# Patient Record
Sex: FEMALE | Race: WHITE | Employment: FULL TIME | URBAN - METROPOLITAN AREA
[De-identification: names, ages, dates, MRNs, and addresses within clinical notes are randomized per-mention and may not be internally consistent; named-entity substitution may affect disease eponyms.]

---

## 2017-06-10 ENCOUNTER — APPOINTMENT (OUTPATIENT)
Dept: ULTRASOUND IMAGING | Age: 37
End: 2017-06-10
Payer: COMMERCIAL

## 2017-06-10 ENCOUNTER — HOSPITAL ENCOUNTER (EMERGENCY)
Age: 37
Discharge: HOME | End: 2017-06-10
Attending: EMERGENCY MEDICINE
Payer: COMMERCIAL

## 2017-06-10 VITALS
SYSTOLIC BLOOD PRESSURE: 132 MMHG | TEMPERATURE: 98.5 F | HEIGHT: 64 IN | WEIGHT: 160 LBS | DIASTOLIC BLOOD PRESSURE: 72 MMHG | HEART RATE: 84 BPM | OXYGEN SATURATION: 99 % | BODY MASS INDEX: 27.31 KG/M2 | RESPIRATION RATE: 16 BRPM

## 2017-06-10 DIAGNOSIS — O36.4XX0 FETAL DEMISE BEFORE 22 WEEKS WITH RETENTION OF DEAD FETUS: Primary | ICD-10-CM

## 2017-06-10 LAB
-: ABNORMAL
ABO/RH: NORMAL
ABSOLUTE EOS #: 0.2 K/UL (ref 0–0.4)
ABSOLUTE LYMPH #: 3.3 K/UL (ref 1–4.8)
ABSOLUTE MONO #: 0.6 K/UL (ref 0.1–1.3)
AMORPHOUS: ABNORMAL
ANTIBODY SCREEN: NEGATIVE
ARM BAND NUMBER: NORMAL
BACTERIA: ABNORMAL
BASOPHILS # BLD: 1 %
BASOPHILS ABSOLUTE: 0.1 K/UL (ref 0–0.2)
BILIRUBIN URINE: NEGATIVE
CASTS UA: ABNORMAL /LPF
COLOR: YELLOW
COMMENT UA: ABNORMAL
CRYSTALS, UA: ABNORMAL /HPF
DIFFERENTIAL TYPE: NORMAL
DIRECT EXAM: NORMAL
EOSINOPHILS RELATIVE PERCENT: 2 %
EPITHELIAL CELLS UA: ABNORMAL /HPF
EXPIRATION DATE: NORMAL
GLUCOSE URINE: NEGATIVE
HCG QUANTITATIVE: 2417 IU/L
HCT VFR BLD CALC: 37.7 % (ref 36–46)
HEMOGLOBIN: 12.6 G/DL (ref 12–16)
KETONES, URINE: NEGATIVE
LEUKOCYTE ESTERASE, URINE: NEGATIVE
LYMPHOCYTES # BLD: 33 %
Lab: NORMAL
MCH RBC QN AUTO: 29 PG (ref 26–34)
MCHC RBC AUTO-ENTMCNC: 33.4 G/DL (ref 31–37)
MCV RBC AUTO: 86.9 FL (ref 80–100)
MONOCYTES # BLD: 6 %
MUCUS: ABNORMAL
NITRITE, URINE: NEGATIVE
OTHER OBSERVATIONS UA: ABNORMAL
PDW BLD-RTO: 13.7 % (ref 11.5–14.9)
PH UA: 7 (ref 5–8)
PLATELET # BLD: 297 K/UL (ref 150–450)
PLATELET ESTIMATE: NORMAL
PMV BLD AUTO: 8.8 FL (ref 6–12)
PROTEIN UA: ABNORMAL
RBC # BLD: 4.33 M/UL (ref 4–5.2)
RBC # BLD: NORMAL 10*6/UL
RBC UA: ABNORMAL /HPF
RENAL EPITHELIAL, UA: ABNORMAL /HPF
SEG NEUTROPHILS: 58 %
SEGMENTED NEUTROPHILS ABSOLUTE COUNT: 5.6 K/UL (ref 1.3–9.1)
SPECIFIC GRAVITY UA: 1.01 (ref 1–1.03)
SPECIMEN DESCRIPTION: NORMAL
STATUS: NORMAL
TRICHOMONAS: ABNORMAL
TURBIDITY: CLEAR
URINE HGB: ABNORMAL
UROBILINOGEN, URINE: NORMAL
WBC # BLD: 9.8 K/UL (ref 3.5–11)
WBC # BLD: NORMAL 10*3/UL
WBC UA: ABNORMAL /HPF
YEAST: ABNORMAL

## 2017-06-10 PROCEDURE — 87480 CANDIDA DNA DIR PROBE: CPT

## 2017-06-10 PROCEDURE — 87491 CHLMYD TRACH DNA AMP PROBE: CPT

## 2017-06-10 PROCEDURE — 87660 TRICHOMONAS VAGIN DIR PROBE: CPT

## 2017-06-10 PROCEDURE — 76817 TRANSVAGINAL US OBSTETRIC: CPT

## 2017-06-10 PROCEDURE — 36415 COLL VENOUS BLD VENIPUNCTURE: CPT

## 2017-06-10 PROCEDURE — 86850 RBC ANTIBODY SCREEN: CPT

## 2017-06-10 PROCEDURE — 99284 EMERGENCY DEPT VISIT MOD MDM: CPT

## 2017-06-10 PROCEDURE — 87510 GARDNER VAG DNA DIR PROBE: CPT

## 2017-06-10 PROCEDURE — 81001 URINALYSIS AUTO W/SCOPE: CPT

## 2017-06-10 PROCEDURE — 85025 COMPLETE CBC W/AUTO DIFF WBC: CPT

## 2017-06-10 PROCEDURE — 87591 N.GONORRHOEAE DNA AMP PROB: CPT

## 2017-06-10 PROCEDURE — 2580000003 HC RX 258: Performed by: PHYSICIAN ASSISTANT

## 2017-06-10 PROCEDURE — 86901 BLOOD TYPING SEROLOGIC RH(D): CPT

## 2017-06-10 PROCEDURE — 84702 CHORIONIC GONADOTROPIN TEST: CPT

## 2017-06-10 PROCEDURE — 86900 BLOOD TYPING SEROLOGIC ABO: CPT

## 2017-06-10 RX ORDER — PNV NO.95/FERROUS FUM/FOLIC AC 28MG-0.8MG
TABLET ORAL DAILY
COMMUNITY
End: 2017-06-11

## 2017-06-10 RX ORDER — 0.9 % SODIUM CHLORIDE 0.9 %
1000 INTRAVENOUS SOLUTION INTRAVENOUS ONCE
Status: COMPLETED | OUTPATIENT
Start: 2017-06-10 | End: 2017-06-10

## 2017-06-10 RX ADMIN — SODIUM CHLORIDE 1000 ML: 9 INJECTION, SOLUTION INTRAVENOUS at 17:30

## 2017-06-10 ASSESSMENT — PAIN DESCRIPTION - DESCRIPTORS: DESCRIPTORS: ACHING

## 2017-06-10 ASSESSMENT — PAIN DESCRIPTION - FREQUENCY: FREQUENCY: CONTINUOUS

## 2017-06-10 ASSESSMENT — ENCOUNTER SYMPTOMS
ABDOMINAL PAIN: 1
NAUSEA: 1
COUGH: 0
SHORTNESS OF BREATH: 0
PHOTOPHOBIA: 0
VOMITING: 0

## 2017-06-10 ASSESSMENT — PAIN DESCRIPTION - ORIENTATION: ORIENTATION: LOWER

## 2017-06-10 ASSESSMENT — PAIN DESCRIPTION - LOCATION: LOCATION: ABDOMEN

## 2017-06-10 ASSESSMENT — PAIN SCALES - GENERAL: PAINLEVEL_OUTOF10: 8

## 2017-06-11 ENCOUNTER — ANESTHESIA (OUTPATIENT)
Dept: OPERATING ROOM | Age: 37
End: 2017-06-11
Payer: COMMERCIAL

## 2017-06-11 ENCOUNTER — ANESTHESIA EVENT (OUTPATIENT)
Dept: OPERATING ROOM | Age: 37
End: 2017-06-11
Payer: COMMERCIAL

## 2017-06-11 ENCOUNTER — HOSPITAL ENCOUNTER (OUTPATIENT)
Age: 37
Setting detail: OUTPATIENT SURGERY
Discharge: HOME | End: 2017-06-11
Attending: EMERGENCY MEDICINE | Admitting: OBSTETRICS & GYNECOLOGY
Payer: COMMERCIAL

## 2017-06-11 VITALS
OXYGEN SATURATION: 96 % | HEIGHT: 64 IN | TEMPERATURE: 97.9 F | DIASTOLIC BLOOD PRESSURE: 78 MMHG | WEIGHT: 160 LBS | BODY MASS INDEX: 27.31 KG/M2 | SYSTOLIC BLOOD PRESSURE: 132 MMHG | HEART RATE: 68 BPM | RESPIRATION RATE: 20 BRPM

## 2017-06-11 VITALS — SYSTOLIC BLOOD PRESSURE: 107 MMHG | OXYGEN SATURATION: 100 % | DIASTOLIC BLOOD PRESSURE: 66 MMHG

## 2017-06-11 DIAGNOSIS — O03.9 MISCARRIAGE: Primary | ICD-10-CM

## 2017-06-11 PROBLEM — O03.4 INCOMPLETE MISCARRIAGE: Status: ACTIVE | Noted: 2017-06-11

## 2017-06-11 PROBLEM — Z98.890 HISTORY OF D&C: Status: ACTIVE | Noted: 2017-06-11

## 2017-06-11 PROBLEM — E03.9 HYPOTHYROIDISM: Status: ACTIVE | Noted: 2017-06-11

## 2017-06-11 PROBLEM — Z98.890 H/O LEEP: Status: ACTIVE | Noted: 2017-06-11

## 2017-06-11 LAB
ABO/RH: NORMAL
ABSOLUTE EOS #: 0.3 K/UL (ref 0–0.4)
ABSOLUTE LYMPH #: 3.4 K/UL (ref 1–4.8)
ABSOLUTE MONO #: 0.6 K/UL (ref 0.1–1.3)
ALBUMIN SERPL-MCNC: 3.9 G/DL (ref 3.5–5.2)
ALBUMIN/GLOBULIN RATIO: ABNORMAL (ref 1–2.5)
ALP BLD-CCNC: 64 U/L (ref 35–104)
ALT SERPL-CCNC: 9 U/L (ref 5–33)
ANION GAP SERPL CALCULATED.3IONS-SCNC: 14 MMOL/L (ref 9–17)
ANTIBODY SCREEN: NEGATIVE
ARM BAND NUMBER: NORMAL
AST SERPL-CCNC: 12 U/L
BASOPHILS # BLD: 1 %
BASOPHILS ABSOLUTE: 0.1 K/UL (ref 0–0.2)
BILIRUB SERPL-MCNC: 0.19 MG/DL (ref 0.3–1.2)
BUN BLDV-MCNC: 6 MG/DL (ref 6–20)
BUN/CREAT BLD: ABNORMAL (ref 9–20)
CALCIUM SERPL-MCNC: 9.4 MG/DL (ref 8.6–10.4)
CHLORIDE BLD-SCNC: 102 MMOL/L (ref 98–107)
CO2: 21 MMOL/L (ref 20–31)
CREAT SERPL-MCNC: 0.69 MG/DL (ref 0.5–0.9)
DIFFERENTIAL TYPE: ABNORMAL
EOSINOPHILS RELATIVE PERCENT: 2 %
EXPIRATION DATE: NORMAL
GFR AFRICAN AMERICAN: >60 ML/MIN
GFR NON-AFRICAN AMERICAN: >60 ML/MIN
GFR SERPL CREATININE-BSD FRML MDRD: ABNORMAL ML/MIN/{1.73_M2}
GFR SERPL CREATININE-BSD FRML MDRD: ABNORMAL ML/MIN/{1.73_M2}
GLUCOSE BLD-MCNC: 105 MG/DL (ref 70–99)
HCG QUANTITATIVE: 1441 IU/L
HCT VFR BLD CALC: 36.8 % (ref 36–46)
HEMOGLOBIN: 12.3 G/DL (ref 12–16)
INR BLD: 0.9
LYMPHOCYTES # BLD: 27 %
MAGNESIUM: 1.9 MG/DL (ref 1.6–2.6)
MCH RBC QN AUTO: 29.3 PG (ref 26–34)
MCHC RBC AUTO-ENTMCNC: 33.5 G/DL (ref 31–37)
MCV RBC AUTO: 87.6 FL (ref 80–100)
MONOCYTES # BLD: 5 %
PDW BLD-RTO: 13.7 % (ref 11.5–14.9)
PLATELET # BLD: 328 K/UL (ref 150–450)
PLATELET ESTIMATE: ABNORMAL
PMV BLD AUTO: 8.7 FL (ref 6–12)
POTASSIUM SERPL-SCNC: 3.4 MMOL/L (ref 3.7–5.3)
PROTHROMBIN TIME: 9.9 SEC (ref 9.7–12)
RBC # BLD: 4.2 M/UL (ref 4–5.2)
RBC # BLD: ABNORMAL 10*6/UL
SEG NEUTROPHILS: 65 %
SEGMENTED NEUTROPHILS ABSOLUTE COUNT: 8.3 K/UL (ref 1.3–9.1)
SODIUM BLD-SCNC: 137 MMOL/L (ref 135–144)
TOTAL PROTEIN: 7.6 G/DL (ref 6.4–8.3)
WBC # BLD: 12.7 K/UL (ref 3.5–11)
WBC # BLD: ABNORMAL 10*3/UL

## 2017-06-11 PROCEDURE — 2580000003 HC RX 258: Performed by: ANESTHESIOLOGY

## 2017-06-11 PROCEDURE — 2500000003 HC RX 250 WO HCPCS: Performed by: ANESTHESIOLOGY

## 2017-06-11 PROCEDURE — 86901 BLOOD TYPING SEROLOGIC RH(D): CPT

## 2017-06-11 PROCEDURE — 6360000002 HC RX W HCPCS: Performed by: EMERGENCY MEDICINE

## 2017-06-11 PROCEDURE — 6360000002 HC RX W HCPCS: Performed by: ANESTHESIOLOGY

## 2017-06-11 PROCEDURE — 3700000000 HC ANESTHESIA ATTENDED CARE: Performed by: OBSTETRICS & GYNECOLOGY

## 2017-06-11 PROCEDURE — 6360000002 HC RX W HCPCS

## 2017-06-11 PROCEDURE — 86900 BLOOD TYPING SEROLOGIC ABO: CPT

## 2017-06-11 PROCEDURE — 85025 COMPLETE CBC W/AUTO DIFF WBC: CPT

## 2017-06-11 PROCEDURE — 59812 TREATMENT OF MISCARRIAGE: CPT | Performed by: OBSTETRICS & GYNECOLOGY

## 2017-06-11 PROCEDURE — 36415 COLL VENOUS BLD VENIPUNCTURE: CPT

## 2017-06-11 PROCEDURE — 80053 COMPREHEN METABOLIC PANEL: CPT

## 2017-06-11 PROCEDURE — 86850 RBC ANTIBODY SCREEN: CPT

## 2017-06-11 PROCEDURE — 3600000012 HC SURGERY LEVEL 2 ADDTL 15MIN: Performed by: OBSTETRICS & GYNECOLOGY

## 2017-06-11 PROCEDURE — 3700000001 HC ADD 15 MINUTES (ANESTHESIA): Performed by: OBSTETRICS & GYNECOLOGY

## 2017-06-11 PROCEDURE — 85610 PROTHROMBIN TIME: CPT

## 2017-06-11 PROCEDURE — 7100000001 HC PACU RECOVERY - ADDTL 15 MIN: Performed by: OBSTETRICS & GYNECOLOGY

## 2017-06-11 PROCEDURE — 7100000000 HC PACU RECOVERY - FIRST 15 MIN: Performed by: OBSTETRICS & GYNECOLOGY

## 2017-06-11 PROCEDURE — 3600000002 HC SURGERY LEVEL 2 BASE: Performed by: OBSTETRICS & GYNECOLOGY

## 2017-06-11 PROCEDURE — 6360000002 HC RX W HCPCS: Performed by: OBSTETRICS & GYNECOLOGY

## 2017-06-11 PROCEDURE — 2580000003 HC RX 258: Performed by: EMERGENCY MEDICINE

## 2017-06-11 PROCEDURE — 99284 EMERGENCY DEPT VISIT MOD MDM: CPT

## 2017-06-11 PROCEDURE — 84702 CHORIONIC GONADOTROPIN TEST: CPT

## 2017-06-11 PROCEDURE — 88305 TISSUE EXAM BY PATHOLOGIST: CPT

## 2017-06-11 PROCEDURE — 83735 ASSAY OF MAGNESIUM: CPT

## 2017-06-11 RX ORDER — SODIUM CHLORIDE 0.9 % (FLUSH) 0.9 %
10 SYRINGE (ML) INJECTION EVERY 12 HOURS SCHEDULED
Status: CANCELLED | OUTPATIENT
Start: 2017-06-11

## 2017-06-11 RX ORDER — HYDROCODONE BITARTRATE AND ACETAMINOPHEN 5; 325 MG/1; MG/1
1 TABLET ORAL EVERY 6 HOURS PRN
Qty: 20 TABLET | Refills: 0 | Status: SHIPPED | OUTPATIENT
Start: 2017-06-11 | End: 2017-06-18

## 2017-06-11 RX ORDER — METOCLOPRAMIDE HYDROCHLORIDE 5 MG/ML
10 INJECTION INTRAMUSCULAR; INTRAVENOUS
Status: DISCONTINUED | OUTPATIENT
Start: 2017-06-11 | End: 2017-06-11 | Stop reason: HOSPADM

## 2017-06-11 RX ORDER — ONDANSETRON 2 MG/ML
4 INJECTION INTRAMUSCULAR; INTRAVENOUS
Status: DISCONTINUED | OUTPATIENT
Start: 2017-06-11 | End: 2017-06-11 | Stop reason: HOSPADM

## 2017-06-11 RX ORDER — FENTANYL CITRATE 50 UG/ML
25 INJECTION, SOLUTION INTRAMUSCULAR; INTRAVENOUS EVERY 5 MIN PRN
Status: DISCONTINUED | OUTPATIENT
Start: 2017-06-11 | End: 2017-06-11 | Stop reason: HOSPADM

## 2017-06-11 RX ORDER — HYDROCODONE BITARTRATE AND ACETAMINOPHEN 5; 325 MG/1; MG/1
2 TABLET ORAL PRN
Status: DISCONTINUED | OUTPATIENT
Start: 2017-06-11 | End: 2017-06-11 | Stop reason: HOSPADM

## 2017-06-11 RX ORDER — HYDROCODONE BITARTRATE AND ACETAMINOPHEN 5; 325 MG/1; MG/1
1 TABLET ORAL PRN
Status: DISCONTINUED | OUTPATIENT
Start: 2017-06-11 | End: 2017-06-11 | Stop reason: HOSPADM

## 2017-06-11 RX ORDER — ROCURONIUM BROMIDE 10 MG/ML
INJECTION, SOLUTION INTRAVENOUS PRN
Status: DISCONTINUED | OUTPATIENT
Start: 2017-06-11 | End: 2017-06-11 | Stop reason: SDUPTHER

## 2017-06-11 RX ORDER — KETOROLAC TROMETHAMINE 30 MG/ML
INJECTION, SOLUTION INTRAMUSCULAR; INTRAVENOUS PRN
Status: DISCONTINUED | OUTPATIENT
Start: 2017-06-11 | End: 2017-06-11 | Stop reason: SDUPTHER

## 2017-06-11 RX ORDER — SODIUM CHLORIDE 9 MG/ML
INJECTION, SOLUTION INTRAVENOUS CONTINUOUS PRN
Status: DISCONTINUED | OUTPATIENT
Start: 2017-06-11 | End: 2017-06-11 | Stop reason: SDUPTHER

## 2017-06-11 RX ORDER — SUCCINYLCHOLINE CHLORIDE 20 MG/ML
INJECTION INTRAMUSCULAR; INTRAVENOUS PRN
Status: DISCONTINUED | OUTPATIENT
Start: 2017-06-11 | End: 2017-06-11 | Stop reason: SDUPTHER

## 2017-06-11 RX ORDER — MORPHINE SULFATE 2 MG/ML
1 INJECTION, SOLUTION INTRAMUSCULAR; INTRAVENOUS EVERY 5 MIN PRN
Status: DISCONTINUED | OUTPATIENT
Start: 2017-06-11 | End: 2017-06-11 | Stop reason: HOSPADM

## 2017-06-11 RX ORDER — MEPERIDINE HYDROCHLORIDE 25 MG/ML
12.5 INJECTION INTRAMUSCULAR; INTRAVENOUS; SUBCUTANEOUS EVERY 5 MIN PRN
Status: DISCONTINUED | OUTPATIENT
Start: 2017-06-11 | End: 2017-06-11 | Stop reason: HOSPADM

## 2017-06-11 RX ORDER — HYDRALAZINE HYDROCHLORIDE 20 MG/ML
5 INJECTION INTRAMUSCULAR; INTRAVENOUS EVERY 10 MIN PRN
Status: DISCONTINUED | OUTPATIENT
Start: 2017-06-11 | End: 2017-06-11 | Stop reason: HOSPADM

## 2017-06-11 RX ORDER — SODIUM CHLORIDE 0.9 % (FLUSH) 0.9 %
10 SYRINGE (ML) INJECTION PRN
Status: CANCELLED | OUTPATIENT
Start: 2017-06-11

## 2017-06-11 RX ORDER — METHYLERGONOVINE MALEATE 0.2 MG/1
0.2 TABLET ORAL EVERY 6 HOURS
Qty: 12 TABLET | Refills: 0 | Status: SHIPPED | OUTPATIENT
Start: 2017-06-11 | End: 2017-06-14

## 2017-06-11 RX ORDER — METRONIDAZOLE 500 MG/1
500 TABLET ORAL 2 TIMES DAILY
Qty: 10 TABLET | Refills: 0 | Status: SHIPPED | OUTPATIENT
Start: 2017-06-11 | End: 2017-06-16

## 2017-06-11 RX ORDER — IBUPROFEN 800 MG/1
800 TABLET ORAL EVERY 8 HOURS PRN
Qty: 30 TABLET | Refills: 0 | Status: ON HOLD | OUTPATIENT
Start: 2017-06-11 | End: 2020-09-08

## 2017-06-11 RX ORDER — LABETALOL HYDROCHLORIDE 5 MG/ML
5 INJECTION, SOLUTION INTRAVENOUS EVERY 10 MIN PRN
Status: DISCONTINUED | OUTPATIENT
Start: 2017-06-11 | End: 2017-06-11 | Stop reason: HOSPADM

## 2017-06-11 RX ORDER — ONDANSETRON 2 MG/ML
INJECTION INTRAMUSCULAR; INTRAVENOUS PRN
Status: DISCONTINUED | OUTPATIENT
Start: 2017-06-11 | End: 2017-06-11 | Stop reason: SDUPTHER

## 2017-06-11 RX ORDER — FENTANYL CITRATE 50 UG/ML
50 INJECTION, SOLUTION INTRAMUSCULAR; INTRAVENOUS EVERY 5 MIN PRN
Status: DISCONTINUED | OUTPATIENT
Start: 2017-06-11 | End: 2017-06-11 | Stop reason: HOSPADM

## 2017-06-11 RX ORDER — PROPOFOL 10 MG/ML
INJECTION, EMULSION INTRAVENOUS PRN
Status: DISCONTINUED | OUTPATIENT
Start: 2017-06-11 | End: 2017-06-11 | Stop reason: SDUPTHER

## 2017-06-11 RX ORDER — 0.9 % SODIUM CHLORIDE 0.9 %
1000 INTRAVENOUS SOLUTION INTRAVENOUS ONCE
Status: COMPLETED | OUTPATIENT
Start: 2017-06-11 | End: 2017-06-11

## 2017-06-11 RX ORDER — FENTANYL CITRATE 50 UG/ML
INJECTION, SOLUTION INTRAMUSCULAR; INTRAVENOUS PRN
Status: DISCONTINUED | OUTPATIENT
Start: 2017-06-11 | End: 2017-06-11 | Stop reason: SDUPTHER

## 2017-06-11 RX ORDER — DIPHENHYDRAMINE HYDROCHLORIDE 50 MG/ML
12.5 INJECTION INTRAMUSCULAR; INTRAVENOUS
Status: DISCONTINUED | OUTPATIENT
Start: 2017-06-11 | End: 2017-06-11 | Stop reason: HOSPADM

## 2017-06-11 RX ADMIN — SODIUM CHLORIDE 1000 ML: 9 INJECTION, SOLUTION INTRAVENOUS at 16:00

## 2017-06-11 RX ADMIN — ONDANSETRON 4 MG: 2 INJECTION, SOLUTION INTRAMUSCULAR; INTRAVENOUS at 18:28

## 2017-06-11 RX ADMIN — PROPOFOL 200 MG: 10 INJECTION, EMULSION INTRAVENOUS at 18:13

## 2017-06-11 RX ADMIN — SODIUM CHLORIDE: 9 INJECTION, SOLUTION INTRAVENOUS at 18:11

## 2017-06-11 RX ADMIN — FENTANYL CITRATE 50 MCG: 50 INJECTION, SOLUTION INTRAMUSCULAR; INTRAVENOUS at 18:13

## 2017-06-11 RX ADMIN — KETOROLAC TROMETHAMINE 30 MG: 30 INJECTION, SOLUTION INTRAMUSCULAR; INTRAVENOUS at 18:34

## 2017-06-11 RX ADMIN — FENTANYL CITRATE 50 MCG: 50 INJECTION, SOLUTION INTRAMUSCULAR; INTRAVENOUS at 18:15

## 2017-06-11 RX ADMIN — ROCURONIUM BROMIDE 10 MG: 10 INJECTION INTRAVENOUS at 18:13

## 2017-06-11 RX ADMIN — HYDROMORPHONE HYDROCHLORIDE 1 MG: 1 INJECTION, SOLUTION INTRAMUSCULAR; INTRAVENOUS; SUBCUTANEOUS at 16:15

## 2017-06-11 RX ADMIN — SUCCINYLCHOLINE CHLORIDE 100 MG: 20 INJECTION, SOLUTION INTRAMUSCULAR; INTRAVENOUS at 18:13

## 2017-06-11 RX ADMIN — Medication 2 G: at 17:52

## 2017-06-11 ASSESSMENT — PAIN SCALES - GENERAL
PAINLEVEL_OUTOF10: 0
PAINLEVEL_OUTOF10: 10
PAINLEVEL_OUTOF10: 10
PAINLEVEL_OUTOF10: 5

## 2017-06-11 ASSESSMENT — PAIN DESCRIPTION - DESCRIPTORS
DESCRIPTORS: CRAMPING;SHARP
DESCRIPTORS: CRAMPING;SHARP;STABBING

## 2017-06-11 ASSESSMENT — PAIN DESCRIPTION - PAIN TYPE
TYPE: ACUTE PAIN
TYPE: ACUTE PAIN

## 2017-06-11 ASSESSMENT — PAIN DESCRIPTION - ORIENTATION
ORIENTATION: LOWER
ORIENTATION: LOWER

## 2017-06-11 ASSESSMENT — PAIN DESCRIPTION - LOCATION
LOCATION: ABDOMEN
LOCATION: ABDOMEN

## 2017-06-12 LAB
C TRACH DNA GENITAL QL NAA+PROBE: NEGATIVE
N. GONORRHOEAE DNA: NEGATIVE

## 2017-06-13 ENCOUNTER — TELEPHONE (OUTPATIENT)
Dept: OBGYN | Age: 37
End: 2017-06-13

## 2017-06-15 LAB — SURGICAL PATHOLOGY REPORT: NORMAL

## 2017-12-02 ENCOUNTER — HOSPITAL ENCOUNTER (OUTPATIENT)
Age: 37
Setting detail: OBSERVATION
LOS: 1 days | Discharge: HOME | End: 2017-12-03
Attending: EMERGENCY MEDICINE | Admitting: INTERNAL MEDICINE
Payer: COMMERCIAL

## 2017-12-02 ENCOUNTER — APPOINTMENT (OUTPATIENT)
Dept: CT IMAGING | Age: 37
End: 2017-12-02
Payer: COMMERCIAL

## 2017-12-02 DIAGNOSIS — E06.9 THYROIDITIS: Primary | ICD-10-CM

## 2017-12-02 DIAGNOSIS — E04.9 GOITER: ICD-10-CM

## 2017-12-02 LAB
ABSOLUTE EOS #: 0.3 K/UL (ref 0–0.4)
ABSOLUTE IMMATURE GRANULOCYTE: ABNORMAL K/UL (ref 0–0.3)
ABSOLUTE LYMPH #: 3.8 K/UL (ref 1–4.8)
ABSOLUTE MONO #: 0.8 K/UL (ref 0.1–1.3)
ANION GAP SERPL CALCULATED.3IONS-SCNC: 9 MMOL/L (ref 9–17)
BASOPHILS # BLD: 1 % (ref 0–2)
BASOPHILS ABSOLUTE: 0.1 K/UL (ref 0–0.2)
BUN BLDV-MCNC: 11 MG/DL (ref 6–20)
BUN/CREAT BLD: ABNORMAL (ref 9–20)
CALCIUM SERPL-MCNC: 9.4 MG/DL (ref 8.6–10.4)
CHLORIDE BLD-SCNC: 104 MMOL/L (ref 98–107)
CO2: 26 MMOL/L (ref 20–31)
CREAT SERPL-MCNC: 0.92 MG/DL (ref 0.5–0.9)
DIFFERENTIAL TYPE: ABNORMAL
EOSINOPHILS RELATIVE PERCENT: 3 % (ref 0–4)
GFR AFRICAN AMERICAN: >60 ML/MIN
GFR NON-AFRICAN AMERICAN: >60 ML/MIN
GFR SERPL CREATININE-BSD FRML MDRD: ABNORMAL ML/MIN/{1.73_M2}
GFR SERPL CREATININE-BSD FRML MDRD: ABNORMAL ML/MIN/{1.73_M2}
GLUCOSE BLD-MCNC: 94 MG/DL (ref 70–99)
HCT VFR BLD CALC: 36.6 % (ref 36–46)
HEMOGLOBIN: 12.2 G/DL (ref 12–16)
IMMATURE GRANULOCYTES: ABNORMAL %
LYMPHOCYTES # BLD: 34 % (ref 24–44)
MCH RBC QN AUTO: 29.4 PG (ref 26–34)
MCHC RBC AUTO-ENTMCNC: 33.3 G/DL (ref 31–37)
MCV RBC AUTO: 88.3 FL (ref 80–100)
MONOCYTES # BLD: 7 % (ref 1–7)
MONONUCLEOSIS SCREEN: NEGATIVE
PDW BLD-RTO: 15.5 % (ref 11.5–14.9)
PLATELET # BLD: 305 K/UL (ref 150–450)
PLATELET ESTIMATE: ABNORMAL
PMV BLD AUTO: 9.6 FL (ref 6–12)
POTASSIUM SERPL-SCNC: 4.4 MMOL/L (ref 3.7–5.3)
RBC # BLD: 4.14 M/UL (ref 4–5.2)
RBC # BLD: ABNORMAL 10*6/UL
SEG NEUTROPHILS: 55 % (ref 36–66)
SEGMENTED NEUTROPHILS ABSOLUTE COUNT: 6.2 K/UL (ref 1.3–9.1)
SODIUM BLD-SCNC: 139 MMOL/L (ref 135–144)
THYROXINE, FREE: 0.7 NG/DL (ref 0.93–1.7)
TSH SERPL DL<=0.05 MIU/L-ACNC: 6.55 MIU/L (ref 0.3–5)
WBC # BLD: 11.2 K/UL (ref 3.5–11)
WBC # BLD: ABNORMAL 10*3/UL

## 2017-12-02 PROCEDURE — 86376 MICROSOMAL ANTIBODY EACH: CPT

## 2017-12-02 PROCEDURE — 85025 COMPLETE CBC W/AUTO DIFF WBC: CPT

## 2017-12-02 PROCEDURE — 99285 EMERGENCY DEPT VISIT HI MDM: CPT

## 2017-12-02 PROCEDURE — 6360000004 HC RX CONTRAST MEDICATION: Performed by: EMERGENCY MEDICINE

## 2017-12-02 PROCEDURE — 2580000003 HC RX 258: Performed by: EMERGENCY MEDICINE

## 2017-12-02 PROCEDURE — G0378 HOSPITAL OBSERVATION PER HR: HCPCS

## 2017-12-02 PROCEDURE — 84443 ASSAY THYROID STIM HORMONE: CPT

## 2017-12-02 PROCEDURE — 80048 BASIC METABOLIC PNL TOTAL CA: CPT

## 2017-12-02 PROCEDURE — 86308 HETEROPHILE ANTIBODY SCREEN: CPT

## 2017-12-02 PROCEDURE — 36415 COLL VENOUS BLD VENIPUNCTURE: CPT

## 2017-12-02 PROCEDURE — 70491 CT SOFT TISSUE NECK W/DYE: CPT

## 2017-12-02 PROCEDURE — 2580000003 HC RX 258: Performed by: INTERNAL MEDICINE

## 2017-12-02 PROCEDURE — 84439 ASSAY OF FREE THYROXINE: CPT

## 2017-12-02 RX ORDER — 0.9 % SODIUM CHLORIDE 0.9 %
100 INTRAVENOUS SOLUTION INTRAVENOUS ONCE
Status: COMPLETED | OUTPATIENT
Start: 2017-12-02 | End: 2017-12-02

## 2017-12-02 RX ORDER — SODIUM CHLORIDE 0.9 % (FLUSH) 0.9 %
10 SYRINGE (ML) INJECTION EVERY 12 HOURS SCHEDULED
Status: DISCONTINUED | OUTPATIENT
Start: 2017-12-02 | End: 2017-12-03 | Stop reason: HOSPADM

## 2017-12-02 RX ORDER — SODIUM CHLORIDE 0.9 % (FLUSH) 0.9 %
10 SYRINGE (ML) INJECTION PRN
Status: DISCONTINUED | OUTPATIENT
Start: 2017-12-02 | End: 2017-12-03 | Stop reason: HOSPADM

## 2017-12-02 RX ORDER — LEVOTHYROXINE SODIUM 150 UG/1
CAPSULE ORAL DAILY
COMMUNITY
End: 2020-01-25

## 2017-12-02 RX ORDER — ACETAMINOPHEN 325 MG/1
650 TABLET ORAL EVERY 4 HOURS PRN
Status: DISCONTINUED | OUTPATIENT
Start: 2017-12-02 | End: 2017-12-03 | Stop reason: HOSPADM

## 2017-12-02 RX ADMIN — SODIUM CHLORIDE 100 ML: 9 INJECTION, SOLUTION INTRAVENOUS at 18:56

## 2017-12-02 RX ADMIN — Medication 10 ML: at 18:56

## 2017-12-02 RX ADMIN — Medication 10 ML: at 10:00

## 2017-12-02 RX ADMIN — IOPAMIDOL 70 ML: 755 INJECTION, SOLUTION INTRAVENOUS at 18:55

## 2017-12-02 ASSESSMENT — ENCOUNTER SYMPTOMS
EYE REDNESS: 0
SHORTNESS OF BREATH: 0
NAUSEA: 0
VOMITING: 0
DIARRHEA: 0
EYE DISCHARGE: 0
SORE THROAT: 0
COLOR CHANGE: 0
RHINORRHEA: 0
COUGH: 0

## 2017-12-02 ASSESSMENT — PAIN DESCRIPTION - PAIN TYPE: TYPE: ACUTE PAIN

## 2017-12-02 ASSESSMENT — PAIN DESCRIPTION - LOCATION: LOCATION: THROAT

## 2017-12-02 ASSESSMENT — PAIN SCALES - GENERAL: PAINLEVEL_OUTOF10: 8

## 2017-12-02 NOTE — ED PROVIDER NOTES
IBUPROFEN (ADVIL;MOTRIN) 800 MG TABLET    Take 1 tablet by mouth every 8 hours as needed for Pain    LEVOTHYROXINE SODIUM 150 MCG CAPS    Take by mouth Daily       ALLERGIES     is allergic to doxycycline. FAMILY HISTORY        none    SOCIAL HISTORY      reports that she has been smoking Cigarettes. She has a 11.50 pack-year smoking history. She has never used smokeless tobacco. She reports that she does not drink alcohol or use drugs. PHYSICAL EXAM     INITIAL VITALS:  height is 5' 4\" (1.626 m) and weight is 153 lb (69.4 kg). Her oral temperature is 98.2 °F (36.8 °C). Her blood pressure is 133/84 and her pulse is 72. Her respiration is 18 and oxygen saturation is 96%. Physical Exam   Constitutional: She is oriented to person, place, and time. She appears well-developed and well-nourished. Non-toxic appearance. She does not appear ill. HENT:   Head: Normocephalic and atraumatic. Mouth/Throat: Oropharynx is clear and moist.   Eyes: Conjunctivae and EOM are normal. Pupils are equal, round, and reactive to light. Neck: Trachea normal and normal range of motion. Neck supple. Thyromegaly present. Cardiovascular: Normal rate, regular rhythm, S1 normal and S2 normal.    No murmur heard. Pulmonary/Chest: Effort normal and breath sounds normal. No accessory muscle usage. No respiratory distress. She exhibits no tenderness and no deformity. Abdominal: Normal appearance and bowel sounds are normal. She exhibits no distension, no abdominal bruit and no ascites. There is no tenderness. There is no rigidity, no rebound and no guarding. Neurological: She is alert and oriented to person, place, and time. GCS eye subscore is 4. GCS verbal subscore is 5. GCS motor subscore is 6. Skin: Skin is warm. No rash noted. Psychiatric: She has a normal mood and affect. Her speech is normal.       DIFFERENTIAL DIAGNOSIS/MDM:   38-year-old female presents with swelling in the thyroid.   Plan is basic labs, CT the case with Dr THOMAS, He states that the patient requires an outpatient workup. I discussed this with the patient, she has no primary care physician and is concerned about losing out on follow-up. I understand these concerns and agree with the patient's thoughts. Plan is to admit to internal medicine for further evaluation. CRITICAL CARE:      CONSULTS:  IP CONSULT TO INTERNAL MEDICINE  IP CONSULT TO OTOLARYNGOLOGY      PROCEDURES:      FINAL IMPRESSION      1. Thyroiditis    2. Goiter          DISPOSITION/PLAN   DISPOSITION Admitted        PATIENT REFERRED TO:  No follow-up provider specified.     DISCHARGE MEDICATIONS:  New Prescriptions    No medications on file       (Please note that portions of this note were completed with a voice recognition program.  Efforts were made to edit the dictations but occasionally words are mis-transcribed.)    Safia Rodrigues  Emergency Medicine                 Safia Rodrigues MD  12/02/17 2100

## 2017-12-02 NOTE — Clinical Note
Patient Class: Inpatient [101]   REQUIRED: Diagnosis: Thyroiditis [668073]   Estimated Length of Stay: Estimated stay of more than 2 midnights   Future Attending Provider: Aldo Mauricio [[id number]]

## 2017-12-03 VITALS
DIASTOLIC BLOOD PRESSURE: 67 MMHG | SYSTOLIC BLOOD PRESSURE: 113 MMHG | OXYGEN SATURATION: 100 % | RESPIRATION RATE: 17 BRPM | WEIGHT: 153 LBS | TEMPERATURE: 98.2 F | HEIGHT: 64 IN | BODY MASS INDEX: 26.12 KG/M2 | HEART RATE: 88 BPM

## 2017-12-03 PROCEDURE — 6370000000 HC RX 637 (ALT 250 FOR IP): Performed by: INTERNAL MEDICINE

## 2017-12-03 PROCEDURE — G0378 HOSPITAL OBSERVATION PER HR: HCPCS

## 2017-12-03 PROCEDURE — 99223 1ST HOSP IP/OBS HIGH 75: CPT | Performed by: INTERNAL MEDICINE

## 2017-12-03 PROCEDURE — 96372 THER/PROPH/DIAG INJ SC/IM: CPT

## 2017-12-03 PROCEDURE — 6360000002 HC RX W HCPCS: Performed by: INTERNAL MEDICINE

## 2017-12-03 PROCEDURE — 2580000003 HC RX 258: Performed by: INTERNAL MEDICINE

## 2017-12-03 PROCEDURE — 31575 DIAGNOSTIC LARYNGOSCOPY: CPT

## 2017-12-03 RX ADMIN — Medication 10 ML: at 09:10

## 2017-12-03 RX ADMIN — ENOXAPARIN SODIUM 40 MG: 40 INJECTION SUBCUTANEOUS at 09:10

## 2017-12-03 RX ADMIN — ACETAMINOPHEN 650 MG: 325 TABLET, FILM COATED ORAL at 14:20

## 2017-12-03 RX ADMIN — ACETAMINOPHEN 650 MG: 325 TABLET, FILM COATED ORAL at 09:15

## 2017-12-03 ASSESSMENT — PAIN SCALES - GENERAL
PAINLEVEL_OUTOF10: 3
PAINLEVEL_OUTOF10: 1
PAINLEVEL_OUTOF10: 8
PAINLEVEL_OUTOF10: 7

## 2017-12-03 ASSESSMENT — PAIN DESCRIPTION - PAIN TYPE: TYPE: ACUTE PAIN

## 2017-12-03 ASSESSMENT — PAIN DESCRIPTION - LOCATION: LOCATION: THROAT

## 2017-12-03 NOTE — CARE COORDINATION
CASE MANAGEMENT NOTE:    Admission Date:  12/2/2017 Js Cash is a 42 y.o.  female    Admitted for : Thyroiditis [E06.9]  Thyroiditis [E06.9]    Met with:  Patient    PCP:  Has none, will need to check with Dr Lonnie Crawley -  If will take                              Insurance:  [facility]     Current Residence/ Living Arrangements:  independently at home- with spouse in 2 story home, can stay on 1 level             Current Services PTA:  No    Is patient agreeable to VNS: No    Freedom of choice provided: Yes         VNS chosen:  No    DME:  none    Home Oxygen: No    Nebulizer: No    Supplier: N/A    Potential Assistance Needed: get help getting PCP    SNF needed: No    Pharmacy:  [facility] on [address]       Does Patient want to use MEDS to BEDS? No    Family Members/Caregivers that pt would like involved in their care:    No    If yes, list name here:      Transportation Provider:  Patient and Family                      Discharge Plan:  Return home, needs help getting PCP, here with thyroiditis, goiter              Readmission Risk              Readmission Risk:        1.25       Age 72 or Greater:  0    Admitted from SNF or Requires Paid or Family Care:  0    Currently has CHF,COPD,ARF,CRI,or is on dialysis:  0    Takes more than 5 Prescription Medications:  0    Takes Digoxin,Insulin,Anticoagulants,Narcotics or ASA/Plavix:  [address] in Past 12 Months:  0    On Disability:  0    Patient Considers own Health:  1.25          Electronically signed by:  Mally Greene RN on 12/3/2017 at 11:34 AM

## 2017-12-03 NOTE — CONSULTS
[address]                   [address] [facility], [address]                                   CONSULTATION    PATIENT NAME: Houston Oneill                :        1980  MED REC NO:   [id number]                              ROOM:       2111  ACCOUNT NO:   [de-identified]                           ADMIT DATE: 2017  PROVIDER:     Christopher Lopez    CONSULT DATE:  2017    HISTORY OF PRESENT ILLNESS:  This is a 38-year-old who was admitted the  evening prior to this dictation with acute worsening thyroid gland  enlargement. The reviewer has referred to the chart for definitive details. The patient admits to an extended history of thyroid disease including  hypothyroidism. She has been evaluated by Endocrinology, Dr. Bob Alford. She  does require daily Synthroid. Unfortunately, she admits to gradual size  increase in her thyroid gland. She also admits to associated neck pain,  dysphagia, and fatigue. CT neck with contrast was obtained one day prior to this dictation. This  was reviewed by myself. This suggested thyroid goiter without dominant  thyroid nodule. There was noted mass affect on the trachea and esophagus. PAST MEDICAL HISTORY:  As above. PAST SURGICAL HISTORY:  LEEP, cholecystectomy, and D and C. ALLERGIES:  DOXYCYCLINE. CURRENT MEDICATIONS:  Include, but are not limited to Lovenox. Blood work, 2017 free T4 low at 0.7. Thyroid peroxidase antibody is  pending. TSH elevated at 6.55. White blood cell count 11.2, hemoglobin  within normal limits, and platelets within normal limits. PHYSICAL EXAMINATION:  VITAL SIGNS:  Afebrile. Heart rate 92, respiratory  rate 17, blood pressure 117/78, and O2 saturation 96% on room air. Weight  153 pounds. GENERAL:  Bedside exam revealed a 42year-old, in moderate  distress owing to symptoms as described above, including described fatigue and pain.   Her  voice is slightly rough. There is no respiratory distress or stridor. HEENT:  Facial exam is unremarkable. Bilateral ear exam was unremarkable. Oral exam revealed end-stage dentition. There was 2+ cryptic tonsil tissue  bilaterally. NECK:  Palpation in the neck revealed diffuse tenderness with  minimal manipulation. The thyroid gland was symmetric, but significantly  hypertrophic. Flexible laryngoscopy through the left nostril at the bedside was grossly  unremarkable. There was no evidence of vocal cord dysfunction. IMPRESSION:  Longstanding worsening thyroid gland enlargement, goiter is  suspected. Unfortunately, the patient has failed extended medical therapy. Definitive therapy will likely require total thyroidectomy. The patient is cleared for discharge from an Otolaryngology standpoint,  which suggests followup in our offices with either Dr. Michael Borrero or Dr. Ramona Bosch for second  opinion and likely surgical scheduling. We would also suggest postop  followup with her endocrinologist, Dr. Scott Velasquez, although inpatient  Endocrinology evaluation could be considered if thyroiditis is suspected. Presently, there is no indication for urgent intervention from an  Otolaryngology standpoint. Additional treatment is supportive. The patient appeared to understand these plans and considerations. All  questions were answered. The above was discussed with the Department of  Nursing as well. Please do not hesitate to contact myself with any  specific questions regarding the above.         Zulma Arizmendi    D: 12/03/2017 13:23:55       T: 12/03/2017 13:57:32     [email or web address]  Job#: [id number]     Doc#: [id number]

## 2017-12-03 NOTE — CARE COORDINATION
ADMISSION NOTE       Patient admitted to room  2111. Time of admit:  2145    Admit from:  ER    Reason for admission:  Thyroiditis    Where patient has been residing for the last 24 hrs:  Private residence    Has the patient been admitted to any facility in the last 4 weeks, which one:  No    Family at bedside:  No    Patient is currently in pain Yes    Patient has been oriented to room, educated on how to use call light, and to call for assistance prior to getting up. Bed in lowest and locked position. 2 siderails up for safety. Call light within reach. [address]  DVT Prophylaxis and Vaccine Status  Work List  Mandatory for all patients      Patient must be on both Chemical prophylaxis and Mechanical prophylaxis.  If chemical/mechanical prophylaxis is not ordered, the physician must document a reason for not using prophylaxis     Chemical Prophylaxis  Is patient on chemical prophylaxis: Yes  If no chemical prophylaxis Is a order in for No Chemical VTE prophylaxisNo  If no was the physician notified not applicable      Mechanical Prophylaxis  Is patient on mechanical prophylaxis, intermittent pneumatic compression device: Yes  If no was the physician notified not applicable        Pneumonia Vaccine  Vaccine indicated:  Not indicated  If indicated was the vaccine given: not applicable    Influenza Vaccine (applicable from October through March):  Vaccine indicated: Up-to-date  If indicated was the vaccine given: not applicable    Patient Education  Education completed on DVT prophylaxis: yes

## 2017-12-03 NOTE — PLAN OF CARE
Problem: Pain:  Goal: Control of acute pain  Control of acute pain   Outcome: Ongoing  Pt did not complain of pain on this shift.

## 2017-12-03 NOTE — H&P
[address] [facility]    HISTORY AND PHYSICAL EXAMINATION            Date:   12/3/2017  Patient name:  Candace Gaytan  Date of admission:  12/2/2017  4:25 PM  MRN:   [id number]  YOB: 1980    CHIEF COMPLAINT:   Neck mass    History Obtained From:  Patient and chart review. HISTORY OF PRESENT ILLNESS:      The patient is a 42 y.o.  female who is admitted to the hospital for  admited with neck mass and pain  And loosoing speech and hoarseness  Some trouble swallowing  No trouble breathing    Past Medical History:   has a past medical history of Thyroid disease. Past Surgical History:   has a past surgical history that includes LEEP; Cholecystectomy; Dilation and curettage of uterus; Dilation and curettage of uterus (06/11/2017); and induced abortn by dil/evac (N/A, 6/11/2017). Home Medications:    Prior to Admission medications    Medication Sig Start Date End Date Taking? Authorizing Provider   Levothyroxine Sodium 150 MCG CAPS Take by mouth Daily   Yes Historical Provider, MD   ibuprofen (ADVIL;MOTRIN) 800 MG tablet Take 1 tablet by mouth every 8 hours as needed for Pain 6/11/17 12/2/17 Yes Liane Martinez DO       Allergies:  Doxycycline    Social History:   reports that she has been smoking Cigarettes. She has a 11.50 pack-year smoking history. She has never used smokeless tobacco. She reports that she does not drink alcohol or use drugs. Family History: family history includes Diabetes in her mother; Mental Illness in her mother; Substance Abuse in her father. REVIEW OF SYSTEMS:    · Constitutional: Negative for weight loss  · Eyes: Negative for visual changes, diplopia, scleral icterus. · ENT: positve for neck mass  ·   · Cardiovascular: Negative for lightheadedness/orthostatic symptoms ,chest pain, dyspnea on exertion, palpitations or loss of consciousness.    · Respiratory: Negative for cough or wheezing, sputum production, hemoptysis, pleuritic pain. · Gastrointestinal: Negative for nausea/vomiting, change in bowel habits, abdominal pain, dysphagia/appetite loss, hematemesis, blood in stools. · Genitourinary:Negative for change in bladder habits, dysuria, trouble voiding, hematuria. · Musculoskeletal:  weakness, joint complaints. · Integumentary: Negative for rash, pruritis. · Neurological: Negative for headache, dizziness, change in muscle strength, numbness/tingling, change in gait, balance, coordination,   · Psychiatric: negative for change in mood, affect, memory, mentation, behavior. · Endocrine: negative for temperature intolerance, excessive thirst, fluid intake, or urination, tremor. · Hematologic/Lymphatic: negative for abnormal bruising or bleeding, blood clots, swollen lymph nodes. · Allergic/Immunologic: negative for nasal congestion, pruritis, hives. PHYSICAL EXAM:    /67   Pulse 88   Temp 98.2 °F (36.8 °C) (Oral)   Resp 17   Ht 5' 4\" (1.626 m)   Wt 153 lb (69.4 kg)   LMP 11/10/2017   SpO2 100%   Breastfeeding? Unknown   BMI 26.26 kg/m²      · General appearance: well nourished  · HEENT: thyromegaly  · Pain ful  ·   · Lungs: clear to auscultation bilaterally  · Heart: regular rate and rhythm, S1, S2 normal, no murmur, click, rub or gallop  · Abdomen: soft, non-tender; bowel sounds normal; no masses,  no organomegaly  · Extremities: extremities normal, atraumatic, no cyanosis or edema  · Neurological: Gait normal. Reflexes normal and symmetric.  Sensation grossly normal  · Skin - no rash, no lump   · Eye no icterus no redness  · Psych-normal affect   · NEURO-no limb weakness  No facial droop  · Lymphatic system-no lymphadenopathy no splenomegaly       DIAGNOSTICS:    Laboratory Testing:  CBC:   Recent Labs      12/02/17   1735   WBC  11.2*   HGB  12.2   PLT  305     BMP:    Recent Labs      12/02/17   1735   NA  139   K  4.4   CL  104   CO2  26   BUN  11   CREATININE

## 2017-12-04 LAB — THYROID PEROXIDASE (TPO) AB: >1000 IU/ML (ref 0–35)

## 2018-05-08 ENCOUNTER — HOSPITAL ENCOUNTER (EMERGENCY)
Age: 38
Discharge: HOME | End: 2018-05-08
Attending: EMERGENCY MEDICINE
Payer: COMMERCIAL

## 2018-05-08 VITALS
HEART RATE: 81 BPM | OXYGEN SATURATION: 99 % | SYSTOLIC BLOOD PRESSURE: 145 MMHG | TEMPERATURE: 98 F | DIASTOLIC BLOOD PRESSURE: 85 MMHG | BODY MASS INDEX: 26.26 KG/M2 | RESPIRATION RATE: 16 BRPM | WEIGHT: 153 LBS

## 2018-05-08 DIAGNOSIS — L30.9 DERMATITIS: Primary | ICD-10-CM

## 2018-05-08 PROCEDURE — 6360000002 HC RX W HCPCS: Performed by: PHYSICIAN ASSISTANT

## 2018-05-08 PROCEDURE — 96372 THER/PROPH/DIAG INJ SC/IM: CPT

## 2018-05-08 PROCEDURE — 99282 EMERGENCY DEPT VISIT SF MDM: CPT

## 2018-05-08 RX ORDER — DEXAMETHASONE SODIUM PHOSPHATE 4 MG/ML
10 INJECTION, SOLUTION INTRA-ARTICULAR; INTRALESIONAL; INTRAMUSCULAR; INTRAVENOUS; SOFT TISSUE ONCE
Status: COMPLETED | OUTPATIENT
Start: 2018-05-08 | End: 2018-05-08

## 2018-05-08 RX ORDER — PREDNISONE 20 MG/1
40 TABLET ORAL DAILY
Qty: 10 TABLET | Refills: 0 | Status: SHIPPED | OUTPATIENT
Start: 2018-05-08 | End: 2018-05-13

## 2018-05-08 RX ORDER — HYDROXYZINE 50 MG/1
50 TABLET, FILM COATED ORAL 3 TIMES DAILY PRN
Qty: 30 TABLET | Refills: 0 | Status: SHIPPED | OUTPATIENT
Start: 2018-05-08 | End: 2018-05-18

## 2018-05-08 RX ADMIN — DEXAMETHASONE SODIUM PHOSPHATE 10 MG: 4 INJECTION, SOLUTION INTRAMUSCULAR; INTRAVENOUS at 22:42

## 2018-10-30 PROBLEM — E04.9 ENLARGED THYROID: Status: ACTIVE | Noted: 2018-10-30

## 2018-10-31 ENCOUNTER — HOSPITAL ENCOUNTER (INPATIENT)
Age: 38
LOS: 1 days | Discharge: LEFT BEFORE DISCHARGE | DRG: 427 | End: 2018-10-31
Attending: FAMILY MEDICINE | Admitting: FAMILY MEDICINE
Payer: COMMERCIAL

## 2018-10-31 ENCOUNTER — APPOINTMENT (OUTPATIENT)
Dept: CT IMAGING | Age: 38
DRG: 427 | End: 2018-10-31
Attending: FAMILY MEDICINE
Payer: COMMERCIAL

## 2018-10-31 VITALS
BODY MASS INDEX: 28.83 KG/M2 | RESPIRATION RATE: 16 BRPM | SYSTOLIC BLOOD PRESSURE: 112 MMHG | DIASTOLIC BLOOD PRESSURE: 63 MMHG | OXYGEN SATURATION: 97 % | HEART RATE: 99 BPM | WEIGHT: 168.87 LBS | HEIGHT: 64 IN | TEMPERATURE: 99.3 F

## 2018-10-31 PROBLEM — R13.19 OTHER DYSPHAGIA: Status: ACTIVE | Noted: 2018-10-31

## 2018-10-31 PROBLEM — E87.6 HYPOKALEMIA: Status: ACTIVE | Noted: 2018-10-31

## 2018-10-31 PROBLEM — N30.00 ACUTE CYSTITIS: Status: ACTIVE | Noted: 2018-10-31

## 2018-10-31 LAB
ANION GAP SERPL CALCULATED.3IONS-SCNC: 14 MMOL/L (ref 9–17)
BUN BLDV-MCNC: 6 MG/DL (ref 6–20)
BUN/CREAT BLD: ABNORMAL (ref 9–20)
CALCIUM SERPL-MCNC: 7.9 MG/DL (ref 8.6–10.4)
CHLORIDE BLD-SCNC: 108 MMOL/L (ref 98–107)
CO2: 20 MMOL/L (ref 20–31)
CREAT SERPL-MCNC: 0.9 MG/DL (ref 0.5–0.9)
GFR AFRICAN AMERICAN: >60 ML/MIN
GFR NON-AFRICAN AMERICAN: >60 ML/MIN
GFR SERPL CREATININE-BSD FRML MDRD: ABNORMAL ML/MIN/{1.73_M2}
GFR SERPL CREATININE-BSD FRML MDRD: ABNORMAL ML/MIN/{1.73_M2}
GLUCOSE BLD-MCNC: 104 MG/DL (ref 70–99)
HCT VFR BLD CALC: 30.8 % (ref 36.3–47.1)
HEMOGLOBIN: 10 G/DL (ref 11.9–15.1)
INR BLD: 1
MAGNESIUM: 1.8 MG/DL (ref 1.6–2.6)
MCH RBC QN AUTO: 28.4 PG (ref 25.2–33.5)
MCHC RBC AUTO-ENTMCNC: 32.5 G/DL (ref 28.4–34.8)
MCV RBC AUTO: 87.5 FL (ref 82.6–102.9)
NRBC AUTOMATED: 0 PER 100 WBC
PDW BLD-RTO: 14.5 % (ref 11.8–14.4)
PLATELET # BLD: 270 K/UL (ref 138–453)
PMV BLD AUTO: 11.4 FL (ref 8.1–13.5)
POTASSIUM SERPL-SCNC: 3.3 MMOL/L (ref 3.7–5.3)
PROTHROMBIN TIME: 11.1 SEC (ref 9–12)
RBC # BLD: 3.52 M/UL (ref 3.95–5.11)
SODIUM BLD-SCNC: 142 MMOL/L (ref 135–144)
TSH SERPL DL<=0.05 MIU/L-ACNC: 4.94 MIU/L (ref 0.3–5)
WBC # BLD: 10.6 K/UL (ref 3.5–11.3)

## 2018-10-31 PROCEDURE — 36415 COLL VENOUS BLD VENIPUNCTURE: CPT

## 2018-10-31 PROCEDURE — G0378 HOSPITAL OBSERVATION PER HR: HCPCS

## 2018-10-31 PROCEDURE — 85610 PROTHROMBIN TIME: CPT

## 2018-10-31 PROCEDURE — 1200000000 HC SEMI PRIVATE

## 2018-10-31 PROCEDURE — 83735 ASSAY OF MAGNESIUM: CPT

## 2018-10-31 PROCEDURE — 6360000002 HC RX W HCPCS: Performed by: NURSE PRACTITIONER

## 2018-10-31 PROCEDURE — 99222 1ST HOSP IP/OBS MODERATE 55: CPT | Performed by: FAMILY MEDICINE

## 2018-10-31 PROCEDURE — 84443 ASSAY THYROID STIM HORMONE: CPT

## 2018-10-31 PROCEDURE — 2580000003 HC RX 258: Performed by: NURSE PRACTITIONER

## 2018-10-31 PROCEDURE — 96372 THER/PROPH/DIAG INJ SC/IM: CPT

## 2018-10-31 PROCEDURE — 80048 BASIC METABOLIC PNL TOTAL CA: CPT

## 2018-10-31 PROCEDURE — 93005 ELECTROCARDIOGRAM TRACING: CPT

## 2018-10-31 PROCEDURE — G0379 DIRECT REFER HOSPITAL OBSERV: HCPCS

## 2018-10-31 PROCEDURE — 85027 COMPLETE CBC AUTOMATED: CPT

## 2018-10-31 RX ORDER — SODIUM CHLORIDE 9 MG/ML
INJECTION, SOLUTION INTRAVENOUS CONTINUOUS
Status: DISCONTINUED | OUTPATIENT
Start: 2018-10-31 | End: 2018-10-31 | Stop reason: HOSPADM

## 2018-10-31 RX ORDER — BISACODYL 10 MG
10 SUPPOSITORY, RECTAL RECTAL DAILY PRN
Status: DISCONTINUED | OUTPATIENT
Start: 2018-10-31 | End: 2018-10-31 | Stop reason: HOSPADM

## 2018-10-31 RX ORDER — POTASSIUM CHLORIDE 20MEQ/15ML
40 LIQUID (ML) ORAL PRN
Status: DISCONTINUED | OUTPATIENT
Start: 2018-10-31 | End: 2018-10-31 | Stop reason: HOSPADM

## 2018-10-31 RX ORDER — CEFTRIAXONE 1 G/1
1 INJECTION, POWDER, FOR SOLUTION INTRAMUSCULAR; INTRAVENOUS EVERY 24 HOURS
Status: DISCONTINUED | OUTPATIENT
Start: 2018-10-31 | End: 2018-10-31

## 2018-10-31 RX ORDER — SODIUM CHLORIDE 0.9 % (FLUSH) 0.9 %
10 SYRINGE (ML) INJECTION EVERY 12 HOURS SCHEDULED
Status: DISCONTINUED | OUTPATIENT
Start: 2018-10-31 | End: 2018-10-31 | Stop reason: HOSPADM

## 2018-10-31 RX ORDER — ACETAMINOPHEN 325 MG/1
650 TABLET ORAL EVERY 4 HOURS PRN
Status: DISCONTINUED | OUTPATIENT
Start: 2018-10-31 | End: 2018-10-31 | Stop reason: HOSPADM

## 2018-10-31 RX ORDER — MAGNESIUM SULFATE 1 G/100ML
1 INJECTION INTRAVENOUS PRN
Status: DISCONTINUED | OUTPATIENT
Start: 2018-10-31 | End: 2018-10-31 | Stop reason: SDUPTHER

## 2018-10-31 RX ORDER — POTASSIUM CHLORIDE 7.45 MG/ML
10 INJECTION INTRAVENOUS PRN
Status: DISCONTINUED | OUTPATIENT
Start: 2018-10-31 | End: 2018-10-31 | Stop reason: HOSPADM

## 2018-10-31 RX ORDER — MAGNESIUM SULFATE 1 G/100ML
1 INJECTION INTRAVENOUS PRN
Status: DISCONTINUED | OUTPATIENT
Start: 2018-10-31 | End: 2018-10-31 | Stop reason: HOSPADM

## 2018-10-31 RX ORDER — POTASSIUM CHLORIDE 20 MEQ/1
40 TABLET, EXTENDED RELEASE ORAL PRN
Status: DISCONTINUED | OUTPATIENT
Start: 2018-10-31 | End: 2018-10-31 | Stop reason: HOSPADM

## 2018-10-31 RX ORDER — POTASSIUM CHLORIDE 20 MEQ/1
40 TABLET, EXTENDED RELEASE ORAL ONCE
Status: DISCONTINUED | OUTPATIENT
Start: 2018-10-31 | End: 2018-10-31 | Stop reason: HOSPADM

## 2018-10-31 RX ORDER — SODIUM CHLORIDE 0.9 % (FLUSH) 0.9 %
10 SYRINGE (ML) INJECTION PRN
Status: DISCONTINUED | OUTPATIENT
Start: 2018-10-31 | End: 2018-10-31 | Stop reason: HOSPADM

## 2018-10-31 RX ORDER — ONDANSETRON 2 MG/ML
4 INJECTION INTRAMUSCULAR; INTRAVENOUS EVERY 6 HOURS PRN
Status: DISCONTINUED | OUTPATIENT
Start: 2018-10-31 | End: 2018-10-31 | Stop reason: HOSPADM

## 2018-10-31 RX ADMIN — ENOXAPARIN SODIUM 40 MG: 40 INJECTION SUBCUTANEOUS at 11:28

## 2018-10-31 RX ADMIN — Medication 10 ML: at 11:29

## 2018-10-31 RX ADMIN — SODIUM CHLORIDE: 9 INJECTION, SOLUTION INTRAVENOUS at 05:02

## 2018-10-31 RX ADMIN — POTASSIUM CHLORIDE 10 MEQ: 7.46 INJECTION, SOLUTION INTRAVENOUS at 11:29

## 2018-10-31 ASSESSMENT — PAIN SCALES - GENERAL: PAINLEVEL_OUTOF10: 0

## 2018-10-31 NOTE — PROGRESS NOTES
Pt admitted to unit. VS taken and pt placed on telemetry. NP contacted for orders and labs values.  Orders carried out

## 2018-10-31 NOTE — CONSULTS
There is no redness, warmth, or swelling of the joints. Full range of motion noted. Motor strength is 5 out of 5 all extremities bilaterally. Tone is normal.  NEUROLOGIC:  Awake, alert, oriented to name, place and time. Cranial nerves II-XII are grossly intact. Motor is 5 out of 5 bilaterally. Sensory is intact.      DATA:    Labs and Radiology reports/films reviewed

## 2018-10-31 NOTE — CARE COORDINATION
Case Management Initial Discharge Plan  Cherrie Maki,             Met with:patient to discuss discharge plans. Information verified: address, contacts, phone number, , insurance Yes  PCP: No primary care provider on file. Made appt on  at 130PM at [address] [facility] Provider: none    Discharge Planning    Living Arrangements:  Spouse/Significant Other, Children   Support Systems:  Spouse/Significant Other, Parent, Danielle [place] has 2 stories  2 stairs to climb to get into front door  Location of bedroom/bathroom in home main level    Patient able to perform ADL's:Independent    Current Services (outpatient & in home) none  DME equipment: none  DME provider: N/A    Pharmacy: [facility] on [address]   Potential Assistance Purchasing Medications:  Yes  Does patient want to participate in local refill/ meds to beds program?  Yes    Potential Assistance Needed:  N/A    Patient agreeable to home care: No  [place] of choice provided:  n/a    Prior SNF/Rehab Placement and Facility: N/A  Agreeable to SNF/Rehab: No  [place] of choice provided: n/a   Evaluation: n/a    Expected Discharge date:  18  Patient expects to be discharged to:  home  Follow Up Appointment: Best Day/ Time:      Transportation provider:   Transportation arrangements needed for discharge: No    Readmission Risk              Risk of Unplanned Readmission:        10             Does patient have a readmission risk score greater than 14?: No  If yes, follow-up appointment must be made within 7 days of discharge.      Discharge Plan: home with family, HELP referral, med assist, meds to beds          Electronically signed by Serafin Bai RN on 10/31/18 at 12:48 PM

## 2018-10-31 NOTE — H&P
(76.6 kg)   SpO2 97%   BMI 28.99 kg/m²   Temp (24hrs), Av.3 °F (37.4 °C), Min:99.3 °F (37.4 °C), Max:99.3 °F (37.4 °C)    No results for input(s): POCGLU in the last 72 hours. Intake/Output Summary (Last 24 hours) at 10/31/18 1603  Last data filed at 10/31/18 0630   Gross per 24 hour   Intake                0 ml   Output                0 ml   Net                0 ml       General Appearance:  alert, well appearing, and in no acute distress  Mental status: oriented to person, place, and time with normal affect  Head:  normocephalic, atraumatic. Eye: no icterus, redness, pupils equal and reactive, extraocular eye movements intact, conjunctiva clear  Ear: normal external ear, no discharge, hearing intact  Nose:  no drainage noted  Mouth: mucous membranes moist  Neck: supple, there is diffuse enlargement of thyroid more on the right side extending down, no LN felt  Lungs: Bilateral equal air entry, clear to ausculation, no wheezing, rales or rhonchi, normal effort  Cardiovascular: normal rate, regular rhythm, no murmur, gallop, rub.   Abdomen: Soft, nontender, nondistended, normal bowel sounds, no hepatomegaly or splenomegaly  Neurologic: There are no new focal motor or sensory deficits, normal muscle tone and bulk, no abnormal sensation, normal speech, cranial nerves II through XII grossly intact  Skin: No gross lesions, rashes, bruising or bleeding on exposed skin area  Extremities:  peripheral pulses palpable, no pedal edema or calf pain with palpation  Psych: normal affect    Investigations:      Laboratory Testing:  Recent Results (from the past 24 hour(s))   EKG 12 Lead    Collection Time: 10/31/18  5:09 AM   Result Value Ref Range    Ventricular Rate 87 BPM    Atrial Rate 87 BPM    P-R Interval 150 ms    QRS Duration 92 ms    Q-T Interval 376 ms    QTc Calculation (Bazett) 452 ms    P Axis 60 degrees    R Axis 56 degrees    T Axis 34 degrees   Basic Metabolic Panel w/ Reflex to MG    Collection Time: extend posteriorly behind the trachea where the esophagus passes between the two thyroid lobes in a space measuring 7 mm. This is not significantly changed from the comparison examination\"r\" without evidence of a vascular ring. The vocal cords are symmetric. There are multiple nonenlarged lymph nodes in the neck bilaterally. Osseous structures reveal no significant the spurring. IMPRESSION: 1. Markedly enlarged and heterogeneous thyroid gland extending posteriorly around the esophagus which is confined to a space barely measuring 7 mm. Workstation:[id number] Finalized by Rodriguez Urban MD on 10/30/2018 8:54 PM            Assessment :      Primary Problem  Other dysphagia    Active Hospital Problems    Diagnosis Date Noted    Other dysphagia [R13.19] 10/31/2018     Priority: High    Hypokalemia [E87.6] 10/31/2018    Enlarged thyroid [E04.9] 10/30/2018       Plan:     Patient status Admit as inpatient in the  Med/Surge    ENT consult  Soft dental diet  Treat uti  Replace K and recheck levels  Resume chronic meds  DVT and GI PPx        Consultations:   IP CONSULT TO OTOLARYNGOLOGY     Patient is admitted as inpatient status because of co-morbidities listed above, severity of signs and symptoms as outlined, requirement for current medical therapies and most importantly because of direct risk to patient if care not provided in a hospital setting. Christian Ferraro MD  10/31/2018  4:03 PM    Copy sent to Dr. Maloney primary care provider on file.

## 2018-11-01 LAB
EKG ATRIAL RATE: 87 BPM
EKG P AXIS: 60 DEGREES
EKG P-R INTERVAL: 150 MS
EKG Q-T INTERVAL: 376 MS
EKG QRS DURATION: 92 MS
EKG QTC CALCULATION (BAZETT): 452 MS
EKG R AXIS: 56 DEGREES
EKG T AXIS: 34 DEGREES
EKG VENTRICULAR RATE: 87 BPM

## 2019-04-16 ENCOUNTER — OFFICE VISIT (OUTPATIENT)
Dept: OBGYN CLINIC | Age: 39
End: 2019-04-16
Payer: COMMERCIAL

## 2019-04-16 VITALS
BODY MASS INDEX: 28.85 KG/M2 | DIASTOLIC BLOOD PRESSURE: 100 MMHG | SYSTOLIC BLOOD PRESSURE: 142 MMHG | HEART RATE: 100 BPM | WEIGHT: 169 LBS | HEIGHT: 64 IN

## 2019-04-16 DIAGNOSIS — N92.6 IRREGULAR BLEEDING: Primary | ICD-10-CM

## 2019-04-16 DIAGNOSIS — F17.200 SMOKER: ICD-10-CM

## 2019-04-16 DIAGNOSIS — N81.4 UTERINE PROLAPSE: ICD-10-CM

## 2019-04-16 PROCEDURE — 99203 OFFICE O/P NEW LOW 30 MIN: CPT | Performed by: SPECIALIST

## 2019-04-16 PROCEDURE — G8419 CALC BMI OUT NRM PARAM NOF/U: HCPCS | Performed by: SPECIALIST

## 2019-04-16 PROCEDURE — 4004F PT TOBACCO SCREEN RCVD TLK: CPT | Performed by: SPECIALIST

## 2019-04-16 PROCEDURE — G8427 DOCREV CUR MEDS BY ELIG CLIN: HCPCS | Performed by: SPECIALIST

## 2019-04-16 ASSESSMENT — ENCOUNTER SYMPTOMS
ABDOMINAL DISTENTION: 0
DIARRHEA: 0
VOMITING: 0
NAUSEA: 0
ABDOMINAL PAIN: 0
COUGH: 0
APNEA: 0
CONSTIPATION: 0
EYE PAIN: 0

## 2019-04-16 NOTE — PROGRESS NOTES
Subjective:      Patient ID: Fermin Jacobs is a 34 y.o. female. Chief Complaint   Patient presents with    New Patient     New patient appointment for abnormal bleeding. She bleeds 3 out of 4 weeks of the month. BP (!) 142/100 (Site: Right Upper Arm, Position: Sitting, Cuff Size: Medium Adult)   Pulse 100   Ht 5' 4\" (1.626 m)   Wt 169 lb (76.7 kg)   LMP 04/02/2019   BMI 29.01 kg/m²   Patient's last menstrual period was 04/02/2019. [id number]    Past Medical History:   Diagnosis Date    Carpal tunnel syndrome     Thyroid disease      Current Outpatient Medications Ordered in Epic   Medication Sig Dispense Refill    Levothyroxine Sodium 150 MCG CAPS Take by mouth Daily      ibuprofen (ADVIL;MOTRIN) 800 MG tablet Take 1 tablet by mouth every 8 hours as needed for Pain 30 tablet 0     No current Epic-ordered facility-administered medications on file. Problem List Items Addressed This Visit     None      Visit Diagnoses     Irregular bleeding    -  Primary    Relevant Orders    US Pelvis Complete    US Non OB Transvaginal    Smoker        Relevant Orders    US Pelvis Complete    US Non OB Transvaginal    Uterine prolapse        Relevant Orders    US Pelvis Complete    US Non OB Transvaginal        Allergies   Allergen Reactions    Doxycycline Anaphylaxis    Cefaclor      Orders Placed This Encounter   Procedures    US Pelvis Complete     Standing Status:   Future     Standing Expiration Date:   4/16/2020    US Non OB Transvaginal     Begin with transabdominal imaging. Standing Status:   Future     Standing Expiration Date:   4/16/2020     Order Specific Question:   Reason for exam:     Answer:   abnormal bleeding        New patient is here today to reestablish. She is complaining of bleeding 3 weeks every month. She says that her periods have been abnormal her whole life. The nearly constant bleeding started right after her last delivery in June 2017.   She is not using any birth control. She cannot get of the couch for 3 to 4 days when she starts bleeding. She is a smoker. She is , but she and her  no longer share a room, and she is not sexually active. She says that her uterus came out with her last kid, \"it fell right out. \"  She has 4 children and miscarried 2 years ago. Review of Systems   Constitutional: Negative for activity change, appetite change and fever. HENT: Negative for ear discharge and ear pain. Eyes: Negative for pain and visual disturbance. Respiratory: Negative for apnea and cough. Cardiovascular: Negative for chest pain, palpitations and leg swelling. Gastrointestinal: Negative for abdominal distention, abdominal pain, constipation, diarrhea, nausea and vomiting. Endocrine: Negative. Genitourinary: Positive for menstrual problem. Negative for difficulty urinating, dysuria and pelvic pain. Musculoskeletal: Negative for neck pain and neck stiffness. Skin: Negative. Neurological: Negative for light-headedness and numbness. Hematological: Negative. Does not bruise/bleed easily. Objective:   Physical Exam   Constitutional: She is oriented to person, place, and time. Vital signs are normal. She appears well-developed and well-nourished. HENT:   Head: Normocephalic and atraumatic. Neck: Normal range of motion. Neck supple. No thyromegaly present. Cardiovascular: Normal rate and regular rhythm. Pulmonary/Chest: Effort normal and breath sounds normal. She has no wheezes. Abdominal: Soft. Bowel sounds are normal. She exhibits no distension and no mass. There is no tenderness. There is no guarding. Genitourinary: Vagina normal and uterus normal. Cervix exhibits motion tenderness. Genitourinary Comments: Grade 1 uterine prolapse   Musculoskeletal: Normal range of motion. She exhibits edema. Neurological: She is alert and oriented to person, place, and time. Skin: Skin is dry.    Psychiatric: She has a normal mood and affect. Her behavior is normal. Thought content normal.   Nursing note and vitals reviewed. Assessment:      Patient with irregular bleeding and Grade 1 uterine prolapse. Will evaluate with ultrasound. Patient was advised to stop smoking. Explained to patient that due to her age and smoking status, hormone therapy (birht control) is not recommended. Patient was advised to see her family physician for evaluation of elevated blood pressure. Plan:      Orders Placed This Encounter   Procedures    US Pelvis Complete    US Non OB Transvaginal     Appointment for ultrasound. Appointment for annual exam.    Celestino Cotton am scribing for, and in the presence of Dr. Tone Freeman. Electronically signed by: Velasquez Judd 4/16/19 3:10 PM       I agree to the above documentation placed by my scribe Velasquez Judd. I reviewed the scribe's note and agree with the documented findings and plan of care. Any areas of disagreement are noted on the chart. I have personally evaluated this patient. Additional findings are as noted. I agree with the chief complaint, past medical history, past surgical history, allergies, medications, social and family history as documented unless otherwise noted below.      Electronically signed by Tone Freeman MD on 4/18/2019 at 12:48 AM

## 2020-01-25 ENCOUNTER — APPOINTMENT (OUTPATIENT)
Dept: GENERAL RADIOLOGY | Age: 40
End: 2020-01-25
Payer: COMMERCIAL

## 2020-01-25 ENCOUNTER — HOSPITAL ENCOUNTER (EMERGENCY)
Age: 40
Discharge: HOME | End: 2020-01-25
Attending: EMERGENCY MEDICINE
Payer: COMMERCIAL

## 2020-01-25 VITALS
DIASTOLIC BLOOD PRESSURE: 92 MMHG | TEMPERATURE: 98.1 F | HEART RATE: 102 BPM | WEIGHT: 168 LBS | BODY MASS INDEX: 28.84 KG/M2 | OXYGEN SATURATION: 99 % | RESPIRATION RATE: 20 BRPM | SYSTOLIC BLOOD PRESSURE: 143 MMHG

## 2020-01-25 LAB
DIRECT EXAM: NORMAL
Lab: NORMAL
SPECIMEN DESCRIPTION: NORMAL

## 2020-01-25 PROCEDURE — 6370000000 HC RX 637 (ALT 250 FOR IP): Performed by: GENERAL PRACTICE

## 2020-01-25 PROCEDURE — 94664 DEMO&/EVAL PT USE INHALER: CPT

## 2020-01-25 PROCEDURE — 99284 EMERGENCY DEPT VISIT MOD MDM: CPT

## 2020-01-25 PROCEDURE — 87804 INFLUENZA ASSAY W/OPTIC: CPT

## 2020-01-25 PROCEDURE — 71046 X-RAY EXAM CHEST 2 VIEWS: CPT

## 2020-01-25 PROCEDURE — 94640 AIRWAY INHALATION TREATMENT: CPT

## 2020-01-25 RX ORDER — AZITHROMYCIN 250 MG/1
250 TABLET, FILM COATED ORAL SEE ADMIN INSTRUCTIONS
Qty: 6 TABLET | Refills: 0 | Status: SHIPPED | OUTPATIENT
Start: 2020-01-25 | End: 2020-01-30

## 2020-01-25 RX ORDER — ALBUTEROL SULFATE 90 UG/1
2 AEROSOL, METERED RESPIRATORY (INHALATION)
Status: DISCONTINUED | OUTPATIENT
Start: 2020-01-25 | End: 2020-01-25 | Stop reason: HOSPADM

## 2020-01-25 RX ORDER — OSELTAMIVIR PHOSPHATE 75 MG/1
75 CAPSULE ORAL 2 TIMES DAILY
Qty: 10 CAPSULE | Refills: 0 | Status: SHIPPED | OUTPATIENT
Start: 2020-01-25 | End: 2020-01-30

## 2020-01-25 RX ORDER — ONDANSETRON 4 MG/1
4 TABLET, ORALLY DISINTEGRATING ORAL EVERY 8 HOURS PRN
Qty: 10 TABLET | Refills: 0 | Status: SHIPPED | OUTPATIENT
Start: 2020-01-25 | End: 2020-09-05

## 2020-01-25 RX ORDER — ONDANSETRON 4 MG/1
4 TABLET, ORALLY DISINTEGRATING ORAL ONCE
Status: COMPLETED | OUTPATIENT
Start: 2020-01-25 | End: 2020-01-25

## 2020-01-25 RX ORDER — LEVOTHYROXINE SODIUM 0.15 MG/1
150 TABLET ORAL DAILY
Qty: 90 TABLET | Refills: 1 | Status: ON HOLD | OUTPATIENT
Start: 2020-01-25 | End: 2020-09-05

## 2020-01-25 RX ORDER — ACETAMINOPHEN 500 MG
1000 TABLET ORAL ONCE
Status: COMPLETED | OUTPATIENT
Start: 2020-01-25 | End: 2020-01-25

## 2020-01-25 RX ORDER — ALBUTEROL SULFATE 2.5 MG/3ML
5 SOLUTION RESPIRATORY (INHALATION)
Status: DISCONTINUED | OUTPATIENT
Start: 2020-01-25 | End: 2020-01-25 | Stop reason: HOSPADM

## 2020-01-25 RX ORDER — IPRATROPIUM BROMIDE AND ALBUTEROL SULFATE 2.5; .5 MG/3ML; MG/3ML
1 SOLUTION RESPIRATORY (INHALATION)
Status: DISCONTINUED | OUTPATIENT
Start: 2020-01-25 | End: 2020-01-25 | Stop reason: HOSPADM

## 2020-01-25 RX ORDER — ALBUTEROL SULFATE 90 UG/1
2 AEROSOL, METERED RESPIRATORY (INHALATION) 4 TIMES DAILY PRN
Qty: 1 INHALER | Refills: 5 | Status: ON HOLD | OUTPATIENT
Start: 2020-01-25 | End: 2020-09-05

## 2020-01-25 RX ADMIN — ONDANSETRON 4 MG: 4 TABLET, ORALLY DISINTEGRATING ORAL at 11:01

## 2020-01-25 RX ADMIN — IPRATROPIUM BROMIDE AND ALBUTEROL SULFATE 1 AMPULE: .5; 3 SOLUTION RESPIRATORY (INHALATION) at 11:38

## 2020-01-25 RX ADMIN — ACETAMINOPHEN 1000 MG: 500 TABLET ORAL at 11:01

## 2020-01-25 ASSESSMENT — ENCOUNTER SYMPTOMS
NAUSEA: 1
EYE PAIN: 0
COUGH: 1
SHORTNESS OF BREATH: 1
ABDOMINAL PAIN: 1

## 2020-01-25 ASSESSMENT — PAIN SCALES - GENERAL: PAINLEVEL_OUTOF10: 7

## 2020-01-25 NOTE — ED NOTES
Patient states that her son was diagnosed with pneumonia last week and she now feels like her is having trouble breathing, audible wheezes heard.        Richard Mix RN  01/25/20 [id number]

## 2020-01-25 NOTE — ED PROVIDER NOTES
[facility]  Emergency Department  Faculty Attestation     I performed a history and physical examination of the patient and discussed management with the resident. I reviewed the residents note and agree with the documented findings and plan of care. Any areas of disagreement are noted on the chart. I was personally present for the key portions of any procedures. I have documented in the chart those procedures where I was not present during the key portions. I have reviewed the emergency nurses triage note. I agree with the chief complaint, past medical history, past surgical history, allergies, medications, social and family history as documented unless otherwise noted below. For Physician Assistant/ Nurse Practitioner cases/documentation I have personally evaluated this patient and have completed at least one if not all key elements of the E/M (history, physical exam, and MDM). Additional findings are as noted. Primary Care Physician:  No primary care provider on file. Screenings:  [unfilled]    CHIEF COMPLAINT       Chief Complaint   Patient presents with    Cough    Nasal Congestion       RECENT VITALS:   Temp: 98.1 °F (36.7 °C),  Pulse: 102, Resp: 20, BP: (!) 143/92    LABS:  Labs Reviewed   RAPID INFLUENZA A/B ANTIGENS       Radiology  XR CHEST STANDARD (2 VW)   Final Result   Suggestion of a subtle right middle lobe infiltrate which may represent   pneumonia. Attending Physician Additional  Notes    Patient is been over the past 2 days with influenza-like symptoms with sudden onset of chills myalgias cough congestion headache and nausea. Her 11year-old has \"pneumonia\" on amoxicillin, 1year-old has influenza-like illness. Patient is hypothyroid, surgically, off her Synthroid for the past 3 months since she was excluded from her endocrinology follow-up and does not have a PCP.   On exam she is uncomfortable hypertensive tachycardic mildly
B Windschitl, DO       REVIEW OF SYSTEMS    (2-9 systems for level 4, 10 or more for level 5)      Review of Systems   Constitutional: Positive for chills and fever. HENT: Negative for ear pain. Eyes: Negative for pain. Respiratory: Positive for cough and shortness of breath. Cardiovascular: Positive for chest pain (only with cough). Gastrointestinal: Positive for abdominal pain and nausea. Genitourinary: Negative for dysuria. Musculoskeletal: Positive for arthralgias and myalgias. Neurological: Negative for seizures. Hematological: Negative for adenopathy. Psychiatric/Behavioral: Negative for agitation. PHYSICAL EXAM   (up to 7 for level 4, 8 or more for level 5)      INITIAL VITALS:   BP (!) 143/92   Pulse 102   Temp 98.1 °F (36.7 °C)   Resp 20   Wt 168 lb (76.2 kg)   SpO2 99%   BMI 28.84 kg/m²     Physical Exam   Gen. Appearance: patient appears well, nondistressed. HEENT: head atraumatic, normocephalic. Pupils equal and reactive to light. Edematous nasal mucosa with congestion and clear fluid drainage  Neck: Supple, normal range of motion. No lymphadenopathy. Pulmonary: Bilateral rhonchi heard mid lung field posteriorly. No rales. Mild wheezing. Equal air entry right left. Cardiovascular:  Heart sounds normal, no murmurs. Peripheral pulses strong, regular, equal.   Abdomen: Soft, nontender, nondistended. Bowel sounds normal. No palpable masses. Neurology: GCS 15. Oriented to person place and time. Normal tone and power in all 4 extremities. No sensory deficits.     Skin: Warm, dry, well perfused      DIFFERENTIAL  DIAGNOSIS     PLAN (LABS / IMAGING / EKG):  Orders Placed This Encounter   Procedures    RAPID INFLUENZA A/B ANTIGENS    XR CHEST STANDARD (2 VW)    Initiate ED Aerosol Protocol    Respiratory care evaluation only    HHN Treatment    HHN Treatment    HHN Treatment    MDI Treatment    MDI Treatment    HHN Treatment       MEDICATIONS

## 2020-03-25 PROBLEM — E04.9 ENLARGED THYROID: Status: RESOLVED | Noted: 2018-10-30 | Resolved: 2020-03-24

## 2020-09-05 ENCOUNTER — APPOINTMENT (OUTPATIENT)
Dept: GENERAL RADIOLOGY | Age: 40
DRG: 427 | End: 2020-09-05
Payer: COMMERCIAL

## 2020-09-05 ENCOUNTER — HOSPITAL ENCOUNTER (OUTPATIENT)
Age: 40
Setting detail: OBSERVATION
Discharge: HOME | DRG: 427 | End: 2020-09-08
Attending: EMERGENCY MEDICINE | Admitting: INTERNAL MEDICINE
Payer: COMMERCIAL

## 2020-09-05 PROBLEM — Z98.890 HISTORY OF D&C: Status: RESOLVED | Noted: 2017-06-11 | Resolved: 2020-09-05

## 2020-09-05 PROBLEM — E87.70 FLUID OVERLOAD: Status: ACTIVE | Noted: 2020-09-05

## 2020-09-05 PROBLEM — R13.14 PHARYNGOESOPHAGEAL DYSPHAGIA: Status: ACTIVE | Noted: 2018-10-31

## 2020-09-05 PROBLEM — J02.9 PHARYNGITIS: Status: ACTIVE | Noted: 2020-09-05

## 2020-09-05 PROBLEM — R03.0 ELEVATED BLOOD PRESSURE READING: Status: ACTIVE | Noted: 2020-09-05

## 2020-09-05 PROBLEM — R53.83 OTHER FATIGUE: Status: ACTIVE | Noted: 2020-09-05

## 2020-09-05 PROBLEM — O03.4 INCOMPLETE MISCARRIAGE: Status: RESOLVED | Noted: 2017-06-11 | Resolved: 2020-09-05

## 2020-09-05 PROBLEM — Z98.890 H/O LEEP: Status: RESOLVED | Noted: 2017-06-11 | Resolved: 2020-09-05

## 2020-09-05 PROBLEM — O03.9 SAB (SPONTANEOUS ABORTION): Status: RESOLVED | Noted: 2017-06-11 | Resolved: 2020-09-05

## 2020-09-05 PROBLEM — R29.898 RIGHT HAND WEAKNESS: Status: ACTIVE | Noted: 2020-09-05

## 2020-09-05 PROBLEM — N30.00 ACUTE CYSTITIS: Status: RESOLVED | Noted: 2018-10-31 | Resolved: 2020-09-05

## 2020-09-05 PROBLEM — E06.9 THYROIDITIS: Status: RESOLVED | Noted: 2017-12-02 | Resolved: 2020-09-05

## 2020-09-05 PROBLEM — E04.9 ENLARGED THYROID: Status: RESOLVED | Noted: 2018-10-30 | Resolved: 2020-09-05

## 2020-09-05 PROBLEM — R49.0 DYSPHONIA: Status: ACTIVE | Noted: 2020-09-05

## 2020-09-05 LAB
ABSOLUTE EOS #: 0.23 K/UL (ref 0–0.44)
ABSOLUTE IMMATURE GRANULOCYTE: 0.32 K/UL (ref 0–0.3)
ABSOLUTE LYMPH #: 3.41 K/UL (ref 1.1–3.7)
ABSOLUTE MONO #: 0.49 K/UL (ref 0.1–1.2)
ALBUMIN SERPL-MCNC: 4.5 G/DL (ref 3.5–5.2)
ALBUMIN/GLOBULIN RATIO: 1.4 (ref 1–2.5)
ALP BLD-CCNC: 65 U/L (ref 35–104)
ALT SERPL-CCNC: 21 U/L (ref 5–33)
ANION GAP SERPL CALCULATED.3IONS-SCNC: 13 MMOL/L (ref 9–17)
AST SERPL-CCNC: 36 U/L
BASOPHILS # BLD: 2 % (ref 0–2)
BASOPHILS ABSOLUTE: 0.18 K/UL (ref 0–0.2)
BILIRUB SERPL-MCNC: 0.15 MG/DL (ref 0.3–1.2)
BNP INTERPRETATION: NORMAL
BUN BLDV-MCNC: 9 MG/DL (ref 6–20)
BUN/CREAT BLD: ABNORMAL (ref 9–20)
C-REACTIVE PROTEIN: 2.9 MG/L (ref 0–5)
CALCIUM SERPL-MCNC: 8.7 MG/DL (ref 8.6–10.4)
CHLORIDE BLD-SCNC: 99 MMOL/L (ref 98–107)
CO2: 24 MMOL/L (ref 20–31)
CORTISOL COLLECTION INFO: NORMAL
CORTISOL: 8.9 UG/DL (ref 2.7–18.4)
CREAT SERPL-MCNC: 1.04 MG/DL (ref 0.5–0.9)
DIFFERENTIAL TYPE: ABNORMAL
EOSINOPHILS RELATIVE PERCENT: 2 % (ref 1–4)
FERRITIN: 35 UG/L (ref 13–150)
FOLATE: 3.5 NG/ML
GFR AFRICAN AMERICAN: >60 ML/MIN
GFR NON-AFRICAN AMERICAN: 58 ML/MIN
GFR SERPL CREATININE-BSD FRML MDRD: ABNORMAL ML/MIN/{1.73_M2}
GFR SERPL CREATININE-BSD FRML MDRD: ABNORMAL ML/MIN/{1.73_M2}
GLUCOSE BLD-MCNC: 100 MG/DL (ref 70–99)
HCT VFR BLD CALC: 39.2 % (ref 36.3–47.1)
HEMOGLOBIN: 13.1 G/DL (ref 11.9–15.1)
IMMATURE GRANULOCYTES: 3 %
IRON SATURATION: 23 % (ref 20–55)
IRON: 76 UG/DL (ref 37–145)
LV EF: 57 %
LVEF MODALITY: NORMAL
LYMPHOCYTES # BLD: 28 % (ref 24–43)
MCH RBC QN AUTO: 30.5 PG (ref 25.2–33.5)
MCHC RBC AUTO-ENTMCNC: 33.4 G/DL (ref 28.4–34.8)
MCV RBC AUTO: 91.4 FL (ref 82.6–102.9)
MONOCYTES # BLD: 4 % (ref 3–12)
NRBC AUTOMATED: 0 PER 100 WBC
PDW BLD-RTO: 17 % (ref 11.8–14.4)
PLATELET # BLD: 291 K/UL (ref 138–453)
PLATELET ESTIMATE: ABNORMAL
PMV BLD AUTO: 11.9 FL (ref 8.1–13.5)
POTASSIUM SERPL-SCNC: 3.4 MMOL/L (ref 3.7–5.3)
PRO-BNP: 39 PG/ML
PROLACTIN: 21.69 UG/L (ref 4.79–23.3)
RBC # BLD: 4.29 M/UL (ref 3.95–5.11)
RBC # BLD: ABNORMAL 10*6/UL
RHEUMATOID FACTOR: <10 IU/ML
SEDIMENTATION RATE, ERYTHROCYTE: 48 MM (ref 0–20)
SEG NEUTROPHILS: 61 % (ref 36–65)
SEGMENTED NEUTROPHILS ABSOLUTE COUNT: 7.62 K/UL (ref 1.5–8.1)
SODIUM BLD-SCNC: 136 MMOL/L (ref 135–144)
THYROXINE, FREE: 0.5 NG/DL (ref 0.93–1.7)
TOTAL CK: 1461 U/L (ref 26–192)
TOTAL IRON BINDING CAPACITY: 326 UG/DL (ref 250–450)
TOTAL PROTEIN: 7.7 G/DL (ref 6.4–8.3)
TSH SERPL DL<=0.05 MIU/L-ACNC: 230.3 MIU/L (ref 0.3–5)
UNSATURATED IRON BINDING CAPACITY: 250 UG/DL (ref 112–347)
VITAMIN B-12: 659 PG/ML (ref 232–1245)
WBC # BLD: 12.3 K/UL (ref 3.5–11.3)
WBC # BLD: ABNORMAL 10*3/UL

## 2020-09-05 PROCEDURE — 86038 ANTINUCLEAR ANTIBODIES: CPT

## 2020-09-05 PROCEDURE — 93306 TTE W/DOPPLER COMPLETE: CPT

## 2020-09-05 PROCEDURE — 36415 COLL VENOUS BLD VENIPUNCTURE: CPT

## 2020-09-05 PROCEDURE — 82607 VITAMIN B-12: CPT

## 2020-09-05 PROCEDURE — 84439 ASSAY OF FREE THYROXINE: CPT

## 2020-09-05 PROCEDURE — APPSS60 APP SPLIT SHARED TIME 46-60 MINUTES: Performed by: NURSE PRACTITIONER

## 2020-09-05 PROCEDURE — 84484 ASSAY OF TROPONIN QUANT: CPT

## 2020-09-05 PROCEDURE — 6370000000 HC RX 637 (ALT 250 FOR IP): Performed by: STUDENT IN AN ORGANIZED HEALTH CARE EDUCATION/TRAINING PROGRAM

## 2020-09-05 PROCEDURE — 86235 NUCLEAR ANTIGEN ANTIBODY: CPT

## 2020-09-05 PROCEDURE — G0378 HOSPITAL OBSERVATION PER HR: HCPCS

## 2020-09-05 PROCEDURE — 82728 ASSAY OF FERRITIN: CPT

## 2020-09-05 PROCEDURE — 93005 ELECTROCARDIOGRAM TRACING: CPT

## 2020-09-05 PROCEDURE — 83550 IRON BINDING TEST: CPT

## 2020-09-05 PROCEDURE — 84443 ASSAY THYROID STIM HORMONE: CPT

## 2020-09-05 PROCEDURE — 82533 TOTAL CORTISOL: CPT

## 2020-09-05 PROCEDURE — 83874 ASSAY OF MYOGLOBIN: CPT

## 2020-09-05 PROCEDURE — 80053 COMPREHEN METABOLIC PANEL: CPT

## 2020-09-05 PROCEDURE — 83516 IMMUNOASSAY NONANTIBODY: CPT

## 2020-09-05 PROCEDURE — 85025 COMPLETE CBC W/AUTO DIFF WBC: CPT

## 2020-09-05 PROCEDURE — 83540 ASSAY OF IRON: CPT

## 2020-09-05 PROCEDURE — 82784 ASSAY IGA/IGD/IGG/IGM EACH: CPT

## 2020-09-05 PROCEDURE — 85652 RBC SED RATE AUTOMATED: CPT

## 2020-09-05 PROCEDURE — 71045 X-RAY EXAM CHEST 1 VIEW: CPT

## 2020-09-05 PROCEDURE — 99285 EMERGENCY DEPT VISIT HI MDM: CPT

## 2020-09-05 PROCEDURE — 99223 1ST HOSP IP/OBS HIGH 75: CPT | Performed by: INTERNAL MEDICINE

## 2020-09-05 PROCEDURE — 82024 ASSAY OF ACTH: CPT

## 2020-09-05 PROCEDURE — 86225 DNA ANTIBODY NATIVE: CPT

## 2020-09-05 PROCEDURE — 86140 C-REACTIVE PROTEIN: CPT

## 2020-09-05 PROCEDURE — 86431 RHEUMATOID FACTOR QUANT: CPT

## 2020-09-05 PROCEDURE — 83880 ASSAY OF NATRIURETIC PEPTIDE: CPT

## 2020-09-05 PROCEDURE — 2580000003 HC RX 258: Performed by: INTERNAL MEDICINE

## 2020-09-05 PROCEDURE — 82550 ASSAY OF CK (CPK): CPT

## 2020-09-05 PROCEDURE — 84146 ASSAY OF PROLACTIN: CPT

## 2020-09-05 PROCEDURE — 2500000003 HC RX 250 WO HCPCS: Performed by: INTERNAL MEDICINE

## 2020-09-05 PROCEDURE — 82746 ASSAY OF FOLIC ACID SERUM: CPT

## 2020-09-05 PROCEDURE — 6370000000 HC RX 637 (ALT 250 FOR IP): Performed by: NURSE PRACTITIONER

## 2020-09-05 RX ORDER — PROMETHAZINE HYDROCHLORIDE 25 MG/1
12.5 TABLET ORAL EVERY 6 HOURS PRN
Status: DISCONTINUED | OUTPATIENT
Start: 2020-09-05 | End: 2020-09-08 | Stop reason: HOSPADM

## 2020-09-05 RX ORDER — SODIUM CHLORIDE 0.9 % (FLUSH) 0.9 %
10 SYRINGE (ML) INJECTION EVERY 12 HOURS SCHEDULED
Status: DISCONTINUED | OUTPATIENT
Start: 2020-09-05 | End: 2020-09-08 | Stop reason: HOSPADM

## 2020-09-05 RX ORDER — POTASSIUM CHLORIDE 7.45 MG/ML
10 INJECTION INTRAVENOUS PRN
Status: DISCONTINUED | OUTPATIENT
Start: 2020-09-05 | End: 2020-09-08 | Stop reason: HOSPADM

## 2020-09-05 RX ORDER — NICOTINE 21 MG/24HR
1 PATCH, TRANSDERMAL 24 HOURS TRANSDERMAL DAILY PRN
Status: DISCONTINUED | OUTPATIENT
Start: 2020-09-05 | End: 2020-09-08 | Stop reason: HOSPADM

## 2020-09-05 RX ORDER — LEVOTHYROXINE SODIUM 0.1 MG/1
200 TABLET ORAL DAILY
Status: DISCONTINUED | OUTPATIENT
Start: 2020-09-05 | End: 2020-09-05

## 2020-09-05 RX ORDER — LEVOTHYROXINE SODIUM 0.2 MG/1
200 TABLET ORAL DAILY
Status: ON HOLD | COMMUNITY
End: 2020-09-08 | Stop reason: HOSPADM

## 2020-09-05 RX ORDER — ONDANSETRON 2 MG/ML
4 INJECTION INTRAMUSCULAR; INTRAVENOUS EVERY 6 HOURS PRN
Status: DISCONTINUED | OUTPATIENT
Start: 2020-09-05 | End: 2020-09-08 | Stop reason: HOSPADM

## 2020-09-05 RX ORDER — MAGNESIUM SULFATE 1 G/100ML
1 INJECTION INTRAVENOUS PRN
Status: DISCONTINUED | OUTPATIENT
Start: 2020-09-05 | End: 2020-09-08 | Stop reason: HOSPADM

## 2020-09-05 RX ORDER — IBUPROFEN 800 MG/1
800 TABLET ORAL EVERY 8 HOURS PRN
Status: DISCONTINUED | OUTPATIENT
Start: 2020-09-05 | End: 2020-09-08 | Stop reason: HOSPADM

## 2020-09-05 RX ORDER — LEVOTHYROXINE SODIUM ANHYDROUS 100 UG/5ML
100 INJECTION, POWDER, LYOPHILIZED, FOR SOLUTION INTRAVENOUS DAILY
Status: DISCONTINUED | OUTPATIENT
Start: 2020-09-05 | End: 2020-09-08

## 2020-09-05 RX ORDER — ACETAMINOPHEN 325 MG/1
650 TABLET ORAL ONCE
Status: COMPLETED | OUTPATIENT
Start: 2020-09-05 | End: 2020-09-05

## 2020-09-05 RX ORDER — SODIUM CHLORIDE 0.9 % (FLUSH) 0.9 %
10 SYRINGE (ML) INJECTION PRN
Status: DISCONTINUED | OUTPATIENT
Start: 2020-09-05 | End: 2020-09-08 | Stop reason: HOSPADM

## 2020-09-05 RX ORDER — SODIUM CHLORIDE 9 MG/ML
5 INJECTION INTRAVENOUS DAILY
Status: DISCONTINUED | OUTPATIENT
Start: 2020-09-05 | End: 2020-09-08

## 2020-09-05 RX ORDER — IBUPROFEN 400 MG/1
600 TABLET ORAL ONCE
Status: COMPLETED | OUTPATIENT
Start: 2020-09-05 | End: 2020-09-05

## 2020-09-05 RX ORDER — ACETAMINOPHEN 325 MG/1
650 TABLET ORAL EVERY 6 HOURS PRN
Status: DISCONTINUED | OUTPATIENT
Start: 2020-09-05 | End: 2020-09-08 | Stop reason: HOSPADM

## 2020-09-05 RX ORDER — POTASSIUM CHLORIDE 20 MEQ/1
40 TABLET, EXTENDED RELEASE ORAL PRN
Status: DISCONTINUED | OUTPATIENT
Start: 2020-09-05 | End: 2020-09-08 | Stop reason: HOSPADM

## 2020-09-05 RX ORDER — ACETAMINOPHEN 650 MG/1
650 SUPPOSITORY RECTAL EVERY 6 HOURS PRN
Status: DISCONTINUED | OUTPATIENT
Start: 2020-09-05 | End: 2020-09-08 | Stop reason: HOSPADM

## 2020-09-05 RX ADMIN — IBUPROFEN 600 MG: 400 TABLET, FILM COATED ORAL at 12:32

## 2020-09-05 RX ADMIN — Medication 5 ML: at 20:54

## 2020-09-05 RX ADMIN — ACETAMINOPHEN 650 MG: 325 TABLET ORAL at 21:00

## 2020-09-05 RX ADMIN — ACETAMINOPHEN 650 MG: 325 TABLET ORAL at 12:31

## 2020-09-05 RX ADMIN — LEVOTHYROXINE SODIUM ANHYDROUS 100 MCG: 100 INJECTION, POWDER, LYOPHILIZED, FOR SOLUTION INTRAVENOUS at 20:50

## 2020-09-05 RX ADMIN — POTASSIUM CHLORIDE 40 MEQ: 1500 TABLET, EXTENDED RELEASE ORAL at 20:51

## 2020-09-05 ASSESSMENT — PAIN - FUNCTIONAL ASSESSMENT: PAIN_FUNCTIONAL_ASSESSMENT: ACTIVITIES ARE NOT PREVENTED

## 2020-09-05 ASSESSMENT — PAIN SCALES - GENERAL
PAINLEVEL_OUTOF10: 7
PAINLEVEL_OUTOF10: 6
PAINLEVEL_OUTOF10: 6

## 2020-09-05 ASSESSMENT — PAIN DESCRIPTION - ORIENTATION
ORIENTATION: LEFT;RIGHT
ORIENTATION: LOWER

## 2020-09-05 ASSESSMENT — PAIN DESCRIPTION - PAIN TYPE: TYPE: ACUTE PAIN

## 2020-09-05 ASSESSMENT — PAIN DESCRIPTION - DESCRIPTORS
DESCRIPTORS: TIGHTNESS
DESCRIPTORS: TIGHTNESS

## 2020-09-05 ASSESSMENT — ENCOUNTER SYMPTOMS
COLOR CHANGE: 0
CHEST TIGHTNESS: 0
WHEEZING: 0
NAUSEA: 0
EYE DISCHARGE: 0
STRIDOR: 0
SHORTNESS OF BREATH: 0
EYE ITCHING: 0
BLOOD IN STOOL: 0
EYE REDNESS: 0
VOMITING: 0
DIARRHEA: 1
PHOTOPHOBIA: 0
CONSTIPATION: 0
SHORTNESS OF BREATH: 1
ABDOMINAL PAIN: 0
COUGH: 0
EYE PAIN: 0
VOICE CHANGE: 1

## 2020-09-05 ASSESSMENT — PAIN DESCRIPTION - LOCATION
LOCATION: ARM
LOCATION: ARM

## 2020-09-05 ASSESSMENT — PAIN DESCRIPTION - PROGRESSION: CLINICAL_PROGRESSION: NOT CHANGED

## 2020-09-05 ASSESSMENT — PAIN DESCRIPTION - ONSET: ONSET: ON-GOING

## 2020-09-05 ASSESSMENT — PAIN DESCRIPTION - FREQUENCY: FREQUENCY: CONTINUOUS

## 2020-09-05 NOTE — H&P
[facility]  Office: [address], DO, Fab Choi, DO, Laura Cruz, DO, Yohannes Torres, DO, Fredy Waterman MD, Marilin Torres MD, Brandy Celaya MD, Nancy Gold MD, Bryant Sanchez MD, Daniella Barrett MD, Brandon Pal MD, Zulma William MD, Robyn Lauren MD, Tawanda Jamil DO, Ramona Garcia MD, Darshana Saravia MD, German May DO, Latanya Amanda MD,  Luis Mcgarry DO, Soni Hernandez MD, Sergio Morgan MD, Kevin Real, [facility], [facility], CNP, Frederick Servin, CNP, Thierry Davis, CNS, Nicolerenato Pineda, CNP, Claude Leep, CNP, Emperatriz Negrete, CNP, Monie Menchaca, CNP, Gay Canales, CNP, Jose Meneses PA-C, Italia Wiley, ALEX, Lori Lynn, CNP, Alexander, CNP, Dagmar Esparza, CNP, Randi Buchanan, CNP, Brian Parikh, CNP         [address]    HISTORY AND PHYSICAL EXAMINATION            Date:   9/5/2020  Patient name:  Nilton Boggs  Date of admission:  9/5/2020  9:58 AM  MRN:   [id number]  Account:  [de-identified]  YOB: 1980  PCP:    No primary care provider on file. Room:   0341/0341-01  Code Status:    Prior    Chief Complaint:     Chief Complaint   Patient presents with    Facial Swelling     noticed swelling in face four days ago    Leg Swelling     bilateral arm and leg swelling for three weeks. History Obtained From:     patient, electronic medical record    History of Present Illness:     Nilton Boggs is a 37 y.o. Non-/non  female who presents with Facial Swelling (noticed swelling in face four days ago) and Leg Swelling (bilateral arm and leg swelling for three weeks. )   and is admitted to the hospital for the management of fluid overload, hypothyroidism.       Patient presents to the emergency room with a chief complaint of bilateral upper extremity and lower extremity swelling which has been ongoing and progressively worsening for the past month, with associated fatigue, weight gain, heat intolerance, exertional shortness of breath without orthopnea or cough. Patient denies chest pain dizziness lightheadedness palpitations. On chart review patient was recently seen and found to have a TSH nearly 300 with a low T4, levothyroxine dose was increased. Patient with complaints of right arm and hand weakness ongoing for the past month states she was seen and treated in the emergency room, diagnosed with carpal tunnel. No imaging done, positive TINEL on exam.     Work up in the emergency room      Vitals:    Temp. 97.8    HR. 92     RR. 14      BP.  153/106       SPO2. 98% room air    Laboratories:   Metabolic panel. -  GI/Liver Panel-  Hematology. -  Coagulation-  Cardiac Markers-   EKG-   Urine-       Imaging and Diagnostics:     None      Past Medical History:     Past Medical History:   Diagnosis Date    Carpal tunnel syndrome     Thyroid disease         Past Surgical History:     Past Surgical History:   Procedure Laterality Date    CHOLECYSTECTOMY      DILATION AND CURETTAGE OF UTERUS      DILATION AND CURETTAGE OF UTERUS  06/11/2017    LEEP      PA INDUCED ABORTN BY DIL/EVAC N/A 6/11/2017    DILATATION AND CURETTAGE SUCTION performed by Marisela Sewell DO at [address]        Medications Prior to Admission:     Prior to Admission medications    Medication Sig Start Date End Date Taking? Authorizing Provider   levothyroxine (SYNTHROID) 150 MCG tablet Take 1 tablet by mouth daily 1/25/20  Yes Steve Alston DO   ibuprofen (ADVIL;MOTRIN) 800 MG tablet Take 1 tablet by mouth every 8 hours as needed for Pain 6/11/17 9/5/20 Yes Debi Chilel DO   albuterol sulfate  (90 Base) MCG/ACT inhaler Inhale 2 puffs into the lungs 4 times daily as needed for Wheezing 1/25/20   Steve Alston DO        Allergies:     Doxycycline and Cefaclor    Social History:     Tobacco:    reports that she has been smoking cigarettes.  She has a 11.50 pack-year 1438    Physical Exam  Vitals signs and nursing note reviewed. Constitutional:       General: She is not in acute distress. Appearance: She is well-developed. She is not ill-appearing, toxic-appearing or diaphoretic. Comments: Appears fatigued   HENT:      Head: Normocephalic and atraumatic. Right Ear: Hearing normal.      Left Ear: Hearing normal.      Nose: Nose normal. No rhinorrhea. Eyes:      General: Lids are normal.      Extraocular Movements:      Right eye: Normal extraocular motion. Left eye: Normal extraocular motion. Conjunctiva/sclera: Conjunctivae normal.      Right eye: Right conjunctiva is not injected. Left eye: Left conjunctiva is not injected. Pupils: Pupils are equal, round, and reactive to light. Pupils are equal.      Right eye: Pupil is reactive. Left eye: Pupil is reactive. Neck:      Musculoskeletal: Neck supple. Thyroid: No thyromegaly. Vascular: No carotid bruit. Trachea: Trachea and phonation normal. No tracheal deviation. Cardiovascular:      Rate and Rhythm: Normal rate. Rhythm irregular. Pulses: Normal pulses. Heart sounds: Normal heart sounds. No murmur. Comments: Generalized tight nonpitting edema to arms and legs  Pulmonary:      Effort: Pulmonary effort is normal. No respiratory distress. Breath sounds: Normal breath sounds. No stridor. No decreased breath sounds. Abdominal:      General: Bowel sounds are normal. There is no distension. Palpations: Abdomen is soft. There is no mass. Tenderness: There is no abdominal tenderness. There is no guarding. Musculoskeletal:         General: No tenderness. Skin:     General: Skin is warm and dry. Findings: No erythema, lesion or rash. Comments: No rashes to exposed areas   Neurological:      Mental Status: She is oriented to person, place, and time. She is lethargic. She is not disoriented.       Cranial Nerves: No cranial nerve deficit. Comments: Right hand weakness on , states secondary to the weakness she has been experiencing x1 month to the right hand and arm  Upper extremity without weakness   Psychiatric:         Speech: Speech normal.         Behavior: Behavior normal. Behavior is cooperative.          Investigations:      Laboratory Testing:  Recent Results (from the past 24 hour(s))   CORTISOL    Collection Time: 09/05/20 10:36 AM   Result Value Ref Range    Cortisol 8.9 2.7 - 18.4 ug/dL    Cortisol Collection Info NOT REPORTED    T4, FREE    Collection Time: 09/05/20 10:36 AM   Result Value Ref Range    Thyroxine, Free 0.50 (L) 0.93 - 1.70 ng/dL   TSH without Reflex    Collection Time: 09/05/20 10:36 AM   Result Value Ref Range    .30 (H) 0.30 - 5.00 mIU/L   Comprehensive Metabolic Panel    Collection Time: 09/05/20 10:36 AM   Result Value Ref Range    Glucose 100 (H) 70 - 99 mg/dL    BUN 9 6 - 20 mg/dL    CREATININE 1.04 (H) 0.50 - 0.90 mg/dL    Bun/Cre Ratio NOT REPORTED 9 - 20    Calcium 8.7 8.6 - 10.4 mg/dL    Sodium 136 135 - 144 mmol/L    Potassium 3.4 (L) 3.7 - 5.3 mmol/L    Chloride 99 98 - 107 mmol/L    CO2 24 20 - 31 mmol/L    Anion Gap 13 9 - 17 mmol/L    Alkaline Phosphatase 65 35 - 104 U/L    ALT 21 5 - 33 U/L    AST 36 (H) <32 U/L    Total Bilirubin 0.15 (L) 0.3 - 1.2 mg/dL    Total Protein 7.7 6.4 - 8.3 g/dL    Alb 4.5 3.5 - 5.2 g/dL    Albumin/Globulin Ratio 1.4 1.0 - 2.5    GFR Non- 58 (L) >60 mL/min    GFR African American >60 >60 mL/min    GFR Comment          GFR Staging NOT REPORTED    CBC Auto Differential    Collection Time: 09/05/20 10:36 AM   Result Value Ref Range    WBC 12.3 (H) 3.5 - 11.3 k/uL    RBC 4.29 3.95 - 5.11 m/uL    Hemoglobin 13.1 11.9 - 15.1 g/dL    Hematocrit 39.2 36.3 - 47.1 %    MCV 91.4 82.6 - 102.9 fL    MCH 30.5 25.2 - 33.5 pg    MCHC 33.4 28.4 - 34.8 g/dL    RDW 17.0 (H) 11.8 - 14.4 %    Platelets 587 745 - 142 k/uL    MPV 11.9 8.1 - 13.5 fL    NRBC Automated 0.0 0.0 per 100 WBC    Differential Type NOT REPORTED     Seg Neutrophils 61 36 - 65 %    Lymphocytes 28 24 - 43 %    Monocytes 4 3 - 12 %    Eosinophils % 2 1 - 4 %    Basophils 2 0 - 2 %    Immature Granulocytes 3 (H) 0 %    Segs Absolute 7.62 1.50 - 8.10 k/uL    Absolute Lymph # 3.41 1.10 - 3.70 k/uL    Absolute Mono # 0.49 0.10 - 1.20 k/uL    Absolute Eos # 0.23 0.00 - 0.44 k/uL    Basophils Absolute 0.18 0.00 - 0.20 k/uL    Absolute Immature Granulocyte 0.32 (H) 0.00 - 0.30 k/uL    WBC Morphology NOT REPORTED     RBC Morphology ANISOCYTOSIS PRESENT     Platelet Estimate NOT REPORTED    PROLACTIN    Collection Time: 09/05/20 10:36 AM   Result Value Ref Range    Prolactin 21.69 4.79 - 23.30 ug/L   EKG 12 Lead    Collection Time: 09/05/20  1:00 PM   Result Value Ref Range    Ventricular Rate 67 BPM    Atrial Rate 67 BPM    P-R Interval 184 ms    QRS Duration 88 ms    Q-T Interval 424 ms    QTc Calculation (Bazett) 448 ms    P Axis 58 degrees    R Axis 47 degrees    T Axis 54 degrees         Previous thyroid functions    8/26/2020-.86, free T3 2.20, free T4 less than 0.25  10/30/2018 TSH 5.70, free T4 0.57,   7/23/2017    TSH 7.74, free T4, 0.53          Imaging/Diagnostics:  No results found. Assessment :      Hospital Problems           Last Modified POA    Hypothyroidism 9/5/2020 Yes    Pharyngoesophageal dysphagia 9/5/2020 Yes    Hypokalemia 9/5/2020 Yes    Fluid overload 9/5/2020 Yes    Other fatigue 9/5/2020 Yes    Elevated blood pressure reading 9/5/2020 Yes    Dysphonia 9/5/2020 Yes    Pharyngitis 9/5/2020 Yes          Plan:     Patient status observation in the Med/Surge    1. Fluid overload-assess for cardiovascular reasoning. Check echocardiogram, but could be related to thyroid, check chest x-ray which will tell us if there is any underlying lung disease and or cardiomegaly.    2. Hypothyroidism-Free thyroxine has actually decreased since increased dose of Synthroid from 150 mcg to

## 2020-09-05 NOTE — ED PROVIDER NOTES
[facility]  ED  Emergency Department Encounter  EmergencyMedicine Resident     Pt Name:Cherrie Faust  MRN: [id number]  [place] 1980  Date of evaluation: 9/5/20  PCP:  No primary care provider on file. CHIEF COMPLAINT       Chief Complaint   Patient presents with    Facial Swelling     noticed swelling in face four days ago    Leg Swelling     bilateral arm and leg swelling for three weeks. HISTORY OF PRESENT ILLNESS  (Location/Symptom, Timing/Onset, Context/Setting, Quality, Duration, Modifying Factors, Severity.)      Celeste Hatfield is a 37 y.o. female who presents with chief complaint of hand swelling bilaterally starting 3 weeks ago that progressed into arm upper arm leg and face. Patient says over this time. She is also become very sleepy with no energy. Denies chest pain, dizziness, shortness of breath, headache, belly pain, vomiting, diarrhea. Has difficult medical history for thyroidectomy about 1 year ago for diffuse nodular goiter. Recent visit to see physician for levothyroxine adjustment was taking 150 mcg now 200. Says she never misses a dose. Denies history of corticosteroid use. Denies smoking, alcohol, street drugs    PAST MEDICAL / SURGICAL / SOCIAL / FAMILY HISTORY      has a past medical history of Carpal tunnel syndrome and Thyroid disease. has a past surgical history that includes LEEP; Cholecystectomy; Dilation and curettage of uterus; Dilation and curettage of uterus (06/11/2017); and pr induced abortn by dil/evac (N/A, 6/11/2017).     Social History     Socioeconomic History    Marital status:      Spouse name: Not on file    Number of children: Not on file    Years of education: Not on file    Highest education level: Not on file   Occupational History    Not on file   Social Needs    Financial resource strain: Not on file    Food insecurity     Worry: Not on file     Inability: Not on file   [employer] needs redness, itching and visual disturbance. Respiratory: Negative for chest tightness and shortness of breath. Cardiovascular: Negative for chest pain. Gastrointestinal: Negative for abdominal pain and vomiting. Endocrine: Negative for polyuria. Genitourinary: Negative for dysuria. Musculoskeletal: Negative for arthralgias. Skin: Negative for color change. Allergic/Immunologic: Negative for immunocompromised state. Neurological: Negative for dizziness and weakness. Hematological: Does not bruise/bleed easily. Psychiatric/Behavioral: Negative for agitation. PHYSICAL EXAM   (up to 7 for level 4, 8 or more for level 5)      INITIAL VITALS:   BP (!) 147/94   Pulse 92   Temp 97.8 °F (36.6 °C)   Resp 14   Wt 190 lb (86.2 kg)   SpO2 99%   BMI 32.61 kg/m²     Physical Exam  Constitutional:       General: She is not in acute distress. Appearance: Normal appearance. She is obese. She is not toxic-appearing. HENT:      Head: Normocephalic and atraumatic. Nose: Nose normal.      Mouth/Throat:      Mouth: Mucous membranes are moist.      Pharynx: Oropharynx is clear. Eyes:      Extraocular Movements: Extraocular movements intact. Conjunctiva/sclera: Conjunctivae normal.      Pupils: Pupils are equal, round, and reactive to light. Neck:      Musculoskeletal: Normal range of motion and neck supple. No neck rigidity. Cardiovascular:      Rate and Rhythm: Normal rate and regular rhythm. Pulses: Normal pulses. Heart sounds: Normal heart sounds. No murmur. Pulmonary:      Effort: Pulmonary effort is normal.      Breath sounds: Normal breath sounds. No wheezing. Abdominal:      General: Abdomen is flat. Bowel sounds are normal.      Palpations: Abdomen is soft. Tenderness: There is no abdominal tenderness. Musculoskeletal: Normal range of motion. General: Swelling present. Right lower leg: Edema present. Left lower leg: Edema present.    Skin: General: Skin is warm and dry. Capillary Refill: Capillary refill takes less than 2 seconds. Coloration: Skin is not jaundiced. Neurological:      General: No focal deficit present. Mental Status: She is alert and oriented to person, place, and time. Mental status is at baseline. Motor: No weakness.    Psychiatric:         Mood and Affect: Mood normal.         DIFFERENTIAL  DIAGNOSIS     PLAN (LABS / IMAGING / EKG):  Orders Placed This Encounter   Procedures    XR CHEST PORTABLE    ACTH    CORTISOL    T4, FREE    TSH without Reflex    Comprehensive Metabolic Panel    CBC Auto Differential    URINALYSIS    PROLACTIN    Basic Metabolic Panel w/ Reflex to MG    CBC    Protime-INR    TROP/MYOGLOBIN    Inpatient consult to Hospitalist    ECHO Complete 2D W Doppler W Color    PATIENT STATUS (FROM ED OR OR/PROCEDURAL) Observation       MEDICATIONS ORDERED:  Orders Placed This Encounter   Medications    acetaminophen (TYLENOL) tablet 650 mg    ibuprofen (ADVIL;MOTRIN) tablet 600 mg    ibuprofen (ADVIL;MOTRIN) tablet 800 mg    levothyroxine (SYNTHROID) tablet 200 mcg    OR Linked Order Group     potassium chloride (KLOR-CON M) extended release tablet 40 mEq     potassium bicarb-citric acid (EFFER-K) effervescent tablet 40 mEq     potassium chloride 10 mEq/100 mL IVPB (Peripheral Line)    magnesium sulfate 1 g in dextrose 5% 100 mL IVPB    OR Linked Order Group     acetaminophen (TYLENOL) tablet 650 mg     acetaminophen (TYLENOL) suppository 650 mg    OR Linked Order Group     promethazine (PHENERGAN) tablet 12.5 mg     ondansetron (ZOFRAN) injection 4 mg    enoxaparin (LOVENOX) injection 40 mg           DIAGNOSTIC RESULTS / EMERGENCY DEPARTMENT COURSE / MDM     LABS:  Results for orders placed or performed during the hospital encounter of 09/05/20   CORTISOL   Result Value Ref Range    Cortisol 8.9 2.7 - 18.4 ug/dL    Cortisol Collection Info NOT REPORTED    T4, FREE Result Value Ref Range    Thyroxine, Free 0.50 (L) 0.93 - 1.70 ng/dL   TSH without Reflex   Result Value Ref Range    .30 (H) 0.30 - 5.00 mIU/L   Comprehensive Metabolic Panel   Result Value Ref Range    Glucose 100 (H) 70 - 99 mg/dL    BUN 9 6 - 20 mg/dL    CREATININE 1.04 (H) 0.50 - 0.90 mg/dL    Bun/Cre Ratio NOT REPORTED 9 - 20    Calcium 8.7 8.6 - 10.4 mg/dL    Sodium 136 135 - 144 mmol/L    Potassium 3.4 (L) 3.7 - 5.3 mmol/L    Chloride 99 98 - 107 mmol/L    CO2 24 20 - 31 mmol/L    Anion Gap 13 9 - 17 mmol/L    Alkaline Phosphatase 65 35 - 104 U/L    ALT 21 5 - 33 U/L    AST 36 (H) <32 U/L    Total Bilirubin 0.15 (L) 0.3 - 1.2 mg/dL    Total Protein 7.7 6.4 - 8.3 g/dL    Alb 4.5 3.5 - 5.2 g/dL    Albumin/Globulin Ratio 1.4 1.0 - 2.5    GFR Non- 58 (L) >60 mL/min    GFR African American >60 >60 mL/min    GFR Comment          GFR Staging NOT REPORTED    CBC Auto Differential   Result Value Ref Range    WBC 12.3 (H) 3.5 - 11.3 k/uL    RBC 4.29 3.95 - 5.11 m/uL    Hemoglobin 13.1 11.9 - 15.1 g/dL    Hematocrit 39.2 36.3 - 47.1 %    MCV 91.4 82.6 - 102.9 fL    MCH 30.5 25.2 - 33.5 pg    MCHC 33.4 28.4 - 34.8 g/dL    RDW 17.0 (H) 11.8 - 14.4 %    Platelets 692 740 - 455 k/uL    MPV 11.9 8.1 - 13.5 fL    NRBC Automated 0.0 0.0 per 100 WBC    Differential Type NOT REPORTED     Seg Neutrophils 61 36 - 65 %    Lymphocytes 28 24 - 43 %    Monocytes 4 3 - 12 %    Eosinophils % 2 1 - 4 %    Basophils 2 0 - 2 %    Immature Granulocytes 3 (H) 0 %    Segs Absolute 7.62 1.50 - 8.10 k/uL    Absolute Lymph # 3.41 1.10 - 3.70 k/uL    Absolute Mono # 0.49 0.10 - 1.20 k/uL    Absolute Eos # 0.23 0.00 - 0.44 k/uL    Basophils Absolute 0.18 0.00 - 0.20 k/uL    Absolute Immature Granulocyte 0.32 (H) 0.00 - 0.30 k/uL    WBC Morphology NOT REPORTED     RBC Morphology ANISOCYTOSIS PRESENT     Platelet Estimate NOT REPORTED    PROLACTIN   Result Value Ref Range    Prolactin 21.69 4.79 - 23.30 ug/L RADIOLOGY:  None    EKG  None    All EKG's are interpreted by the Emergency Department Physician who either signs or Co-signs this chart in the absence of a cardiologist.    EMERGENCY DEPARTMENT COURSE:  Patient is calm and comfortable in ED bed. She is ambulatory with a normal gait. Breathing comfortably on room air without pausing to take a breath. Compliant with the interview and exam.  Physical exam remarkable for diffuse swelling and edema in arms legs and face. On chart review patient was recently seen and found to have a TSH nearly 300 with a low T4, levothyroxine dose was increased. Patient's symptoms have been progressively worse. No visual field deficits or focal neuro deficits. Patient likely has an endocrinopathy, will test for ACTH, cortisol, TSH, free T4 and routine blood work. TSH profoundly elevated with a low T4. Will admit to [facility] for endocrinopathy        PROCEDURES:  None    CONSULTS:  IP CONSULT TO HOSPITALIST    CRITICAL CARE:  None    FINAL IMPRESSION      1. Other specified hypothyroidism          DISPOSITION / PLAN     DISPOSITION Admitted 09/05/2020 12:38:34 PM      PATIENT REFERRED TO:  No follow-up provider specified.     DISCHARGE MEDICATIONS:  New Prescriptions    No medications on file       Yasmine Donovan MD  Emergency Medicine Resident    (Please note that portions of thisnote were completed with a voice recognition program.  Efforts were made to edit the dictations but occasionally words are mis-transcribed.)       Yasmine Donovan MD  Resident  09/05/20 0758

## 2020-09-06 ENCOUNTER — APPOINTMENT (OUTPATIENT)
Dept: CT IMAGING | Age: 40
DRG: 427 | End: 2020-09-06
Payer: COMMERCIAL

## 2020-09-06 PROBLEM — E83.39 HYPOPHOSPHATEMIA: Status: ACTIVE | Noted: 2020-09-06

## 2020-09-06 PROBLEM — E66.9 OBESITY (BMI 30-39.9): Status: ACTIVE | Noted: 2020-09-06

## 2020-09-06 LAB
-: ABNORMAL
ABSOLUTE EOS #: 0.22 K/UL (ref 0–0.44)
ABSOLUTE IMMATURE GRANULOCYTE: 0.03 K/UL (ref 0–0.3)
ABSOLUTE LYMPH #: 2.99 K/UL (ref 1.1–3.7)
ABSOLUTE MONO #: 0.36 K/UL (ref 0.1–1.2)
ALBUMIN SERPL-MCNC: 4.2 G/DL (ref 3.5–5.2)
AMORPHOUS: ABNORMAL
ANION GAP SERPL CALCULATED.3IONS-SCNC: 14 MMOL/L (ref 9–17)
BACTERIA: ABNORMAL
BASOPHILS # BLD: 2 % (ref 0–2)
BASOPHILS ABSOLUTE: 0.13 K/UL (ref 0–0.2)
BILIRUBIN URINE: NEGATIVE
BUN BLDV-MCNC: 10 MG/DL (ref 6–20)
BUN/CREAT BLD: ABNORMAL (ref 9–20)
CALCIUM SERPL-MCNC: 8.4 MG/DL (ref 8.6–10.4)
CASTS UA: ABNORMAL /LPF (ref 0–2)
CHLORIDE BLD-SCNC: 103 MMOL/L (ref 98–107)
CO2: 22 MMOL/L (ref 20–31)
COLOR: YELLOW
COMMENT UA: ABNORMAL
CREAT SERPL-MCNC: 1 MG/DL (ref 0.5–0.9)
CRYSTALS, UA: ABNORMAL /HPF
DIFFERENTIAL TYPE: ABNORMAL
EOSINOPHILS RELATIVE PERCENT: 3 % (ref 1–4)
EPITHELIAL CELLS UA: ABNORMAL /HPF (ref 0–5)
GFR AFRICAN AMERICAN: >60 ML/MIN
GFR NON-AFRICAN AMERICAN: >60 ML/MIN
GFR SERPL CREATININE-BSD FRML MDRD: ABNORMAL ML/MIN/{1.73_M2}
GFR SERPL CREATININE-BSD FRML MDRD: ABNORMAL ML/MIN/{1.73_M2}
GLUCOSE BLD-MCNC: 93 MG/DL (ref 70–99)
GLUCOSE URINE: NEGATIVE
HCT VFR BLD CALC: 38.5 % (ref 36.3–47.1)
HEMOGLOBIN: 12.4 G/DL (ref 11.9–15.1)
IMMATURE GRANULOCYTES: 0 %
INR BLD: 1
KETONES, URINE: NEGATIVE
LEUKOCYTE ESTERASE, URINE: POSITIVE
LYMPHOCYTES # BLD: 35 % (ref 24–43)
MAGNESIUM: 2.3 MG/DL (ref 1.6–2.6)
MCH RBC QN AUTO: 29.8 PG (ref 25.2–33.5)
MCHC RBC AUTO-ENTMCNC: 32.2 G/DL (ref 28.4–34.8)
MCV RBC AUTO: 92.5 FL (ref 82.6–102.9)
MONOCYTES # BLD: 4 % (ref 3–12)
MUCUS: ABNORMAL
MYOGLOBIN: 120 NG/ML (ref 25–58)
MYOGLOBIN: 77 NG/ML (ref 25–58)
NITRITE, URINE: NEGATIVE
NRBC AUTOMATED: 0 PER 100 WBC
OTHER OBSERVATIONS UA: ABNORMAL
PDW BLD-RTO: 17.2 % (ref 11.8–14.4)
PH UA: 6.5 (ref 5–8)
PHOSPHORUS: 2.5 MG/DL (ref 2.6–4.5)
PLATELET # BLD: 267 K/UL (ref 138–453)
PLATELET ESTIMATE: ABNORMAL
PMV BLD AUTO: 11.8 FL (ref 8.1–13.5)
POTASSIUM SERPL-SCNC: 3.7 MMOL/L (ref 3.7–5.3)
PROTEIN UA: ABNORMAL
PROTHROMBIN TIME: 10.4 SEC (ref 9–12)
RBC # BLD: 4.16 M/UL (ref 3.95–5.11)
RBC # BLD: ABNORMAL 10*6/UL
RBC UA: ABNORMAL /HPF (ref 0–2)
RENAL EPITHELIAL, UA: ABNORMAL /HPF
SEG NEUTROPHILS: 56 % (ref 36–65)
SEGMENTED NEUTROPHILS ABSOLUTE COUNT: 4.88 K/UL (ref 1.5–8.1)
SODIUM BLD-SCNC: 139 MMOL/L (ref 135–144)
SPECIFIC GRAVITY UA: 1.01 (ref 1–1.03)
T3 FREE: 1.33 PG/ML (ref 2.02–4.43)
THYROXINE, FREE: 0.55 NG/DL (ref 0.93–1.7)
TRICHOMONAS: ABNORMAL
TROPONIN INTERP: ABNORMAL
TROPONIN INTERP: ABNORMAL
TROPONIN T: ABNORMAL NG/ML
TROPONIN T: ABNORMAL NG/ML
TROPONIN, HIGH SENSITIVITY: 10 NG/L (ref 0–14)
TROPONIN, HIGH SENSITIVITY: 9 NG/L (ref 0–14)
TSH SERPL DL<=0.05 MIU/L-ACNC: 181.26 MIU/L (ref 0.3–5)
TURBIDITY: CLEAR
URINE HGB: ABNORMAL
UROBILINOGEN, URINE: NORMAL
WBC # BLD: 8.6 K/UL (ref 3.5–11.3)
WBC # BLD: ABNORMAL 10*3/UL
WBC UA: ABNORMAL /HPF (ref 0–5)
YEAST: ABNORMAL

## 2020-09-06 PROCEDURE — 81001 URINALYSIS AUTO W/SCOPE: CPT

## 2020-09-06 PROCEDURE — 84481 FREE ASSAY (FT-3): CPT

## 2020-09-06 PROCEDURE — 36415 COLL VENOUS BLD VENIPUNCTURE: CPT

## 2020-09-06 PROCEDURE — 83874 ASSAY OF MYOGLOBIN: CPT

## 2020-09-06 PROCEDURE — G0378 HOSPITAL OBSERVATION PER HR: HCPCS

## 2020-09-06 PROCEDURE — 6370000000 HC RX 637 (ALT 250 FOR IP): Performed by: INTERNAL MEDICINE

## 2020-09-06 PROCEDURE — 2500000003 HC RX 250 WO HCPCS: Performed by: INTERNAL MEDICINE

## 2020-09-06 PROCEDURE — 85610 PROTHROMBIN TIME: CPT

## 2020-09-06 PROCEDURE — 84484 ASSAY OF TROPONIN QUANT: CPT

## 2020-09-06 PROCEDURE — 80069 RENAL FUNCTION PANEL: CPT

## 2020-09-06 PROCEDURE — 84439 ASSAY OF FREE THYROXINE: CPT

## 2020-09-06 PROCEDURE — 6370000000 HC RX 637 (ALT 250 FOR IP): Performed by: NURSE PRACTITIONER

## 2020-09-06 PROCEDURE — 85025 COMPLETE CBC W/AUTO DIFF WBC: CPT

## 2020-09-06 PROCEDURE — 83735 ASSAY OF MAGNESIUM: CPT

## 2020-09-06 PROCEDURE — 2580000003 HC RX 258: Performed by: NURSE PRACTITIONER

## 2020-09-06 PROCEDURE — 74177 CT ABD & PELVIS W/CONTRAST: CPT

## 2020-09-06 PROCEDURE — 99232 SBSQ HOSP IP/OBS MODERATE 35: CPT | Performed by: INTERNAL MEDICINE

## 2020-09-06 PROCEDURE — 84443 ASSAY THYROID STIM HORMONE: CPT

## 2020-09-06 PROCEDURE — 6360000004 HC RX CONTRAST MEDICATION: Performed by: INTERNAL MEDICINE

## 2020-09-06 PROCEDURE — 99254 IP/OBS CNSLTJ NEW/EST MOD 60: CPT | Performed by: INTERNAL MEDICINE

## 2020-09-06 RX ORDER — CHOLESTYRAMINE LIGHT 4 G/5.7G
4 POWDER, FOR SUSPENSION ORAL 2 TIMES DAILY
Status: DISCONTINUED | OUTPATIENT
Start: 2020-09-06 | End: 2020-09-08 | Stop reason: HOSPADM

## 2020-09-06 RX ADMIN — ACETAMINOPHEN 650 MG: 325 TABLET ORAL at 11:31

## 2020-09-06 RX ADMIN — SODIUM CHLORIDE, PRESERVATIVE FREE 10 ML: 5 INJECTION INTRAVENOUS at 08:40

## 2020-09-06 RX ADMIN — POTASSIUM & SODIUM PHOSPHATES POWDER PACK 280-160-250 MG 250 MG: 280-160-250 PACK at 17:11

## 2020-09-06 RX ADMIN — IBUPROFEN 800 MG: 800 TABLET, FILM COATED ORAL at 08:40

## 2020-09-06 RX ADMIN — CHOLESTYRAMINE 4 G: 4 POWDER, FOR SUSPENSION ORAL at 17:12

## 2020-09-06 RX ADMIN — IOHEXOL 75 ML: 350 INJECTION, SOLUTION INTRAVENOUS at 16:13

## 2020-09-06 RX ADMIN — LEVOTHYROXINE SODIUM ANHYDROUS 100 MCG: 100 INJECTION, POWDER, LYOPHILIZED, FOR SOLUTION INTRAVENOUS at 08:39

## 2020-09-06 RX ADMIN — POTASSIUM & SODIUM PHOSPHATES POWDER PACK 280-160-250 MG 250 MG: 280-160-250 PACK at 20:24

## 2020-09-06 RX ADMIN — IBUPROFEN 800 MG: 800 TABLET, FILM COATED ORAL at 17:14

## 2020-09-06 RX ADMIN — SODIUM CHLORIDE, PRESERVATIVE FREE 10 ML: 5 INJECTION INTRAVENOUS at 20:25

## 2020-09-06 ASSESSMENT — PAIN SCALES - GENERAL
PAINLEVEL_OUTOF10: 7

## 2020-09-06 NOTE — CARE COORDINATION
Case Management Initial Discharge Plan  Cherrie Maki,             Met with:patient to discuss discharge plans. Information verified: address, contacts, phone number, , insurance Yes    Emergency Contact/Next of Kin name & number: Dao Alexis [phone]    PCP: No primary care provider on file. Date of last visit: Sees Dr. Carolyn Garg last 1 week ago     Insurance Provider: Brayan Mcclellan      Discharge Planning    Living Arrangements:  Spouse/Significant Other, Children   Support Systems:  Family Members    Home has 1 stories  2 stairs to climb to get into front door    Patient able to perform ADL's:Independent    Current Services (outpatient & in home) none   DME equipment: na  DME provider: na    Receiving oral anticoagulation therapy? Denies     If indicated:   Physician managing anticoagulation treatment: na  Where does patient obtain lab work for ATC treatment? na      Potential Assistance Needed:  N/A    Patient agreeable to home care: No  [place] of choice provided:  n/a    Prior SNF/Rehab Placement and Facility: None   Agreeable to SNF/Rehab: No  [place] of choice provided: n/a     Evaluation: no    Expected Discharge date:  20    Patient expects to be discharged to:  home  Follow Up Appointment: Best Day/ Time:      Transportation provider:    Transportation arrangements needed for discharge: No    Readmission Risk              Risk of Unplanned Readmission:        0             Does patient have a readmission risk score greater than 14?: JASON   If yes, follow-up appointment must be made within 7 days of discharge.      Goals of Care: Get answers about swelling       Discharge Plan: Home Independently           Electronically signed by Jone Helms RN on 20 at 8:12 AM EDT

## 2020-09-06 NOTE — CONSULTS
Gastroenterology Consult Note      Patient: Edgard Brothers  : 1980  Acct#:  [de-identified]     Date:  2020    Subjective:       History of Present Illness  Patient is a 37 y.o.  female admitted with Fluid overload [E87.70]  Fluid overload [E87.70] who is seen in consult for diarrhea  This patient is a 42-year-old lady who is admitted at this time with swelling of her arms and legs, felt to be fluid overloaded by the primary team, she does have history of hypothyroidism related to thyroidectomy couple years ago, with difficulty controlling her thyroid function. She is staying on the hypothyroid with high TSH and low T4/T3, she has been gaining weight and noted to be with swelling of her arms and legs and even her face according to her for which she is admitted  She does have history of diarrhea  For which we are consulted  She think this diarrhea has started since her surgery for the thyroid, although she does have cholecystectomy 8 9 years ago. The diarrhea is 2-3 times a day watery explosive, she would have some cramps before the diarrhea which will resolve after having the bowel movement  Most of the system more postprandial associated with gas and bloating  She denied any black stool or blood in the stool  She denied any fever or chills  She is gaining weight rather than losing weight  She denied any odynophagia dysphagia or any upper GI symptoms  She has never had any endoscopies  She denied any NSAIDs use  And she has not had any GI work-up for this diarrhea.     Number had pancreatic issues  And no family history of any cancer or neuroendocrine tumors  Her work-up this admission showed AST of 36 ALT of 21 alkaline phosphatase 65  CBC is all normal            Past Medical History:   Diagnosis Date    Carpal tunnel syndrome     Enlarged thyroid 10/30/2018    H/O D&C () 2017    H/O LEEP () 2017    H/O SAB x 4 2017    Incomplete miscarriage 2017  Miscarriage     Psychiatric problem     Thyroid disease     Thyroiditis 12/2/2017      Past Surgical History:   Procedure Laterality Date    CHOLECYSTECTOMY      DILATION AND CURETTAGE OF UTERUS      DILATION AND CURETTAGE OF UTERUS  06/11/2017    LEEP      IN INDUCED ABORTN BY DIL/EVAC N/A 6/11/2017    DILATATION AND CURETTAGE SUCTION performed by Juan C Diamond DO at [address] [place] [address],[address]        Past Endoscopic History none    Admission Meds  No current facility-administered medications on file prior to encounter. Current Outpatient Medications on File Prior to Encounter   Medication Sig Dispense Refill    levothyroxine (SYNTHROID) 200 MCG tablet Take 200 mcg by mouth Daily      ibuprofen (ADVIL;MOTRIN) 800 MG tablet Take 1 tablet by mouth every 8 hours as needed for Pain 30 tablet 0       Patient   Does Use ASA, NSAID No  Allergies  Allergies   Allergen Reactions    Doxycycline Anaphylaxis    Cefaclor         Social   Social History     Tobacco Use    Smoking status: Current Every Day Smoker     Packs/day: 0.50     Years: 23.00     Pack years: 11.50     Types: Cigarettes    Smokeless tobacco: Never Used   Substance Use Topics    Alcohol use: No        PSYCH HISTORY:  Depression No  Anxiety No  Suicide No       Family History   Problem Relation Age of Onset    Diabetes Mother     Mental Illness Mother     Substance Abuse Father       No family history of colon cancer, Crohn's disease, or ulcerative colitis. Review of Systems  Constitutional: negative  Eyes: negative  Ears, nose, mouth, throat, and face: negative  Respiratory: negative  Cardiovascular: negative  Gastrointestinal: negative  Genitourinary:negative  Integument/breast: negative  Hematologic/lymphatic: negative  Musculoskeletal:negative  Endocrine: negative           Physical Exam  Blood pressure (!) 147/91, pulse 80, temperature 98.1 °F (36.7 °C), temperature source Oral, resp.  rate 16, height 5' 4\" (1.626 m), weight 194 lb 3.2 oz (88.1 kg), SpO2 97 %, unknown if currently breastfeeding. General Appearance: alert and oriented to person, place and time, well-developed and well-nourished, in no acute distress  Skin: warm and dry, no rash or erythema  Head: normocephalic and atraumatic  Eyes: pupils equal, round, and reactive to light, extraocular eye movements intact, conjunctivae normal  ENT: hearing grossly normal bilaterally  Neck: neck supple and non tender without mass, no thyromegaly or thyroid nodules, no cervical lymphadenopathy   Pulmonary/Chest: clear to auscultation bilaterally- no wheezes, rales or rhonchi, normal air movement, no respiratory distress  Cardiovascular: normal rate, regular rhythm, normal S1 and S2, no murmurs, rubs, clicks or gallops, distal pulses intact, no carotid bruits  Abdomen: soft, non-tender, non-distended, normal bowel sounds, no masses or organomegaly  Extremities: no cyanosis, clubbing or edema  Musculoskeletal: normal range of motion, no joint swelling, deformity or tenderness  Neurologic: no cranial nerve deficit and muscle strength normal    Data Review:    Recent Labs     09/05/20  1036 09/06/20  0752   WBC 12.3* 8.6   HGB 13.1 12.4   HCT 39.2 38.5   MCV 91.4 92.5    267     Recent Labs     09/05/20  1036 09/06/20  0752    139   K 3.4* 3.7   CL 99 103   CO2 24 22   PHOS  --  2.5*   BUN 9 10   CREATININE 1.04* 1.00*     Recent Labs     09/05/20  1036   AST 36*   ALT 21   BILITOT 0.15*   ALKPHOS 65     No results for input(s): LIPASE, AMYLASE in the last 72 hours. Recent Labs     09/06/20  0752   PROTIME 10.4   INR 1.0     No results for input(s): PTT in the last 72 hours. No results for input(s): OCCULTBLD in the last 72 hours.   CEA:  No results found for: CEA  Ca 125:  No results found for:   Ca 19-9:  No results found for:   Ca 15-3:  No results found for:   AFP:  No components found for: AFAFP  Beta HCG:  No components found for:

## 2020-09-06 NOTE — PROGRESS NOTES
[facility]  Office: [address], DO, Roland Mcgrath, DO, Golden Pittsalf, DO, Brijesh Eden Blood, DO, Eduarda Dias MD, Bria Pope MD, Alphonse Ocasio MD, Martita Peña MD, Shena Marlow MD, Madai Alcaraz MD, Jeremias Morgan MD, Rosita Estrada MD, Robyn Coronado MD, Jared Her DO, Tobi Menendez MD, Nyoka Hodgkins, MD, Gómez Christie DO, Jadene Boast, MD,  Michael Cui DO, Gomez Leigh MD, Ben Perry MD, Amanda Slater [facility], [facility], CNP, Arjun Case, CNP, Romario Abraham, CNS, Saray Werner, CNP, Heather Stewart, CNP, Daniella Foot, CNP, Polly Gardner, CNP, Marilee Fay, CNP, Shelley Pink PA-C, Wilner Nash DNP, Nabil Moore, CNP, Kerry Erazo, CNP, Colleen Ontiveros, CNP, Terrance Butler, CNP, Ian Degroot, [address] [place] [address]    Progress Note    9/6/2020    1:14 PM    Name:   Debo Garcia  MRN:     [id number]     Acct:      [de-identified]   Room:   [facility]4677-89   Day:  0  Admit Date:  9/5/2020  9:58 AM    PCP:   No primary care provider on file. Code Status:  Full Code    Subjective:     C/C:   Chief Complaint   Patient presents with    Facial Swelling     noticed swelling in face four days ago    Leg Swelling     bilateral arm and leg swelling for three weeks. Interval History Status: improved. Pt seen and examined. No acute events overnight. Feels improved this morning however still has pain in her hands and diffuse swelling. TSH this morning 181.26, previously 230.3. T4: 0.55, up from 0.5. She denies any chest pain shortness of breath, nausea, vomiting or diarrhea. Pt states she takes her Synthroid every morning at 7 am on an empty stomach. Spoke with [facility] who confirm that she is picking up her medication regularly. Pt also complains of 4-6 episodes of \"explosive\" diarrhea per day. She complains of worsening bloating and feeling \"gassy\" .  She is not a heavy Dairy user and has not noticed an association between her symptoms and increased daily/Carbohydrate use. She has no history of Celiac disease . No H.pylori. She is also being treated for Vitamin D deficiency. She has no abdominal pain or cramping. Brief History: This is a 37year old female who presents with worsening hypothyroidism with complaints of swelling generalized, along with severe carpel tunnel syndrome. On admission, TSH: 230 T4:0. 5. Pt states she takes her Synthroid every morning at 7 am on an empty stomach. Spoke with [facility] who confirm that she is picking up her medication regularly. Pt also complains of 4-6 episodes of \"explosive\" diarrhea per day. She complains of worsening bloating and feeling \"gassy\" . She is not a heavy Dairy user and has not noticed an association between her symptoms and increased daily/Carbohydrate use. She has no history of Celiac disease . No H.pylori. She is also being treated for Vitamin D deficiency. She has no abdominal pain or cramping. She was treated with IV synthroid with improvement in her TSH and T4. Review of Systems:     Constitutional:  negative for chills, fevers, sweats  Respiratory:  negative for cough, dyspnea on exertion, shortness of breath, wheezing  Cardiovascular:  negative for chest pain, chest pressure/discomfort, positive for  lower extremity edema, no palpitations  Gastrointestinal:  + for abdominal fullness, bloating, and diarrhea. Neurological:  Admits to Carpel tunnel syndrome. Medications: Allergies:     Allergies   Allergen Reactions    Doxycycline Anaphylaxis    Cefaclor        Current Meds:   Scheduled Meds:    sodium chloride flush  10 mL Intravenous 2 times per day    enoxaparin  40 mg Subcutaneous Daily    influenza virus vaccine  0.5 mL Intramuscular Once    levothyroxine  100 mcg Intravenous Daily    And    sodium chloride (PF)  5 mL Intravenous Daily     Continuous Infusions:   PRN Meds: ibuprofen, sodium chloride flush, potassium chloride **OR** potassium alternative oral replacement **OR** potassium chloride, magnesium sulfate, acetaminophen **OR** acetaminophen, promethazine **OR** ondansetron, nicotine    Data:     Past Medical History:   has a past medical history of Carpal tunnel syndrome, Enlarged thyroid, H/O D&C (), H/O LEEP (), H/O SAB x 4, Incomplete miscarriage, Miscarriage, Psychiatric problem, Thyroid disease, and Thyroiditis. Social History:   reports that she has been smoking cigarettes. She has a 11.50 pack-year smoking history. She has never used smokeless tobacco. She reports that she does not drink alcohol or use drugs. Family History:   Family History   Problem Relation Age of Onset    Diabetes Mother     Mental Illness Mother     Substance Abuse Father        Vitals:  BP (!) 148/92   Pulse 62   Temp 98.6 °F (37 °C) (Oral)   Resp 18   Ht 5' 4\" (1.626 m)   Wt 194 lb 3.2 oz (88.1 kg)   SpO2 100%   BMI 33.33 kg/m²   Temp (24hrs), Av.4 °F (36.9 °C), Min:98.1 °F (36.7 °C), Max:98.6 °F (37 °C)    No results for input(s): POCGLU in the last 72 hours. I/O (24Hr):   No intake or output data in the 24 hours ending 20 1314    Labs:  Hematology:  Recent Labs     20  0752   WBC 12.3*  --  8.6   RBC 4.29  --  4.16   HGB 13.1  --  12.4   HCT 39.2  --  38.5   MCV 91.4  --  92.5   MCH 30.5  --  29.8   MCHC 33.4  --  32.2   RDW 17.0*  --  17.2*     --  267   MPV 11.9  --  11.8   SEDRATE  --  48*  --    CRP  --  2.9  --    INR  --   --  1.0     Chemistry:  Recent Labs     20  1036 20  0752     --  139   K 3.4*  --  3.7   CL 99  --  103   CO2 24  --  22   GLUCOSE 100*  --  93   BUN 9  --  10   CREATININE 1.04*  --  1.00*   MG  --   --  2.3   ANIONGAP 13  --  14   LABGLOM 58*  --  >60   GFRAA >60  --  >60   CALCIUM 8.7  --  8.4*   PHOS  --   --  2.5*   PROBNP  --  39  --    TROPHS  --   --  9 CKTOTAL  --  1,461*  --    MYOGLOBIN  --   --  77*     Recent Labs     09/05/20  1036 09/06/20  0752   PROT 7.7  --    LABALBU 4.5 4.2   .30* 181.26*   AST 36*  --    ALT 21  --    ALKPHOS 65  --    BILITOT 0.15*  --      ABG:No results found for: POCPH, PHART, PH, POCPCO2, SFJ0YNI, PCO2, POCPO2, PO2ART, PO2, POCHCO3, VOD5VRD, HCO3, NBEA, PBEA, BEART, BE, THGBART, THB, LSC3TYD, WQID9EPE, K7LEYYZG, O2SAT, FIO2  Lab Results   Component Value Date/Time    SPECIAL NOT REPORTED 01/25/2020 11:06 AM     Lab Results   Component Value Date/Time    CULTURE NO SIGNIFICANT GROWTH 01/13/2012 01:21 AM    CULTURE  01/13/2012 01:21 AM     Performed at [address] [place] [address] 3 [phone]       Radiology:  Xr Chest Portable    Result Date: 9/5/2020  No acute process. Physical Examination:        General appearance:  alert, cooperative and no distress  Mental Status:  oriented to person, place and time and normal affect  Lungs:  clear to auscultation bilaterally, normal effort  Heart:  regular rate and rhythm, no murmur  Abdomen:  soft, nontender, nondistended, normal bowel sounds, no masses, hepatomegaly, splenomegaly  Extremities: severe pain with palpation of upper extremities. Skin:  no gross lesions, rashes, induration. Skin is taught, painful to palpation. Assessment:        Hospital Problems           Last Modified POA    Hypothyroidism 9/5/2020 Yes    Pharyngoesophageal dysphagia 9/5/2020 Yes    Hypokalemia 9/5/2020 Yes    Fluid overload 9/5/2020 Yes    Other fatigue 9/5/2020 Yes    Elevated blood pressure reading 9/5/2020 Yes    Dysphonia 9/5/2020 Yes    Pharyngitis 9/5/2020 Yes    Right hand weakness 9/5/2020 Yes          Plan:        1. Symptomatic Hypothyroidism: Etiology unclear, ? If related to malabsorption syndrome vs noncompliance with therapy (Doubtful as pt has been routinely picking up her medication from her pharmacy). Continue IV synthroid, daily TSH/T4.  Gi consult to evaluate for possible colonoscopy  2. Abdominal bloating and diarrhea: GI following  3. Vitamin D deficiency: replace  4. Carpel tunnel syndrome: treat hypothyroidism  5. Obesity :weight loss and lifestyle recommendations.      Gómez Ramirez,   9/6/2020  1:14 PM

## 2020-09-07 LAB
ABSOLUTE EOS #: 0.3 K/UL (ref 0–0.44)
ABSOLUTE IMMATURE GRANULOCYTE: 0.05 K/UL (ref 0–0.3)
ABSOLUTE LYMPH #: 2.39 K/UL (ref 1.1–3.7)
ABSOLUTE MONO #: 0.51 K/UL (ref 0.1–1.2)
ADRENOCORTICOTROPIC HORMONE: 9 PG/ML (ref 6–58)
ALBUMIN SERPL-MCNC: 4.2 G/DL (ref 3.5–5.2)
ANION GAP SERPL CALCULATED.3IONS-SCNC: 14 MMOL/L (ref 9–17)
BASOPHILS # BLD: 1 % (ref 0–2)
BASOPHILS ABSOLUTE: 0.09 K/UL (ref 0–0.2)
BUN BLDV-MCNC: 13 MG/DL (ref 6–20)
BUN/CREAT BLD: ABNORMAL (ref 9–20)
CALCIUM SERPL-MCNC: 8.7 MG/DL (ref 8.6–10.4)
CHLORIDE BLD-SCNC: 103 MMOL/L (ref 98–107)
CO2: 21 MMOL/L (ref 20–31)
CREAT SERPL-MCNC: 0.89 MG/DL (ref 0.5–0.9)
DIFFERENTIAL TYPE: ABNORMAL
EOSINOPHILS RELATIVE PERCENT: 3 % (ref 1–4)
GFR AFRICAN AMERICAN: >60 ML/MIN
GFR NON-AFRICAN AMERICAN: >60 ML/MIN
GFR SERPL CREATININE-BSD FRML MDRD: ABNORMAL ML/MIN/{1.73_M2}
GFR SERPL CREATININE-BSD FRML MDRD: ABNORMAL ML/MIN/{1.73_M2}
GLIADIN DEAMINIDATED PEPTIDE AB IGA: 8.3 U/ML
GLIADIN DEAMINIDATED PEPTIDE AB IGG: 0.5 U/ML
GLUCOSE BLD-MCNC: 109 MG/DL (ref 70–99)
HCT VFR BLD CALC: 36.8 % (ref 36.3–47.1)
HEMOGLOBIN: 12.4 G/DL (ref 11.9–15.1)
IGA: 234 MG/DL (ref 70–400)
IMMATURE GRANULOCYTES: 0 %
LYMPHOCYTES # BLD: 21 % (ref 24–43)
MAGNESIUM: 2.1 MG/DL (ref 1.6–2.6)
MCH RBC QN AUTO: 30.8 PG (ref 25.2–33.5)
MCHC RBC AUTO-ENTMCNC: 33.7 G/DL (ref 28.4–34.8)
MCV RBC AUTO: 91.5 FL (ref 82.6–102.9)
MONOCYTES # BLD: 4 % (ref 3–12)
NRBC AUTOMATED: 0 PER 100 WBC
PDW BLD-RTO: 17.2 % (ref 11.8–14.4)
PHOSPHORUS: 3.6 MG/DL (ref 2.6–4.5)
PLATELET # BLD: 262 K/UL (ref 138–453)
PLATELET ESTIMATE: ABNORMAL
PMV BLD AUTO: 12.1 FL (ref 8.1–13.5)
POTASSIUM SERPL-SCNC: 3.1 MMOL/L (ref 3.7–5.3)
RBC # BLD: 4.02 M/UL (ref 3.95–5.11)
RBC # BLD: ABNORMAL 10*6/UL
SEG NEUTROPHILS: 71 % (ref 36–65)
SEGMENTED NEUTROPHILS ABSOLUTE COUNT: 8.34 K/UL (ref 1.5–8.1)
SODIUM BLD-SCNC: 138 MMOL/L (ref 135–144)
T3 FREE: 1.4 PG/ML (ref 2.02–4.43)
THYROXINE, FREE: 0.55 NG/DL (ref 0.93–1.7)
TISSUE TRANSGLUTAMINASE IGA: 1.4 U/ML
TSH SERPL DL<=0.05 MIU/L-ACNC: 209.2 MIU/L (ref 0.3–5)
WBC # BLD: 11.7 K/UL (ref 3.5–11.3)
WBC # BLD: ABNORMAL 10*3/UL

## 2020-09-07 PROCEDURE — G0378 HOSPITAL OBSERVATION PER HR: HCPCS

## 2020-09-07 PROCEDURE — 6370000000 HC RX 637 (ALT 250 FOR IP): Performed by: NURSE PRACTITIONER

## 2020-09-07 PROCEDURE — G0008 ADMIN INFLUENZA VIRUS VAC: HCPCS | Performed by: INTERNAL MEDICINE

## 2020-09-07 PROCEDURE — 2580000003 HC RX 258: Performed by: NURSE PRACTITIONER

## 2020-09-07 PROCEDURE — 2060000000 HC ICU INTERMEDIATE R&B

## 2020-09-07 PROCEDURE — 84443 ASSAY THYROID STIM HORMONE: CPT

## 2020-09-07 PROCEDURE — 2580000003 HC RX 258: Performed by: INTERNAL MEDICINE

## 2020-09-07 PROCEDURE — 2500000003 HC RX 250 WO HCPCS: Performed by: INTERNAL MEDICINE

## 2020-09-07 PROCEDURE — 6360000002 HC RX W HCPCS: Performed by: INTERNAL MEDICINE

## 2020-09-07 PROCEDURE — 94760 N-INVAS EAR/PLS OXIMETRY 1: CPT

## 2020-09-07 PROCEDURE — 90686 IIV4 VACC NO PRSV 0.5 ML IM: CPT | Performed by: INTERNAL MEDICINE

## 2020-09-07 PROCEDURE — 99232 SBSQ HOSP IP/OBS MODERATE 35: CPT | Performed by: INTERNAL MEDICINE

## 2020-09-07 PROCEDURE — 84439 ASSAY OF FREE THYROXINE: CPT

## 2020-09-07 PROCEDURE — 83735 ASSAY OF MAGNESIUM: CPT

## 2020-09-07 PROCEDURE — 85025 COMPLETE CBC W/AUTO DIFF WBC: CPT

## 2020-09-07 PROCEDURE — 80069 RENAL FUNCTION PANEL: CPT

## 2020-09-07 PROCEDURE — 6370000000 HC RX 637 (ALT 250 FOR IP): Performed by: INTERNAL MEDICINE

## 2020-09-07 PROCEDURE — 84481 FREE ASSAY (FT-3): CPT

## 2020-09-07 PROCEDURE — 36415 COLL VENOUS BLD VENIPUNCTURE: CPT

## 2020-09-07 RX ADMIN — SODIUM CHLORIDE, PRESERVATIVE FREE 10 ML: 5 INJECTION INTRAVENOUS at 21:26

## 2020-09-07 RX ADMIN — SODIUM CHLORIDE, PRESERVATIVE FREE 10 ML: 5 INJECTION INTRAVENOUS at 09:00

## 2020-09-07 RX ADMIN — POTASSIUM CHLORIDE 40 MEQ: 1500 TABLET, EXTENDED RELEASE ORAL at 16:40

## 2020-09-07 RX ADMIN — POTASSIUM & SODIUM PHOSPHATES POWDER PACK 280-160-250 MG 250 MG: 280-160-250 PACK at 08:54

## 2020-09-07 RX ADMIN — Medication 5 ML: at 08:53

## 2020-09-07 RX ADMIN — ACETAMINOPHEN 650 MG: 325 TABLET ORAL at 23:25

## 2020-09-07 RX ADMIN — LEVOTHYROXINE SODIUM ANHYDROUS 100 MCG: 100 INJECTION, POWDER, LYOPHILIZED, FOR SOLUTION INTRAVENOUS at 08:53

## 2020-09-07 RX ADMIN — IBUPROFEN 800 MG: 800 TABLET, FILM COATED ORAL at 11:40

## 2020-09-07 RX ADMIN — INFLUENZA A VIRUS A/VICTORIA/2454/2019 IVR-207 (H1N1) ANTIGEN (PROPIOLACTONE INACTIVATED), INFLUENZA A VIRUS A/HONG KONG/2671/2019 IVR-208 (H3N2) ANTIGEN (PROPIOLACTONE INACTIVATED), INFLUENZA B VIRUS B/VICTORIA/705/2018 BVR-11 ANTIGEN (PROPIOLACTONE INACTIVATED), INFLUENZA B VIRUS B/PHUKET/3073/2013 BVR-1B ANTIGEN (PROPIOLACTONE INACTIVATED) 0.5 ML: 15; 15; 15; 15 INJECTION, SUSPENSION INTRAMUSCULAR at 09:04

## 2020-09-07 ASSESSMENT — PAIN SCALES - GENERAL
PAINLEVEL_OUTOF10: 6
PAINLEVEL_OUTOF10: 7
PAINLEVEL_OUTOF10: 6

## 2020-09-07 ASSESSMENT — PAIN DESCRIPTION - PAIN TYPE: TYPE: ACUTE PAIN

## 2020-09-07 ASSESSMENT — PAIN DESCRIPTION - LOCATION: LOCATION: BACK;HAND

## 2020-09-07 NOTE — PROGRESS NOTES
THE [facility] AT [facility]   Progress Note    Yamil French is a 37 y.o. female patient. Hospitalization Day:0      Chief consult reason: Diarrhea      Subjective:  Patient seen and examined bedside. Patient feels better stating improvement in her diarrhea. Last bowel movement was around 5 PM yesterday in the evening. States improvement in the edema of the extremities. Deaminated Gliadin Pedtide antibody IgA 8.3    VITALS:  BP (!) 140/90   Pulse 87   Temp 97.6 °F (36.4 °C) (Oral)   Resp 16   Ht 5' 4\" (1.626 m)   Wt 194 lb 3.2 oz (88.1 kg)   SpO2 97%   BMI 33.33 kg/m²   TEMPERATURE:  Current - Temp: 97.6 °F (36.4 °C);  Max - Temp  Av.5 °F (36.4 °C)  Min: 97.3 °F (36.3 °C)  Max: 97.6 °F (36.4 °C)    Physical Assessment:  General appearance:  alert, cooperative and no distress  Mental Status:  oriented to person, place and time and normal affect  Lungs:  clear to auscultation bilaterally, normal effort  Heart:  regular rate and rhythm, no murmur  Abdomen:  soft, nontender, nondistended, normal bowel sounds, no masses, hepatomegaly, splenomegaly  Extremities:  no edema, redness, tenderness in the calves  Skin:  no gross lesions, rashes, induration    Data Review:    Labs and Imaging:     CBC:  Recent Labs     20  1036 20  0752 20  0621   WBC 12.3* 8.6 11.7*   HGB 13.1 12.4 12.4   MCV 91.4 92.5 91.5   RDW 17.0* 17.2* 17.2*    267 262       ANEMIA STUDIES:  Recent Labs     20  2046   LABIRON 23   TIBC 326   FERRITIN 35   [id number] 659   FOLATE 3.5*       BMP:  Recent Labs     20  1036 20  0752 20  0621    139 138   K 3.4* 3.7 3.1*   CL 99 103 103   CO2 24 22 21   BUN 9 10 13   CREATININE 1.04* 1.00* 0.89   GLUCOSE 100* 93 109*   CALCIUM 8.7 8.4* 8.7       LFTS:  Recent Labs     20  1036 20  0752 20  0621   ALKPHOS 65  --   --    ALT 21  --   --    AST 36*  --   --    BILITOT 0.15*  --   --    LABALBU 4.5 4.2 4.2 Amylase/Lipase and Ammonia:  No results for input(s): AMYLASE, LIPASE, AMMONIA in the last 72 hours. Acute Hepatitis Panel:  No results found for: HEPBSAG, HEPCAB, HEPBIGM, HEPAIGM    HCV Genotype:  No results found for: HEPATITISCGENOTYPE    HCV Quantitative:  No results found for: HCVQNT    LIVER WORK UP:    AFP  No results found for: AFP    Alpha 1 antitrypsin   No results found for: A1A    Anti - Liver/Kidney Ab  No results found for: LIVER-KIDNEYMICROSOMALAB    PETER  No results found for: PETER    AMA  No results found for: MITOAB    ASMA  No results found for: SMOOTHMUSCAB    Ceruloplasmin  No results found for: CERULOPLSM    Celiac panel  Lab Results   Component Value Date    TTGIGA PENDING 09/05/2020     09/05/2020       PT/INR  Recent Labs     09/06/20  0752   PROTIME 10.4   INR 1.0       Cancer Markers:  CEA:  No results for input(s): CEA in the last 72 hours. Ca 125:  No results for input(s):  in the last 72 hours. Ca 19-9:   Invalid input(s):   AFP: No results for input(s): AFP in the last 72 hours. Lactic acid:Invalid input(s): LACTIC ACID    Radiology Review:    No results found. Principal Problem:    Hypothyroidism  Active Problems:    Pharyngoesophageal dysphagia    Hypokalemia    Fluid overload    Other fatigue    Elevated blood pressure reading    Dysphonia    Pharyngitis    Right hand weakness    Hypophosphatemia    Obesity (BMI 30-39. 9)  Resolved Problems:    * No resolved hospital problems. *       GI Assessment and plan:    Diarrhea  -Resolving.   Last bowel movement yesterday at 5PM  -CT abdomen pelvis - prior cholecystectomy, no evidence of small bowel obstruction no intra-abdominal or intrapelvic abnormality.  -Celiac sprue markers -DM elevated gliadin peptide AB IgA 8.3  -EGD colonoscopy to be planned outpatient  -Patient continued on cholestyramine    Hypothyroidism post thyroidectomy  -.20, free T3 1.40, free thyroxine 0.55  -100 mcg total IV daily    GI will sign off    Marlon Ventura MD  RESIDENT, [facility]  9/7/2020,11:31 AM       Attending Physician Statement  I have discussed the care of Cherrie Maki and   I have examined the patient myselft independently, and taken ros and hpi , including pertinent history and exam findings,  with the author of this note . I have reviewed the key elements of all parts of the encounter with the nurse practitioner/resident.     I agree with the assessment, plan and orders as documented by the above health care provider         Electronically signed by Heavenly Mandel MD

## 2020-09-07 NOTE — PLAN OF CARE
Report called to Obie George on 4B with good understanding. Patient transferred with belongings to Room 422. All questions answered.

## 2020-09-07 NOTE — PROGRESS NOTES
picking up her medication regularly. Pt also complains of 4-6 episodes of \"explosive\" diarrhea per day. She complains of worsening bloating and feeling \"gassy\" . She is not a heavy Dairy user and has not noticed an association between her symptoms and increased daily/Carbohydrate use. She has no history of Celiac disease . No H.pylori. She is also being treated for Vitamin D deficiency. She has no abdominal pain or cramping. She was treated with IV synthroid with improvement in her TSH and T4. Review of Systems:     Constitutional:  negative for chills, fevers, sweats  Respiratory:  negative for cough, dyspnea on exertion, shortness of breath, wheezing  Cardiovascular:  negative for chest pain, chest pressure/discomfort, lower extremity edema, palpitations  Gastrointestinal:  negative for abdominal pain, constipation, diarrhea, nausea, vomiting  Neurological:  negative for dizziness, headache admits to CTS      Medications: Allergies: Allergies   Allergen Reactions    Doxycycline Anaphylaxis    Cefaclor        Current Meds:   Scheduled Meds:    potassium & sodium phosphates  1 packet Oral 4x Daily    cholestyramine light  4 g Oral BID    sodium chloride flush  10 mL Intravenous 2 times per day    enoxaparin  40 mg Subcutaneous Daily    levothyroxine  100 mcg Intravenous Daily    And    sodium chloride (PF)  5 mL Intravenous Daily     Continuous Infusions:   PRN Meds: ibuprofen, sodium chloride flush, potassium chloride **OR** potassium alternative oral replacement **OR** potassium chloride, magnesium sulfate, acetaminophen **OR** acetaminophen, promethazine **OR** ondansetron, nicotine    Data:     Past Medical History:   has a past medical history of Carpal tunnel syndrome, Enlarged thyroid, H/O D&C (2001), H/O LEEP (1997), H/O SAB x 4, Incomplete miscarriage, Miscarriage, Psychiatric problem, Thyroid disease, and Thyroiditis. Social History:   reports that she has been smoking cigarettes.  She has a 11.50 pack-year smoking history. She has never used smokeless tobacco. She reports that she does not drink alcohol or use drugs. Family History:   Family History   Problem Relation Age of Onset    Diabetes Mother     Mental Illness Mother     Substance Abuse Father        Vitals:  BP (!) 140/90   Pulse 87   Temp 97.6 °F (36.4 °C) (Oral)   Resp 16   Ht 5' 4\" (1.626 m)   Wt 194 lb 3.2 oz (88.1 kg)   SpO2 97%   BMI 33.33 kg/m²   Temp (24hrs), Av.5 °F (36.4 °C), Min:97.3 °F (36.3 °C), Max:97.6 °F (36.4 °C)    No results for input(s): POCGLU in the last 72 hours. I/O (24Hr):   No intake or output data in the 24 hours ending 20 1359    Labs:  Hematology:  Recent Labs     20  0621   WBC 12.3*  --  8.6 11.7*   RBC 4.29  --  4.16 4.02   HGB 13.1  --  12.4 12.4   HCT 39.2  --  38.5 36.8   MCV 91.4  --  92.5 91.5   MCH 30.5  --  29.8 30.8   MCHC 33.4  --  32.2 33.7   RDW 17.0*  --  17.2* 17.2*     --  267 262   MPV 11.9  --  11.8 12.1   SEDRATE  --  48*  --   --    CRP  --  2.9  --   --    INR  --   --  1.0  --      Chemistry:  Recent Labs     20  10320  1254 20  0621     --  139  --  138   K 3.4*  --  3.7  --  3.1*   CL 99  --  103  --  103   CO2 24  --  22  --  21   GLUCOSE 100*  --  93  --  109*   BUN 9  --  10  --  13   CREATININE 1.04*  --  1.00*  --  0.89   MG  --   --  2.3  --  2.1   ANIONGAP 13  --  14  --  14   LABGLOM 58*  --  >60  --  >60   GFRAA >60  --  >60  --  >60   CALCIUM 8.7  --  8.4*  --  8.7   PHOS  --   --  2.5*  --  3.6   PROBNP  --  39  --   --   --    TROPHS  --   --  9 10  --    CKTOTAL  --  1,461*  --   --   --    MYOGLOBIN  --   --  77* 120*  --      Recent Labs     20  1036 20  0752 20  0621   PROT 7.7  --   --    LABALBU 4.5 4.2 4.2   .30* 181.26* 209.20*   AST 36*  --   --    ALT 21  --   --    ALKPHOS 65  --   --    BILITOT 0.15*  --   --      ABG:No results found for: POCPH, PHART, PH, POCPCO2, LEN9ZIY, PCO2, POCPO2, PO2ART, PO2, POCHCO3, XWT2KCW, HCO3, NBEA, PBEA, BEART, BE, THGBART, THB, BTA9IBB, RJMK9QGH, S3ZXAGXC, O2SAT, FIO2  Lab Results   Component Value Date/Time    SPECIAL NOT REPORTED 01/25/2020 11:06 AM     Lab Results   Component Value Date/Time    CULTURE NO SIGNIFICANT GROWTH 01/13/2012 01:21 AM    CULTURE  01/13/2012 01:21 AM     Performed at [address] [place] [address] 3 [phone]       Radiology:  Ct Abdomen Pelvis W Iv Contrast Additional Contrast? None    Result Date: 9/6/2020  1. Prior cholecystectomy. 2. Normal appendix. 3. Moderate stool burden. 4. No evidence for small bowel obstruction. 5. No acute intra-abdominal or intrapelvic abnormality identified by CT. Xr Chest Portable    Result Date: 9/5/2020  No acute process. Physical Examination:        General appearance:  alert, cooperative and appears uncomfortable. Mental Status:  oriented to person, place and time and normal affect  Lungs:  clear to auscultation bilaterally, normal effort  Heart:  regular rate and rhythm, no murmur  Abdomen:  soft, nontender, nondistended, normal bowel sounds, no masses, hepatomegaly, splenomegaly  Extremities: severe pain with palpation of upper extremities, skin tightness noted. .   Skin:  no gross lesions, rashes, induration. Skin is taught, painful to palpation. Assessment:        Hospital Problems           Last Modified POA    * (Principal) Hypothyroidism 9/6/2020 Yes    Pharyngoesophageal dysphagia 9/5/2020 Yes    Hypokalemia 9/5/2020 Yes    Fluid overload 9/5/2020 Yes    Other fatigue 9/5/2020 Yes    Elevated blood pressure reading 9/5/2020 Yes    Dysphonia 9/5/2020 Yes    Pharyngitis 9/5/2020 Yes    Right hand weakness 9/5/2020 Yes    Hypophosphatemia 9/6/2020 Yes    Obesity (BMI 30-39.9) 9/6/2020 Yes          Plan:        1. Symptomatic Hypothyroidism: Etiology unclear, ?  If related to malabsorption syndrome vs noncompliance with therapy (Doubtful as pt has been routinely picking up her medication from her pharmacy). Continue IV synthroid, daily TSH/T4. Echocardiogram shows EF of 57%. ACTH, cortisol pending. 2. Abdominal bloating and diarrhea: GI following, celiac markers ordered and pending. Cholestyramine packets ordered as well. 3. Vitamin D deficiency: replace  4. Carpel tunnel syndrome: treat hypothyroidism  5.  Obesity :weight loss and lifestyle recommendations.        Jerrell Gustafson DO  9/7/2020  1:59 PM

## 2020-09-08 VITALS
BODY MASS INDEX: 33.16 KG/M2 | HEIGHT: 64 IN | DIASTOLIC BLOOD PRESSURE: 93 MMHG | WEIGHT: 194.2 LBS | RESPIRATION RATE: 17 BRPM | TEMPERATURE: 98.4 F | OXYGEN SATURATION: 94 % | HEART RATE: 78 BPM | SYSTOLIC BLOOD PRESSURE: 130 MMHG

## 2020-09-08 PROBLEM — E03.9 HYPOTHYROID MYOPATHY: Status: ACTIVE | Noted: 2020-09-08

## 2020-09-08 PROBLEM — R53.83 OTHER FATIGUE: Status: RESOLVED | Noted: 2020-09-05 | Resolved: 2020-09-08

## 2020-09-08 PROBLEM — E87.6 HYPOKALEMIA: Status: RESOLVED | Noted: 2018-10-31 | Resolved: 2020-09-08

## 2020-09-08 PROBLEM — Z86.39 H/O: HYPOTHYROIDISM: Status: ACTIVE | Noted: 2020-09-08

## 2020-09-08 PROBLEM — E89.0 H/O TOTAL THYROIDECTOMY: Status: ACTIVE | Noted: 2020-09-08

## 2020-09-08 PROBLEM — J02.9 PHARYNGITIS: Status: RESOLVED | Noted: 2020-09-05 | Resolved: 2020-09-08

## 2020-09-08 PROBLEM — R74.8 ELEVATED CPK: Status: ACTIVE | Noted: 2020-09-08

## 2020-09-08 PROBLEM — R89.7 ELEVATED MYOGLOBIN LEVEL: Status: ACTIVE | Noted: 2020-09-08

## 2020-09-08 PROBLEM — M62.82 NON-TRAUMATIC RHABDOMYOLYSIS: Status: ACTIVE | Noted: 2020-09-08

## 2020-09-08 PROBLEM — G56.01 CARPAL TUNNEL SYNDROME ON RIGHT: Status: ACTIVE | Noted: 2020-09-08

## 2020-09-08 PROBLEM — E53.8 FOLATE DEFICIENCY: Status: ACTIVE | Noted: 2020-09-08

## 2020-09-08 PROBLEM — R13.14 PHARYNGOESOPHAGEAL DYSPHAGIA: Status: RESOLVED | Noted: 2018-10-31 | Resolved: 2020-09-08

## 2020-09-08 PROBLEM — E87.70 FLUID OVERLOAD: Status: RESOLVED | Noted: 2020-09-05 | Resolved: 2020-09-08

## 2020-09-08 PROBLEM — G73.7 HYPOTHYROID MYOPATHY: Status: ACTIVE | Noted: 2020-09-08

## 2020-09-08 PROBLEM — R29.898 RIGHT HAND WEAKNESS: Status: RESOLVED | Noted: 2020-09-05 | Resolved: 2020-09-08

## 2020-09-08 LAB
-: NORMAL
ABSOLUTE EOS #: 0.33 K/UL (ref 0–0.44)
ABSOLUTE IMMATURE GRANULOCYTE: 0.06 K/UL (ref 0–0.3)
ABSOLUTE LYMPH #: 1.92 K/UL (ref 1.1–3.7)
ABSOLUTE MONO #: 0.48 K/UL (ref 0.1–1.2)
ALBUMIN SERPL-MCNC: 4 G/DL (ref 3.5–5.2)
ANION GAP SERPL CALCULATED.3IONS-SCNC: 14 MMOL/L (ref 9–17)
ANTI DNA DOUBLE STRANDED: 0 IU/ML
ANTI JO-1 IGG: 31 U/ML
ANTI SSB: 7 U/ML
ANTI-NUCLEAR ANTIBODY (ANA): NEGATIVE
ANTI-SMITH: 8 U/ML
BASOPHILS # BLD: 1 % (ref 0–2)
BASOPHILS ABSOLUTE: 0.09 K/UL (ref 0–0.2)
BUN BLDV-MCNC: 11 MG/DL (ref 6–20)
BUN/CREAT BLD: ABNORMAL (ref 9–20)
CALCIUM SERPL-MCNC: 8.8 MG/DL (ref 8.6–10.4)
CHLORIDE BLD-SCNC: 104 MMOL/L (ref 98–107)
CO2: 20 MMOL/L (ref 20–31)
CORTISOL COLLECTION INFO: NORMAL
CORTISOL: 10.3 UG/DL (ref 2.7–18.4)
CREAT SERPL-MCNC: 0.76 MG/DL (ref 0.5–0.9)
DIFFERENTIAL TYPE: ABNORMAL
EOSINOPHILS RELATIVE PERCENT: 4 % (ref 1–4)
GFR AFRICAN AMERICAN: >60 ML/MIN
GFR NON-AFRICAN AMERICAN: >60 ML/MIN
GFR SERPL CREATININE-BSD FRML MDRD: ABNORMAL ML/MIN/{1.73_M2}
GFR SERPL CREATININE-BSD FRML MDRD: ABNORMAL ML/MIN/{1.73_M2}
GLUCOSE BLD-MCNC: 102 MG/DL (ref 70–99)
HCT VFR BLD CALC: 36.2 % (ref 36.3–47.1)
HEMOGLOBIN: 12.8 G/DL (ref 11.9–15.1)
HISTONE ANTIBODY: 14 U/ML
IMMATURE GRANULOCYTES: 1 %
LYMPHOCYTES # BLD: 22 % (ref 24–43)
MAGNESIUM: 2.4 MG/DL (ref 1.6–2.6)
MCH RBC QN AUTO: 31.4 PG (ref 25.2–33.5)
MCHC RBC AUTO-ENTMCNC: 35.4 G/DL (ref 28.4–34.8)
MCV RBC AUTO: 88.9 FL (ref 82.6–102.9)
MONOCYTES # BLD: 5 % (ref 3–12)
NRBC AUTOMATED: 0 PER 100 WBC
PDW BLD-RTO: 16.8 % (ref 11.8–14.4)
PHOSPHORUS: 3.1 MG/DL (ref 2.6–4.5)
PLATELET # BLD: 258 K/UL (ref 138–453)
PLATELET ESTIMATE: ABNORMAL
PMV BLD AUTO: 11.9 FL (ref 8.1–13.5)
POTASSIUM SERPL-SCNC: 4.2 MMOL/L (ref 3.7–5.3)
RBC # BLD: 4.07 M/UL (ref 3.95–5.11)
RBC # BLD: ABNORMAL 10*6/UL
REASON FOR REJECTION: NORMAL
SEG NEUTROPHILS: 67 % (ref 36–65)
SEGMENTED NEUTROPHILS ABSOLUTE COUNT: 6.04 K/UL (ref 1.5–8.1)
SODIUM BLD-SCNC: 138 MMOL/L (ref 135–144)
T3 FREE: 1.38 PG/ML (ref 2.02–4.43)
THYROXINE, FREE: 0.71 NG/DL (ref 0.93–1.7)
TSH SERPL DL<=0.05 MIU/L-ACNC: 203.6 MIU/L (ref 0.3–5)
VITAMIN B-12: 538 PG/ML (ref 232–1245)
WBC # BLD: 8.9 K/UL (ref 3.5–11.3)
WBC # BLD: ABNORMAL 10*3/UL
ZZ NTE CLEAN UP: ORDERED TEST: NORMAL
ZZ NTE WITH NAME CLEAN UP: SPECIMEN SOURCE: NORMAL

## 2020-09-08 PROCEDURE — 2580000003 HC RX 258: Performed by: INTERNAL MEDICINE

## 2020-09-08 PROCEDURE — 2500000003 HC RX 250 WO HCPCS: Performed by: INTERNAL MEDICINE

## 2020-09-08 PROCEDURE — 36415 COLL VENOUS BLD VENIPUNCTURE: CPT

## 2020-09-08 PROCEDURE — 6370000000 HC RX 637 (ALT 250 FOR IP): Performed by: INTERNAL MEDICINE

## 2020-09-08 PROCEDURE — 99239 HOSP IP/OBS DSCHRG MGMT >30: CPT | Performed by: INTERNAL MEDICINE

## 2020-09-08 PROCEDURE — 82533 TOTAL CORTISOL: CPT

## 2020-09-08 PROCEDURE — 82024 ASSAY OF ACTH: CPT

## 2020-09-08 PROCEDURE — 2580000003 HC RX 258: Performed by: NURSE PRACTITIONER

## 2020-09-08 PROCEDURE — 6370000000 HC RX 637 (ALT 250 FOR IP): Performed by: NURSE PRACTITIONER

## 2020-09-08 PROCEDURE — 80069 RENAL FUNCTION PANEL: CPT

## 2020-09-08 PROCEDURE — 82607 VITAMIN B-12: CPT

## 2020-09-08 PROCEDURE — 85025 COMPLETE CBC W/AUTO DIFF WBC: CPT

## 2020-09-08 PROCEDURE — G0378 HOSPITAL OBSERVATION PER HR: HCPCS

## 2020-09-08 PROCEDURE — 84481 FREE ASSAY (FT-3): CPT

## 2020-09-08 PROCEDURE — 83735 ASSAY OF MAGNESIUM: CPT

## 2020-09-08 PROCEDURE — 84443 ASSAY THYROID STIM HORMONE: CPT

## 2020-09-08 PROCEDURE — 84439 ASSAY OF FREE THYROXINE: CPT

## 2020-09-08 RX ORDER — LEVOTHYROXINE AND LIOTHYRONINE 57; 13.5 UG/1; UG/1
90 TABLET ORAL DAILY
Qty: 30 TABLET | Refills: 3 | Status: SHIPPED | OUTPATIENT
Start: 2020-09-08 | End: 2022-01-15

## 2020-09-08 RX ORDER — LEVOTHYROXINE SODIUM ANHYDROUS 100 UG/5ML
100 INJECTION, POWDER, LYOPHILIZED, FOR SOLUTION INTRAVENOUS ONCE
Status: COMPLETED | OUTPATIENT
Start: 2020-09-08 | End: 2020-09-08

## 2020-09-08 RX ORDER — SODIUM CHLORIDE 9 MG/ML
5 INJECTION INTRAVENOUS ONCE
Status: COMPLETED | OUTPATIENT
Start: 2020-09-08 | End: 2020-09-08

## 2020-09-08 RX ORDER — CHOLESTYRAMINE LIGHT 4 G/5.7G
4 POWDER, FOR SUSPENSION ORAL 2 TIMES DAILY
Qty: 14 PACKET | Refills: 0 | Status: SHIPPED | OUTPATIENT
Start: 2020-09-08 | End: 2022-01-15

## 2020-09-08 RX ORDER — FAMOTIDINE 20 MG/1
20 TABLET, FILM COATED ORAL 2 TIMES DAILY
Qty: 28 TABLET | Refills: 0 | Status: SHIPPED | OUTPATIENT
Start: 2020-09-08 | End: 2022-01-15

## 2020-09-08 RX ORDER — FOLIC ACID 1 MG/1
1 TABLET ORAL DAILY
Qty: 30 TABLET | Refills: 0 | Status: SHIPPED | OUTPATIENT
Start: 2020-09-08 | End: 2022-01-15

## 2020-09-08 RX ORDER — IBUPROFEN 800 MG/1
800 TABLET ORAL EVERY 8 HOURS PRN
Qty: 30 TABLET | Refills: 0 | Status: SHIPPED | OUTPATIENT
Start: 2020-09-08 | End: 2021-09-17

## 2020-09-08 RX ADMIN — LEVOTHYROXINE SODIUM ANHYDROUS 100 MCG: 100 INJECTION, POWDER, LYOPHILIZED, FOR SOLUTION INTRAVENOUS at 17:32

## 2020-09-08 RX ADMIN — LEVOTHYROXINE SODIUM ANHYDROUS 100 MCG: 100 INJECTION, POWDER, LYOPHILIZED, FOR SOLUTION INTRAVENOUS at 09:04

## 2020-09-08 RX ADMIN — SODIUM CHLORIDE, PRESERVATIVE FREE 10 ML: 5 INJECTION INTRAVENOUS at 09:13

## 2020-09-08 RX ADMIN — Medication 5 ML: at 09:03

## 2020-09-08 RX ADMIN — SODIUM CHLORIDE 5 ML: 9 INJECTION, SOLUTION INTRAMUSCULAR; INTRAVENOUS; SUBCUTANEOUS at 17:32

## 2020-09-08 RX ADMIN — THYROID, PORCINE 90 MG: 60 TABLET ORAL at 17:33

## 2020-09-08 RX ADMIN — ACETAMINOPHEN 650 MG: 325 TABLET ORAL at 09:04

## 2020-09-08 ASSESSMENT — PAIN DESCRIPTION - PAIN TYPE: TYPE: ACUTE PAIN

## 2020-09-08 ASSESSMENT — PAIN DESCRIPTION - PROGRESSION: CLINICAL_PROGRESSION: GRADUALLY IMPROVING

## 2020-09-08 ASSESSMENT — PAIN SCALES - GENERAL: PAINLEVEL_OUTOF10: 6

## 2020-09-08 ASSESSMENT — PAIN DESCRIPTION - LOCATION: LOCATION: GENERALIZED

## 2020-09-08 ASSESSMENT — PAIN DESCRIPTION - ONSET: ONSET: ON-GOING

## 2020-09-08 ASSESSMENT — PAIN DESCRIPTION - FREQUENCY: FREQUENCY: CONTINUOUS

## 2020-09-08 ASSESSMENT — PAIN DESCRIPTION - DESCRIPTORS: DESCRIPTORS: ACHING

## 2020-09-08 ASSESSMENT — PAIN - FUNCTIONAL ASSESSMENT: PAIN_FUNCTIONAL_ASSESSMENT: ACTIVITIES ARE NOT PREVENTED

## 2020-09-08 NOTE — PROGRESS NOTES
CLINICAL PHARMACY NOTE: MEDS TO [address] Select Patient?: No  Total # of Prescriptions Filled: 3   The following medications were delivered to the patient:  · CHOLESTRAMINE PACK   · IBU   · ARMOUR THYROID   Total # of Interventions Completed: 0  Time Spent (min): 0    Additional Documentation:

## 2020-09-08 NOTE — CARE COORDINATION
Discharge [facility] Case Management Department  Written by: Madina Victor RN    Patient Name: Si Sandifer  Attending Provider: Alyse Vargas MD  Admit Date: 2020  9:58 AM  MRN: [id number]  Account: [de-identified]                     : 1980  Discharge Date:       Disposition: home. Spoke with pt. No needs.     Madina Victor RN

## 2020-09-08 NOTE — PLAN OF CARE
Problem: Pain:  Goal: Pain level will decrease  Description: Pain level will decrease  Outcome: Ongoing  Note: Pt able to communicate needs for pain medications and states satisfaction with outcome.        Problem: Pain:  Goal: Control of acute pain  Description: Control of acute pain  Outcome: Ongoing     Problem: Pain:  Goal: Control of chronic pain  Description: Control of chronic pain  Outcome: Ongoing

## 2020-09-08 NOTE — PLAN OF CARE
Problem: Pain:  Goal: Pain level will decrease  Description: Pain level will decrease  9/8/2020 1704 by Qasim Campbell RN  Outcome: Ongoing  9/8/2020 0556 by Lynn Esteban RN  Outcome: Ongoing  Note: Pt able to communicate needs for pain medications and states satisfaction with outcome.     Goal: Control of acute pain  Description: Control of acute pain  9/8/2020 1704 by Qasim Campbell RN  Outcome: Ongoing  9/8/2020 0556 by Lynn Esteban RN  Outcome: Ongoing  Goal: Control of chronic pain  Description: Control of chronic pain  9/8/2020 1704 by Qasim Campbell RN  Outcome: Ongoing  9/8/2020 0556 by Lynn Esteban RN  Outcome: Ongoing

## 2020-09-08 NOTE — DISCHARGE SUMMARY
[facility] Discharge Summary-Internal Medicine. Patient Identification:   Parvin Nickerson   : 1980  MRN: [id number]   Account: [de-identified]      Patient's PCP: No primary care provider on file. 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 9) [E66.9] 2020    Elevated blood pressure reading [R03.0] 2020    Dysphonia [R49.0] 2020    Severe hypothyroidism [E03.8] 2017       The patient was seen and examined on day of discharge and this discharge summary is in conjunction with any daily progress note from day of discharge. Hospital Course:   Parvin Nickerson is a 37 y.o. female admitted to [facility] on 2020 for general body swelling -pulsatory facial swelling, bilateral upper and lower extremity swelling, worsening carpal tunnel symptoms right more than left due to uncontrolled severe hypothyroidism despite taking levothyroxine. TSH levels on admission was 230, T3 1.33 and free T4 0.50. History of total thyroidectomy and patient has been taking regular levothyroxine. TSH levels being higher and levothyroxine has been increased by PCP. Patient now complaining of diarrhea every time she takes levothyroxine. Suspected for GI malabsorption of levothyroxine. Patient reviewed by GI -diarrhea improved with IV levothyroxine and cholestyramine use. Patient was treated with IV levothyroxine 100 mcg once daily. Patient will be discharged home on [place] Thyroid 90 mg once daily. Patient advised to follow-up with orthopedic surgery for her symptomatic right carpal tunnel syndrome due to uncontrolled severe hypothyroidism. Nontraumatic rhabdomyolysis with elevated CPK due to hypothyroid induced myopathy. Total CK was 1461 on admission and myoglobin of 123. Folic acid deficiency. Folic acid 1 mg once daily prescribed. Stable for discharge home today. Follow-up with PCP in 1 week. HPI and Physical Exam:    Branden De León is a 37 y.o. Non-/non  female who presents with Facial Swelling (noticed swelling in face four days ago) and Leg Swelling (bilateral arm and leg swelling for three weeks. )   and is admitted to the hospital for the management of fluid overload, hypothyroidism.       Patient presents to the emergency room with a chief complaint of bilateral upper extremity and lower extremity swelling which has been ongoing and progressively worsening for the past month, with associated fatigue, weight gain, heat intolerance, exertional shortness of breath without orthopnea or cough. Patient denies chest pain dizziness lightheadedness palpitations. On chart review patient was recently seen and found to have a TSH nearly 300 with a low T4, levothyroxine dose was increased. Patient with complaints of right arm and hand weakness ongoing for the past month states she was seen and treated in the emergency room, diagnosed with carpal tunnel. No imaging done, positive TINEL on exam.      Work up in the emergency room .     Vitals:  Vitals:    09/07/20 1713 09/07/20 2218 09/08/20 0335 09/08/20 0715   BP: (!) 167/100 (!) 123/100 135/85 (!) 130/93   Pulse: 88 86 78    Resp: 16 14 17    Temp: 98.7 °F (37.1 °C) 98.2 °F (36.8 °C) 98.1 °F (36.7 °C) 98.4 °F (36.9 °C)   TempSrc: Oral Oral Oral Oral   SpO2: 99% 98% 94%    Weight:       Height:         Weight: Weight: 194 lb 3.2 oz (88.1 kg)     24 hour intake/output:    Intake/Output Summary (Last 24 hours) at 9/8/2020 1825  Last data filed at 9/8/2020 0725  Gross per 24 hour   Intake 10 ml   Output --   Net 10 ml       Physical Exam  Vitals signs and nursing note reviewed. Constitutional:       General: She is not in acute distress. Appearance: Normal appearance. She is obese. She is not ill-appearing, toxic-appearing or diaphoretic. HENT:      Head: Normocephalic and atraumatic. Nose: Nose normal. No congestion or rhinorrhea. Mouth/Throat:      Mouth: Mucous membranes are moist.      Pharynx: Oropharynx is clear. No oropharyngeal exudate or posterior oropharyngeal erythema. Eyes:      General: No scleral icterus. Right eye: No discharge. Left eye: No discharge. Extraocular Movements: Extraocular movements intact. Conjunctiva/sclera: Conjunctivae normal.      Pupils: Pupils are equal, round, and reactive to light. Neck:      Musculoskeletal: Normal range of motion and neck supple. No neck rigidity or muscular tenderness. Vascular: No carotid bruit. Cardiovascular:      Rate and Rhythm: Normal rate and regular rhythm. Pulses: Normal pulses. Heart sounds: Normal heart sounds. No murmur. No friction rub. No gallop. Pulmonary:      Effort: Pulmonary effort is normal. No respiratory distress. Breath sounds: Normal breath sounds. No stridor. No wheezing, rhonchi or rales. Chest:      Chest wall: No tenderness. Abdominal:      General: Abdomen is flat. Bowel sounds are normal. There is no distension. Palpations: Abdomen is soft. There is no mass. Tenderness: There is no abdominal tenderness. There is no right CVA tenderness, left CVA tenderness, guarding or rebound. Hernia: No hernia is present.    Musculoskeletal: Normal range of motion. General: No swelling, tenderness, deformity or signs of injury. Right lower leg: No edema. Left lower leg: No edema. Lymphadenopathy:      Cervical: No cervical adenopathy. Skin:     General: Skin is warm and dry. Coloration: Skin is not jaundiced or pale. Findings: No bruising, erythema, lesion or rash. Neurological:      General: No focal deficit present. Mental Status: She is alert and oriented to person, place, and time. Mental status is at baseline. Cranial Nerves: No cranial nerve deficit. Sensory: No sensory deficit. Motor: No weakness. Coordination: Coordination normal.      Gait: Gait normal.      Deep Tendon Reflexes: Reflexes normal.   Psychiatric:         Mood and Affect: Mood normal.         Behavior: Behavior normal.         Thought Content: Thought content normal.         Judgment: Judgment normal.       CBC:    Lab Results   Component Value Date    WBC 8.9 09/08/2020    HGB 12.8 09/08/2020    HCT 36.2 09/08/2020     09/08/2020     04/13/2012       Renal:    Lab Results   Component Value Date     09/08/2020    K 4.2 09/08/2020     09/08/2020    CO2 20 09/08/2020    BUN 11 09/08/2020    CREATININE 0.76 09/08/2020    CALCIUM 8.8 09/08/2020    PHOS 3.1 09/08/2020     Results for Flo Fraction (MRN [id number]) as of 9/8/2020 09:45    Ref. Range 9/5/2020 10:36   Prolactin Latest Ref Range: 4.79 - 23.30 ug/L 21.69      Results for Flo Fraction (MRN [id number]) as of 9/8/2020 09:45    Ref. Range 9/5/2020 10:36 9/8/2020 06:01   Cortisol Latest Ref Range: 2.7 - 18.4 ug/dL 8.9 10.3   Cortisol Collection Info Unknown NOT REPORTED NOT REPORTED   ACTH Latest Ref Range: 6 - 58 pg/mL 9        Results for Flo Fraction (MRN [id number]) as of 9/8/2020 09:45    Ref.  Range 9/5/2020 10:36 9/6/2020 07:52 9/7/2020 06:21 9/8/2020 06:01   TSH Latest Ref Range: 0.30 - 5.00 mIU/L 230.30 (H) 181.26 (H) 209.20 (H) 203.60 (H) (MRN [id number]) as of 9/8/2020 09:45    Ref. Range 9/5/2020 20:46   Gliadin Deaminidated Peptide AB IGA Latest Ref Range: <7.0 U/mL 8.3 (H)      TUMOR MARKER. None.     MICROBIOLOGY   None.     HISTOPATHOLOGY. None.     TOXICOLOGY. None.     ENDOSCOPE STUDIES. None.     PROCEDURES. None.     RADIOLOGY. Echocardiogram-09/05/2020. CONCLUSION:  Left ventricle is normal in size.      Global left ventricular systolic function is normal.     Calculated ejection fraction 57% by Heart Model.     No significant valvular abnormalities.     CT abdomen pelvis without contrast 09/06/2020. FINDINGS:  Lower Chest:   Mild bibasilar respiratory motion and atelectasis.       No free intra-abdominal air.     Organs:   Fatty liver.       Prior cholecystectomy.       Spleen, adrenal glands, pancreas, and kidneys grossly unremarkable in appearance.       No obstructing calculus, hydronephrosis, or hydroureter.     GI/Bowel: Moderate stool burden.       No significant dilation of small bowel loops to suggest small bowel obstruction.       Normal appendix which is gas-filled.     Pelvis:   Uterus and adnexa grossly unremarkable for the patient's age.       Pelvic phleboliths.       Slight filling of the urinary bladder.       No inguinal or pelvic sidewall lymphadenopathy.     Peritoneum/Retroperitoneum:   Atheromatous plaque and atherosclerotic calcification of the abdominal aorta and branch   vasculature.       Psoas muscles normal in size and symmetric in appearance.       No retroperitoneal lymphadenopathy.     Bones/Soft Tissues:   Minimal diffuse degenerative changes throughout the spine.     IMPRESSION:  1. Prior cholecystectomy.     2. Normal appendix.     3. Moderate stool burden.     4. No evidence for small bowel obstruction.     5. No acute intra-abdominal or intrapelvic abnormality identified by CT.     CT soft tissue neck w/ contrast- 12/02/2017.   FINDINGS:  PHARYNX/LARYNX:    There are prominent bilateral adenoids, palatine and lingual tonsils, likely related to infection/inflammation.       The tongue is normal in appearance.       The valleculae, epiglottis, aryepiglottic folds and pyriform sinuses appear unremarkable.       The true and false vocal cords are normal in appearance.       No mass, drainable fluid collection or abscess is seen.     SALIVARY GLANDS/THYROID:    The parotid and submandibular glands appear unremarkable.       There is thyroid goiter extending superiorly to the level of the cricoid and inferiorly to the level   of the superior border of the superior mediastinum, with the right lobe measuring 3.2 x 4.3 x 8.6  cm (transverse by AP by craniocaudal), with the left lobe measuring 3.0 x 4.2 x 7.4 cm, and the   isthmus measuring up to 2.1 cm in thickness.       There is no definite evidence of a dominant thyroid nodule.      There is mild narrowing of the trachea by the compression of the thyroid goiter.       There is likely mass effect upon the esophagus by  thyroid goiter extending to the lateral   aspects of the esophagus bilaterally.     LYMPH NODES:    There are borderline sized bilateral cervical lymph nodes,likely reactive.     SOFT TISSUES:    No appreciable soft tissue swelling or mass is seen.     BRAIN/ORBITS/SINUSES:    The visualized portion of the intracranial contents appear unremarkable.       The visualized portion of the orbits, paranasal sinuses and mastoid air cells demonstrate   no acute abnormality.     LUNG APICES/SUPERIOR MEDIASTINUM:    No focal consolidation is seen within the visualized lung apices.       No superior mediastinal lymphadenopathy or mass.     The visualized portion of the trachea appears unremarkable.     BONES:    There is likely a hemangioma in the T2 vertebral body.       There is straightening and mild reversal normal cervical lordosis.       Otherwise, no aggressive appearing lytic or blastic bony lesion.      IMPRESSION:  Thyroid goiter without dominant more than 30 minutes in the examination, evaluation, counseling and review of medications and discharge plan. Signed: Thank you No primary care provider on file. for the opportunity to be involved in this patient's care.     Electronically signed by Evaristo Chauhan MD on 9/8/2020 at 6:25 PM

## 2020-09-09 LAB — ADRENOCORTICOTROPIC HORMONE: 20 PG/ML (ref 6–58)

## 2020-09-13 LAB
EKG ATRIAL RATE: 67 BPM
EKG P AXIS: 58 DEGREES
EKG P-R INTERVAL: 184 MS
EKG Q-T INTERVAL: 424 MS
EKG QRS DURATION: 88 MS
EKG QTC CALCULATION (BAZETT): 448 MS
EKG R AXIS: 47 DEGREES
EKG T AXIS: 54 DEGREES
EKG VENTRICULAR RATE: 67 BPM

## 2021-02-17 ENCOUNTER — TELEPHONE (OUTPATIENT)
Dept: GASTROENTEROLOGY | Age: 41
End: 2021-02-17

## 2021-02-17 NOTE — TELEPHONE ENCOUNTER
Seen [facility] 9/2020 as inpt recommended EGD/colon as outpt; rec'd referral from pcp for GI consult. Writer attempted to contact pt by phone, but no answer and unable to leave msg due to vm is full.

## 2021-07-31 ENCOUNTER — APPOINTMENT (OUTPATIENT)
Dept: GENERAL RADIOLOGY | Age: 41
DRG: 427 | End: 2021-07-31
Payer: COMMERCIAL

## 2021-07-31 ENCOUNTER — HOSPITAL ENCOUNTER (INPATIENT)
Age: 41
LOS: 3 days | Discharge: HOME | DRG: 427 | End: 2021-08-03
Attending: EMERGENCY MEDICINE | Admitting: INTERNAL MEDICINE
Payer: COMMERCIAL

## 2021-07-31 DIAGNOSIS — R74.8 ELEVATED CK: ICD-10-CM

## 2021-07-31 DIAGNOSIS — M79.10 MUSCLE SORENESS: Primary | ICD-10-CM

## 2021-07-31 DIAGNOSIS — E89.0 POSTABLATIVE HYPOTHYROIDISM: ICD-10-CM

## 2021-07-31 DIAGNOSIS — E03.9 MYXEDEMA: ICD-10-CM

## 2021-07-31 PROBLEM — N17.9 AKI (ACUTE KIDNEY INJURY) (HCC): Status: ACTIVE | Noted: 2021-07-31

## 2021-07-31 LAB
-: ABNORMAL
ABSOLUTE EOS #: 0.36 K/UL (ref 0–0.44)
ABSOLUTE IMMATURE GRANULOCYTE: 0.06 K/UL (ref 0–0.3)
ABSOLUTE LYMPH #: 3.57 K/UL (ref 1.1–3.7)
ABSOLUTE MONO #: 0.45 K/UL (ref 0.1–1.2)
ALBUMIN SERPL-MCNC: 4.2 G/DL (ref 3.5–5.2)
ALBUMIN/GLOBULIN RATIO: 1.4 (ref 1–2.5)
ALP BLD-CCNC: 74 U/L (ref 35–104)
ALT SERPL-CCNC: 20 U/L (ref 5–33)
AMORPHOUS: ABNORMAL
ANION GAP SERPL CALCULATED.3IONS-SCNC: 12 MMOL/L (ref 9–17)
AST SERPL-CCNC: 28 U/L
BACTERIA: ABNORMAL
BASOPHILS # BLD: 1 % (ref 0–2)
BASOPHILS ABSOLUTE: 0.13 K/UL (ref 0–0.2)
BILIRUB SERPL-MCNC: 0.19 MG/DL (ref 0.3–1.2)
BILIRUBIN DIRECT: 0.09 MG/DL
BILIRUBIN URINE: NEGATIVE
BILIRUBIN, INDIRECT: 0.1 MG/DL (ref 0–1)
BUN BLDV-MCNC: 8 MG/DL (ref 6–20)
BUN/CREAT BLD: ABNORMAL (ref 9–20)
CALCIUM SERPL-MCNC: 9.3 MG/DL (ref 8.6–10.4)
CASTS UA: ABNORMAL /LPF (ref 0–8)
CHLORIDE BLD-SCNC: 98 MMOL/L (ref 98–107)
CO2: 25 MMOL/L (ref 20–31)
COLOR: YELLOW
CREAT SERPL-MCNC: 1.14 MG/DL (ref 0.5–0.9)
CRYSTALS, UA: ABNORMAL /HPF
DIFFERENTIAL TYPE: ABNORMAL
EOSINOPHILS RELATIVE PERCENT: 3 % (ref 1–4)
EPITHELIAL CELLS UA: ABNORMAL /HPF (ref 0–5)
GFR AFRICAN AMERICAN: >60 ML/MIN
GFR NON-AFRICAN AMERICAN: 52 ML/MIN
GFR SERPL CREATININE-BSD FRML MDRD: ABNORMAL ML/MIN/{1.73_M2}
GFR SERPL CREATININE-BSD FRML MDRD: ABNORMAL ML/MIN/{1.73_M2}
GLOBULIN: ABNORMAL G/DL (ref 1.5–3.8)
GLUCOSE BLD-MCNC: 133 MG/DL (ref 70–99)
GLUCOSE URINE: NEGATIVE
HCT VFR BLD CALC: 41.5 % (ref 36.3–47.1)
HEMOGLOBIN: 13.4 G/DL (ref 11.9–15.1)
IMMATURE GRANULOCYTES: 1 %
KETONES, URINE: NEGATIVE
LEUKOCYTE ESTERASE, URINE: ABNORMAL
LYMPHOCYTES # BLD: 29 % (ref 24–43)
MCH RBC QN AUTO: 29.2 PG (ref 25.2–33.5)
MCHC RBC AUTO-ENTMCNC: 32.3 G/DL (ref 28.4–34.8)
MCV RBC AUTO: 90.4 FL (ref 82.6–102.9)
MONOCYTES # BLD: 4 % (ref 3–12)
MUCUS: ABNORMAL
MYOGLOBIN: 132 NG/ML (ref 25–58)
NITRITE, URINE: NEGATIVE
NRBC AUTOMATED: 0 PER 100 WBC
OTHER OBSERVATIONS UA: ABNORMAL
PDW BLD-RTO: 14.8 % (ref 11.8–14.4)
PH UA: 7 (ref 5–8)
PLATELET # BLD: 281 K/UL (ref 138–453)
PLATELET ESTIMATE: ABNORMAL
PMV BLD AUTO: 11.5 FL (ref 8.1–13.5)
POTASSIUM SERPL-SCNC: 3.7 MMOL/L (ref 3.7–5.3)
PROTEIN UA: ABNORMAL
RBC # BLD: 4.59 M/UL (ref 3.95–5.11)
RBC # BLD: ABNORMAL 10*6/UL
RBC UA: ABNORMAL /HPF (ref 0–4)
RENAL EPITHELIAL, UA: ABNORMAL /HPF
SEG NEUTROPHILS: 62 % (ref 36–65)
SEGMENTED NEUTROPHILS ABSOLUTE COUNT: 7.81 K/UL (ref 1.5–8.1)
SODIUM BLD-SCNC: 135 MMOL/L (ref 135–144)
SPECIFIC GRAVITY UA: 1.01 (ref 1–1.03)
THYROXINE, FREE: <0.1 NG/DL (ref 0.93–1.7)
TOTAL CK: 941 U/L (ref 26–192)
TOTAL PROTEIN: 7.2 G/DL (ref 6.4–8.3)
TRICHOMONAS: ABNORMAL
TSH SERPL DL<=0.05 MIU/L-ACNC: 300.49 MIU/L (ref 0.3–5)
TURBIDITY: ABNORMAL
URINE HGB: NEGATIVE
UROBILINOGEN, URINE: NORMAL
WBC # BLD: 12.4 K/UL (ref 3.5–11.3)
WBC # BLD: ABNORMAL 10*3/UL
WBC UA: ABNORMAL /HPF (ref 0–5)
YEAST: ABNORMAL

## 2021-07-31 PROCEDURE — 84443 ASSAY THYROID STIM HORMONE: CPT

## 2021-07-31 PROCEDURE — 80076 HEPATIC FUNCTION PANEL: CPT

## 2021-07-31 PROCEDURE — 6370000000 HC RX 637 (ALT 250 FOR IP)

## 2021-07-31 PROCEDURE — 6370000000 HC RX 637 (ALT 250 FOR IP): Performed by: STUDENT IN AN ORGANIZED HEALTH CARE EDUCATION/TRAINING PROGRAM

## 2021-07-31 PROCEDURE — 85025 COMPLETE CBC W/AUTO DIFF WBC: CPT

## 2021-07-31 PROCEDURE — 2060000000 HC ICU INTERMEDIATE R&B

## 2021-07-31 PROCEDURE — 99223 1ST HOSP IP/OBS HIGH 75: CPT | Performed by: INTERNAL MEDICINE

## 2021-07-31 PROCEDURE — 99285 EMERGENCY DEPT VISIT HI MDM: CPT

## 2021-07-31 PROCEDURE — 80048 BASIC METABOLIC PNL TOTAL CA: CPT

## 2021-07-31 PROCEDURE — 82550 ASSAY OF CK (CPK): CPT

## 2021-07-31 PROCEDURE — 93005 ELECTROCARDIOGRAM TRACING: CPT | Performed by: GENERAL PRACTICE

## 2021-07-31 PROCEDURE — 83874 ASSAY OF MYOGLOBIN: CPT

## 2021-07-31 PROCEDURE — 94761 N-INVAS EAR/PLS OXIMETRY MLT: CPT

## 2021-07-31 PROCEDURE — 81001 URINALYSIS AUTO W/SCOPE: CPT

## 2021-07-31 PROCEDURE — 84439 ASSAY OF FREE THYROXINE: CPT

## 2021-07-31 PROCEDURE — 2580000003 HC RX 258: Performed by: STUDENT IN AN ORGANIZED HEALTH CARE EDUCATION/TRAINING PROGRAM

## 2021-07-31 PROCEDURE — 71045 X-RAY EXAM CHEST 1 VIEW: CPT

## 2021-07-31 PROCEDURE — 2500000003 HC RX 250 WO HCPCS: Performed by: STUDENT IN AN ORGANIZED HEALTH CARE EDUCATION/TRAINING PROGRAM

## 2021-07-31 RX ORDER — SODIUM CHLORIDE 9 MG/ML
5 INJECTION INTRAVENOUS DAILY
Status: DISCONTINUED | OUTPATIENT
Start: 2021-07-31 | End: 2021-08-03 | Stop reason: HOSPADM

## 2021-07-31 RX ORDER — ACETAMINOPHEN 325 MG/1
650 TABLET ORAL EVERY 6 HOURS PRN
Status: DISCONTINUED | OUTPATIENT
Start: 2021-07-31 | End: 2021-08-03 | Stop reason: HOSPADM

## 2021-07-31 RX ORDER — OXYCODONE HYDROCHLORIDE AND ACETAMINOPHEN 5; 325 MG/1; MG/1
1 TABLET ORAL EVERY 4 HOURS PRN
Status: DISCONTINUED | OUTPATIENT
Start: 2021-07-31 | End: 2021-08-03 | Stop reason: HOSPADM

## 2021-07-31 RX ORDER — LEVOTHYROXINE SODIUM ANHYDROUS 100 UG/5ML
50 INJECTION, POWDER, LYOPHILIZED, FOR SOLUTION INTRAVENOUS DAILY
Status: DISCONTINUED | OUTPATIENT
Start: 2021-07-31 | End: 2021-08-03 | Stop reason: HOSPADM

## 2021-07-31 RX ORDER — HEPARIN SODIUM 5000 [USP'U]/ML
5000 INJECTION, SOLUTION INTRAVENOUS; SUBCUTANEOUS EVERY 8 HOURS SCHEDULED
Status: DISCONTINUED | OUTPATIENT
Start: 2021-07-31 | End: 2021-08-03 | Stop reason: HOSPADM

## 2021-07-31 RX ORDER — POLYETHYLENE GLYCOL 3350 17 G/17G
17 POWDER, FOR SOLUTION ORAL DAILY PRN
Status: DISCONTINUED | OUTPATIENT
Start: 2021-07-31 | End: 2021-08-03 | Stop reason: HOSPADM

## 2021-07-31 RX ORDER — TRAMADOL HYDROCHLORIDE 50 MG/1
25 TABLET ORAL EVERY 6 HOURS PRN
Status: DISCONTINUED | OUTPATIENT
Start: 2021-07-31 | End: 2021-08-03 | Stop reason: HOSPADM

## 2021-07-31 RX ORDER — CHOLESTYRAMINE LIGHT 4 G/5.7G
4 POWDER, FOR SUSPENSION ORAL 2 TIMES DAILY
Status: DISCONTINUED | OUTPATIENT
Start: 2021-07-31 | End: 2021-08-03 | Stop reason: HOSPADM

## 2021-07-31 RX ORDER — FAMOTIDINE 20 MG/1
20 TABLET, FILM COATED ORAL 2 TIMES DAILY
Status: DISCONTINUED | OUTPATIENT
Start: 2021-07-31 | End: 2021-08-03 | Stop reason: HOSPADM

## 2021-07-31 RX ORDER — SODIUM CHLORIDE 9 MG/ML
25 INJECTION, SOLUTION INTRAVENOUS PRN
Status: DISCONTINUED | OUTPATIENT
Start: 2021-07-31 | End: 2021-08-03 | Stop reason: HOSPADM

## 2021-07-31 RX ORDER — ONDANSETRON 4 MG/1
4 TABLET, ORALLY DISINTEGRATING ORAL EVERY 8 HOURS PRN
Status: DISCONTINUED | OUTPATIENT
Start: 2021-07-31 | End: 2021-08-03 | Stop reason: HOSPADM

## 2021-07-31 RX ORDER — ONDANSETRON 2 MG/ML
4 INJECTION INTRAMUSCULAR; INTRAVENOUS EVERY 6 HOURS PRN
Status: DISCONTINUED | OUTPATIENT
Start: 2021-07-31 | End: 2021-08-03 | Stop reason: HOSPADM

## 2021-07-31 RX ORDER — SODIUM CHLORIDE 9 MG/ML
INJECTION, SOLUTION INTRAVENOUS CONTINUOUS
Status: DISCONTINUED | OUTPATIENT
Start: 2021-07-31 | End: 2021-08-03 | Stop reason: HOSPADM

## 2021-07-31 RX ORDER — ACETAMINOPHEN 650 MG/1
650 SUPPOSITORY RECTAL EVERY 6 HOURS PRN
Status: DISCONTINUED | OUTPATIENT
Start: 2021-07-31 | End: 2021-08-03 | Stop reason: HOSPADM

## 2021-07-31 RX ORDER — FOLIC ACID 1 MG/1
1 TABLET ORAL DAILY
Status: DISCONTINUED | OUTPATIENT
Start: 2021-07-31 | End: 2021-08-03 | Stop reason: HOSPADM

## 2021-07-31 RX ADMIN — SODIUM CHLORIDE: 9 INJECTION, SOLUTION INTRAVENOUS at 18:47

## 2021-07-31 RX ADMIN — SODIUM CHLORIDE 5 ML: 9 INJECTION, SOLUTION INTRAMUSCULAR; INTRAVENOUS; SUBCUTANEOUS at 18:48

## 2021-07-31 RX ADMIN — FOLIC ACID 1 MG: 1 TABLET ORAL at 18:47

## 2021-07-31 RX ADMIN — LEVOTHYROXINE SODIUM ANHYDROUS 50 MCG: 100 INJECTION, POWDER, LYOPHILIZED, FOR SOLUTION INTRAVENOUS at 18:48

## 2021-07-31 RX ADMIN — FAMOTIDINE 20 MG: 20 TABLET, FILM COATED ORAL at 20:58

## 2021-07-31 RX ADMIN — OXYCODONE HYDROCHLORIDE AND ACETAMINOPHEN 1 TABLET: 5; 325 TABLET ORAL at 20:58

## 2021-07-31 RX ADMIN — TRAMADOL HYDROCHLORIDE 25 MG: 50 TABLET, FILM COATED ORAL at 18:47

## 2021-07-31 ASSESSMENT — PAIN SCALES - GENERAL
PAINLEVEL_OUTOF10: 10

## 2021-07-31 ASSESSMENT — PAIN DESCRIPTION - LOCATION: LOCATION: GENERALIZED

## 2021-07-31 ASSESSMENT — PAIN DESCRIPTION - PAIN TYPE: TYPE: ACUTE PAIN

## 2021-07-31 ASSESSMENT — ENCOUNTER SYMPTOMS
VOICE CHANGE: 0
SHORTNESS OF BREATH: 0
COUGH: 0
ABDOMINAL PAIN: 0
TROUBLE SWALLOWING: 0

## 2021-07-31 NOTE — ED NOTES
Patient's O2 sat consistently 92% with good pleth. Checked on patient, patient resting comfortably, sleeping. Patient awoken and sat increased to 98% on RA. Patient placed on 2L oxygen via NC due to desatting while sleeping. Patient denies any SOB. Patient given pillow. Patient denies any other needs at this time.  Will continue to monitor     Erica Dominguez RN  07/31/21 [id number]

## 2021-07-31 NOTE — ED NOTES
Pt to ed from home, reports off tyroid meds since may due to them being \"recalled\" patient reports starting to have \"tightness and generalized pain\" this started approx 1 month ago and began worsening the past three weeks. Patient reports she was walking and her knees were tight and she fell a few days ago. Patient reports she did not hit head, fell to her knees, no loc. Patient is aox4, patient reports shortness of breath with exertion, denies chest pain.       Ella Monday, RN  07/31/21 7438

## 2021-07-31 NOTE — ED NOTES
Patient mom on phone, okay with patient to speak with her on the phone. States \"if I need to come up there I will to get her pain meds\" patient has not called out or complained of any pain. Writer spoke with patient, states she told the admitting physician for pain medications. Will administer PRN meds as indicated for patient pain.       Argelia Gilman RN  07/31/21 5649

## 2021-07-31 NOTE — H&P
[address]     Department of Internal Medicine - Staff Internal Medicine Teaching Service          ADMISSION NOTE/HISTORY AND PHYSICAL EXAMINATION   Date: 7/31/2021  Patient Name: Edgard Brothers  Date of admission: 7/31/2021 11:33 AM  YOB: 1980  PCP: Rosangela Justice MD  History Obtained From:  patient    CHIEF COMPLAINT     Chief complaint: Body pain and swelling    HISTORY OF PRESENTING ILLNESS     The patient is a pleasant 41 y.o. female presents with a chief complaint of body pain and swelling. She has a history of hypothyroidism since age 21 and has been on levothyroxine. Her thyroid grew larger over time she developed thyromegaly  And had total thyroidectomy in Feb 2019 for non-toxic multinodular goiter. She continued on levothyroxine until sep 2020 when she had worsening hypothyroidism with generalized swelling and was admitted. Her TSH was 230, T4 0.5. She was treated with IV synthroid with improvements. She was sent home on [place] thyroid 90mg daily. She mentions that she has not been taking the [place] thyroid as the drug had been recalled since May 2021. She is due to see her endocrinologist on 28 August 2021 but could not wait due to the pain and swelling. She has generalized swelling, non pitting, and her skin feels tense since about 3 weeks, also has generalized pain, especially on her lower limbs. Pain prevents her from walking around.    She has cold intolerance, asthenia, headaches, generalized polyarthralgia,       Review of Systems:  General ROS: Completed and except as mentioned above were negative   HEENT ROS: Completed and except as mentioned above were negative   Allergy and Immunology ROS:  Completed and except as mentioned above were negative  Hematological and Lymphatic ROS:  Completed and except as mentioned above were negative  Respiratory ROS:  Completed and except as mentioned above were negative  Cardiovascular ROS:  Completed and except as mentioned above were negative  Gastrointestinal ROS: Completed and except as mentioned above were negative  Genito-Urinary ROS:  Completed and except as mentioned above were negative  Musculoskeletal ROS:  Completed and except as mentioned above were negative  Neurological ROS:  Completed and except as mentioned above were negative  Skin & Dermatological ROS:  Completed and except as mentioned above were negative  Psychological ROS:  Completed and except as mentioned above were negative    PAST MEDICAL HISTORY     Past Medical History:   Diagnosis Date    Carpal tunnel syndrome     Enlarged thyroid 10/30/2018    H/O D&C (2001) 6/11/2017    H/O LEEP (1997) 6/11/2017    H/O SAB x 4 6/11/2017    Incomplete miscarriage 6/11/2017    Miscarriage     Psychiatric problem     Thyroid disease     Thyroiditis 12/2/2017       PAST SURGICAL HISTORY     Past Surgical History:   Procedure Laterality Date    CHOLECYSTECTOMY      DILATION AND CURETTAGE OF UTERUS      DILATION AND CURETTAGE OF UTERUS  06/11/2017    LEEP      MD INDUCED ABORTN BY DIL/EVAC N/A 6/11/2017    DILATATION AND CURETTAGE SUCTION performed by Ivet Bourne DO at [facility]     Doxycycline and Cefaclor    [address]     Prior to Admission medications    Medication Sig Start Date End Date Taking?  Authorizing Provider   ibuprofen (ADVIL;MOTRIN) 800 MG tablet Take 1 tablet by mouth every 8 hours as needed for Pain 9/8/20 9/15/20  Park Hartman MD   thyroid (ARMOUR) 90 MG tablet Take 1 tablet by mouth daily 9/8/20   Park Hartman MD   cholestyramine light 4 g packet Take 1 packet by mouth 2 times daily for 7 days 9/8/20 9/15/20  Park Hartman MD   famotidine (PEPCID) 20 MG tablet Take 1 tablet by mouth 2 times daily for 14 days 9/8/20 9/22/20  Park Hartman MD   folic acid (FOLVITE) 1 MG tablet Take 1 tablet by mouth daily 9/8/20 12/7/20  Park Hartman MD Warm and dry. Good color,  pigmentation. No lesions or scars.   No cyanosis or clubbing  Neurological/Psychiatric: The patient's general behavior, level of consciousness, thought content and emotional status is normal.          INVESTIGATIONS     Laboratory Testing:     Recent Results (from the past 24 hour(s))   CBC Auto Differential    Collection Time: 07/31/21 11:55 AM   Result Value Ref Range    WBC 12.4 (H) 3.5 - 11.3 k/uL    RBC 4.59 3.95 - 5.11 m/uL    Hemoglobin 13.4 11.9 - 15.1 g/dL    Hematocrit 41.5 36.3 - 47.1 %    MCV 90.4 82.6 - 102.9 fL    MCH 29.2 25.2 - 33.5 pg    MCHC 32.3 28.4 - 34.8 g/dL    RDW 14.8 (H) 11.8 - 14.4 %    Platelets 403 609 - 983 k/uL    MPV 11.5 8.1 - 13.5 fL    NRBC Automated 0.0 0.0 per 100 WBC    Differential Type NOT REPORTED     Seg Neutrophils 62 36 - 65 %    Lymphocytes 29 24 - 43 %    Monocytes 4 3 - 12 %    Eosinophils % 3 1 - 4 %    Basophils 1 0 - 2 %    Immature Granulocytes 1 (H) 0 %    Segs Absolute 7.81 1.50 - 8.10 k/uL    Absolute Lymph # 3.57 1.10 - 3.70 k/uL    Absolute Mono # 0.45 0.10 - 1.20 k/uL    Absolute Eos # 0.36 0.00 - 0.44 k/uL    Basophils Absolute 0.13 0.00 - 0.20 k/uL    Absolute Immature Granulocyte 0.06 0.00 - 0.30 k/uL    WBC Morphology NOT REPORTED     RBC Morphology ANISOCYTOSIS PRESENT     Platelet Estimate NOT REPORTED    BASIC METABOLIC PANEL    Collection Time: 07/31/21 11:55 AM   Result Value Ref Range    Glucose 133 (H) 70 - 99 mg/dL    BUN 8 6 - 20 mg/dL    CREATININE 1.14 (H) 0.50 - 0.90 mg/dL    Bun/Cre Ratio NOT REPORTED 9 - 20    Calcium 9.3 8.6 - 10.4 mg/dL    Sodium 135 135 - 144 mmol/L    Potassium 3.7 3.7 - 5.3 mmol/L    Chloride 98 98 - 107 mmol/L    CO2 25 20 - 31 mmol/L    Anion Gap 12 9 - 17 mmol/L    GFR Non-African American 52 (L) >60 mL/min    GFR African American >60 >60 mL/min    GFR Comment          GFR Staging NOT REPORTED    TSH with Reflex    Collection Time: 07/31/21 11:55 AM   Result Value Ref Range    .49 (H) 0.30 - 5.00 mIU/L   T4, FREE    Collection Time: 07/31/21 11:55 AM   Result Value Ref Range    Thyroxine, Free <0.10 (L) 0.93 - 1.70 ng/dL   CK    Collection Time: 07/31/21 11:55 AM   Result Value Ref Range    Total  (H) 26 - 192 U/L   MYOGLOBIN, SERUM    Collection Time: 07/31/21 11:55 AM   Result Value Ref Range    Myoglobin 132 (H) 25 - 58 ng/mL   Hepatic Function Panel    Collection Time: 07/31/21 11:55 AM   Result Value Ref Range    Albumin 4.2 3.5 - 5.2 g/dL    Alkaline Phosphatase 74 35 - 104 U/L    ALT 20 5 - 33 U/L    AST 28 <32 U/L    Total Bilirubin 0.19 (L) 0.3 - 1.2 mg/dL    Bilirubin, Direct 0.09 <0.31 mg/dL    Bilirubin, Indirect 0.10 0.00 - 1.00 mg/dL    Total Protein 7.2 6.4 - 8.3 g/dL    Globulin NOT REPORTED 1.5 - 3.8 g/dL    Albumin/Globulin Ratio 1.4 1.0 - 2.5   EKG 12 Lead    Collection Time: 07/31/21 12:20 PM   Result Value Ref Range    Ventricular Rate 69 BPM    Atrial Rate 69 BPM    P-R Interval 152 ms    QRS Duration 90 ms    Q-T Interval 368 ms    QTc Calculation (Bazett) 394 ms    P Axis 62 degrees    R Axis 62 degrees    T Axis 39 degrees   Urinalysis with Microscopic    Collection Time: 07/31/21 12:50 PM   Result Value Ref Range    Color, UA YELLOW YELLOW    Turbidity UA CLOUDY (A) CLEAR    Glucose, Ur NEGATIVE NEGATIVE    Bilirubin Urine NEGATIVE NEGATIVE    Ketones, Urine NEGATIVE NEGATIVE    Specific Gravity, UA 1.012 1.005 - 1.030    Urine Hgb NEGATIVE NEGATIVE    pH, UA 7.0 5.0 - 8.0    Protein, UA 1+ (A) NEGATIVE    Urobilinogen, Urine Normal Normal    Nitrite, Urine NEGATIVE NEGATIVE    Leukocyte Esterase, Urine TRACE (A) NEGATIVE    -          WBC, UA 10 TO 20 0 - 5 /HPF    RBC, UA 5 TO 10 0 - 4 /HPF    Casts UA  0 - 8 /LPF     5 TO 10 HYALINE Reference range defined for non-centrifuged specimen.     Crystals, UA NOT REPORTED None /HPF    Epithelial Cells UA 10 TO 20 0 - 5 /HPF    Renal Epithelial, UA NOT REPORTED 0 /HPF    Bacteria, UA MODERATE (A) None    Mucus, UA NOT

## 2021-07-31 NOTE — ED PROVIDER NOTES
[facility] [facility]     Emergency Department     Faculty Attestation    I performed a history and physical examination of the patient and discussed management with the resident. I reviewed the residents note and agree with the documented findings and plan of care. Any areas of disagreement are noted on the chart. I was personally present for the key portions of any procedures. I have documented in the chart those procedures where I was not present during the key portions. I have reviewed the emergency nurses triage note. I agree with the chief complaint, past medical history, past surgical history, allergies, medications, social and family history as documented unless otherwise noted below. For Physician Assistant/ Nurse Practitioner cases/documentation I have personally evaluated this patient and have completed at least one if not all key elements of the E/M (history, physical exam, and MDM). Additional findings are as noted. This patient was evaluated in the Emergency Department for symptoms described in the history of present illness. He/she was evaluated in the context of the global COVID-19 pandemic, which necessitated consideration that the patient might be at risk for infection with the SARS-CoV-2 virus that causes COVID-19. Institutional protocols and algorithms that pertain to the evaluation of patients at risk for COVID-19 are in a state of rapid change based on information released by regulatory bodies including the CDC and federal and state organizations. These policies and algorithms were followed during the patient's care in the ED. Patient here with concerns for edema swelling. History of thyroid disease. Had a similar presentation last fall when she was switched from levothyroxine to [place]. States over the last week progressively worsening edema painful extremities swelling of the face as well.   No respiratory distress speaking in full sentences no drooling stridor dysphonia. Does have symmetrical facial edema no posterior pharyngeal swelling. No respiratory distress lungs clear. Does have tense edema of the arms and legs. Will check full labs thyroid studies, possible admit      Critical Care     none    Kenya Diaz MD, Kwasi Vickers  Attending Emergency  Physician             Kenya Diaz MD  07/31/21 1140    EKG interpretation: Sinus rhythm 69. Normal intervals. Normal axis.   No acute ST or T changes, flat T waves throughout       Kenya Diaz MD  07/31/21 2156

## 2021-07-31 NOTE — ED PROVIDER NOTES
[facility]  ED  Emergency Department Encounter  EmergencyMedicine Resident     Pt Name:Cherrie Ott  MRN: [id number]  [place] 1980  Date of evaluation: 7/31/21  PCP:  Mary Beltran MD    CHIEF COMPLAINT       Chief Complaint   Patient presents with    Thyroid Problem       HISTORY OF PRESENT ILLNESS  (Location/Symptom, Timing/Onset, Context/Setting, Quality, Duration, Modifying Factors, Severity.)      Manny Mendoza is a 41 y.o. female who presents with generalized body swelling, facial swelling, patient states that she has severe hypothyroidism, has been admitted for this in the past, does have total thyroidectomy secondary to Hashimoto's. Patient was taking levothyroxine however states that her body rejected this and was started on [place] thyroid 90 mg once daily. Patient was admitted 6 months ago on 9/5/2020 with similar symptoms. Patient states that she does follow with endocrinologist but cannot get in until the end of August.  Patient states for the last 3 weeks the symptoms been worsening. Patient complained of pain under her tongue but no difficulty swallowing no difficulty breathing. Patient states that secondary to muscle pain she has had difficulty walking, did fall to hit her head not any blood thinners, no loss of consciousness. PAST MEDICAL / SURGICAL / SOCIAL / FAMILY HISTORY      has a past medical history of Carpal tunnel syndrome, Enlarged thyroid, H/O D&C (2001), H/O LEEP (1997), H/O SAB x 4, Incomplete miscarriage, Miscarriage, Psychiatric problem, Thyroid disease, and Thyroiditis. has a past surgical history that includes LEEP; Cholecystectomy; Dilation and curettage of uterus; Dilation and curettage of uterus (06/11/2017); pr induced abortn by dil/evac (N/A, 6/11/2017); and Thyroidectomy.     Social History     Socioeconomic History    Marital status:      Spouse name: Not on file    Number of children: Not on file    Years of education: Not on file    Highest education level: Not on file   Occupational History    Not on file   Tobacco Use    Smoking status: Current Every Day Smoker     Packs/day: 0.50     Years: 23.00     Pack years: 11.50     Types: Cigarettes    Smokeless tobacco: Never Used   Substance and Sexual Activity    Alcohol use: No    Drug use: No    Sexual activity: Not on file   Other Topics Concern    Not on file   Social History Narrative    Not on file     Social Determinants of Health     Financial Resource Strain:     Difficulty of Paying Living Expenses:    Food Insecurity:     Worried About Running Out of Food in the Last Year:     Ran Out of Food in the Last Year:    Transportation Needs:     Lack of Transportation (Medical):  Lack of Transportation (Non-Medical):    Physical Activity:     Days of Exercise per Week:     Minutes of Exercise per Session:    Stress:     Feeling of Stress :    Social Connections:     Frequency of Communication with Friends and Family:     Frequency of Social Gatherings with Friends and Family:     Attends [employer] Services:     Active Member of Clubs or Organizations:     Attends Club or Organization Meetings:     Marital Status:    Intimate Partner Violence:     Fear of Current or Ex-Partner:     Emotionally Abused:     Physically Abused:     Sexually Abused:        Family History   Problem Relation Age of Onset    Diabetes Mother     Mental Illness Mother     Substance Abuse Father        Allergies:  Doxycycline and Cefaclor    Home Medications:  Prior to Admission medications    Medication Sig Start Date End Date Taking?  Authorizing Provider   ibuprofen (ADVIL;MOTRIN) 800 MG tablet Take 1 tablet by mouth every 8 hours as needed for Pain 9/8/20 9/15/20  Trish De Santiago MD   thyroid (ARMOUR) 90 MG tablet Take 1 tablet by mouth daily 9/8/20   Trish De Santiago MD   cholestyramine light 4 g packet Take 1 packet by mouth 2 times daily for 7 days 9/8/20 distension. Palpations: Abdomen is soft. Tenderness: There is no abdominal tenderness. There is no guarding. Musculoskeletal:      Comments: Generalized swelling noted in the extremities, with tightness of the forearms, full range of motion no rash or skin changes. Abrasion to right medial aspect of knee   Neurological:      General: No focal deficit present. Mental Status: She is alert and oriented to person, place, and time. DIFFERENTIAL  DIAGNOSIS     PLAN (LABS / IMAGING / EKG):  Orders Placed This Encounter   Procedures    XR CHEST PORTABLE    CBC Auto Differential    BASIC METABOLIC PANEL    TSH with Reflex    T4, FREE    CK    MYOGLOBIN, SERUM    Hepatic Function Panel    Urinalysis with Microscopic    Inpatient consult to Internal Medicine    EKG 12 Lead    PATIENT STATUS (FROM ED OR OR/PROCEDURAL) Inpatient       MEDICATIONS ORDERED:  No orders of the defined types were placed in this encounter.       DDX: Hypothyroidism, Hashimoto's, euthyroid sick syndrome, rhabdomyolysis, compartment syndrome    DIAGNOSTIC RESULTS / EMERGENCY DEPARTMENT COURSE / MDM   :  Results for orders placed or performed during the hospital encounter of 07/31/21   CBC Auto Differential   Result Value Ref Range    WBC 12.4 (H) 3.5 - 11.3 k/uL    RBC 4.59 3.95 - 5.11 m/uL    Hemoglobin 13.4 11.9 - 15.1 g/dL    Hematocrit 41.5 36.3 - 47.1 %    MCV 90.4 82.6 - 102.9 fL    MCH 29.2 25.2 - 33.5 pg    MCHC 32.3 28.4 - 34.8 g/dL    RDW 14.8 (H) 11.8 - 14.4 %    Platelets 952 397 - 778 k/uL    MPV 11.5 8.1 - 13.5 fL    NRBC Automated 0.0 0.0 per 100 WBC    Differential Type NOT REPORTED     Seg Neutrophils 62 36 - 65 %    Lymphocytes 29 24 - 43 %    Monocytes 4 3 - 12 %    Eosinophils % 3 1 - 4 %    Basophils 1 0 - 2 %    Immature Granulocytes 1 (H) 0 %    Segs Absolute 7.81 1.50 - 8.10 k/uL    Absolute Lymph # 3.57 1.10 - 3.70 k/uL    Absolute Mono # 0.45 0.10 - 1.20 k/uL    Absolute Eos # 0.36 0.00 - 0.44 k/uL    Basophils Absolute 0.13 0.00 - 0.20 k/uL    Absolute Immature Granulocyte 0.06 0.00 - 0.30 k/uL    WBC Morphology NOT REPORTED     RBC Morphology ANISOCYTOSIS PRESENT     Platelet Estimate NOT REPORTED    BASIC METABOLIC PANEL   Result Value Ref Range    Glucose 133 (H) 70 - 99 mg/dL    BUN 8 6 - 20 mg/dL    CREATININE 1.14 (H) 0.50 - 0.90 mg/dL    Bun/Cre Ratio NOT REPORTED 9 - 20    Calcium 9.3 8.6 - 10.4 mg/dL    Sodium 135 135 - 144 mmol/L    Potassium 3.7 3.7 - 5.3 mmol/L    Chloride 98 98 - 107 mmol/L    CO2 25 20 - 31 mmol/L    Anion Gap 12 9 - 17 mmol/L    GFR Non-African American 52 (L) >60 mL/min    GFR African American >60 >60 mL/min    GFR Comment          GFR Staging NOT REPORTED    TSH with Reflex   Result Value Ref Range    .49 (H) 0.30 - 5.00 mIU/L   T4, FREE   Result Value Ref Range    Thyroxine, Free <0.10 (L) 0.93 - 1.70 ng/dL   CK   Result Value Ref Range    Total  (H) 26 - 192 U/L   MYOGLOBIN, SERUM   Result Value Ref Range    Myoglobin 132 (H) 25 - 58 ng/mL   Hepatic Function Panel   Result Value Ref Range    Albumin 4.2 3.5 - 5.2 g/dL    Alkaline Phosphatase 74 35 - 104 U/L    ALT 20 5 - 33 U/L    AST 28 <32 U/L    Total Bilirubin 0.19 (L) 0.3 - 1.2 mg/dL    Bilirubin, Direct 0.09 <0.31 mg/dL    Bilirubin, Indirect 0.10 0.00 - 1.00 mg/dL    Total Protein 7.2 6.4 - 8.3 g/dL    Globulin NOT REPORTED 1.5 - 3.8 g/dL    Albumin/Globulin Ratio 1.4 1.0 - 2.5   Urinalysis with Microscopic   Result Value Ref Range    Color, UA YELLOW YELLOW    Turbidity UA CLOUDY (A) CLEAR    Glucose, Ur NEGATIVE NEGATIVE    Bilirubin Urine NEGATIVE NEGATIVE    Ketones, Urine NEGATIVE NEGATIVE    Specific Gravity, UA 1.012 1.005 - 1.030    Urine Hgb NEGATIVE NEGATIVE    pH, UA 7.0 5.0 - 8.0    Protein, UA 1+ (A) NEGATIVE    Urobilinogen, Urine Normal Normal    Nitrite, Urine NEGATIVE NEGATIVE    Leukocyte Esterase, Urine TRACE (A) NEGATIVE    -          WBC, UA 10 TO 20 0 - 5 /HPF    RBC, UA 5 TO 10 0 - 4 /HPF    Casts UA  0 - 8 /LPF     5 TO 10 HYALINE Reference range defined for non-centrifuged specimen. Crystals, UA NOT REPORTED None /HPF    Epithelial Cells UA 10 TO 20 0 - 5 /HPF    Renal Epithelial, UA NOT REPORTED 0 /HPF    Bacteria, UA MODERATE (A) None    Mucus, UA NOT REPORTED None    Trichomonas, UA NOT REPORTED None    Amorphous, UA NOT REPORTED None    Other Observations UA NOT REPORTED NOT REQ. Yeast, UA NOT REPORTED None           RADIOLOGY:  No acute cardiopulmonary abnormality    EKG  EKG Interpretation    Interpreted by me    Rhythm: normal sinus   Rate: normal  Axis: normal  Ectopy: none  Conduction: normal  ST Segments: no acute change  T Waves: no acute change  Q Waves: none    Clinical Impression: no acute changes and normal EKG    All EKG's are interpreted by the Emergency Department Physician who either signs or Co-signs this chart in the absence of a cardiologist.    EMERGENCY DEPARTMENT COURSE/IMPRESSION: 64-year-old female with history of hypothyroidism, presented emergency department complaining of worsening generalized swelling over the last 3 weeks, patient did have recent admission approximate 6 months ago for similar symptoms with similar issues, patient was unable to absorb oral Synthroid, was given IV levothyroxine and discharged with endocrine follow-up and more 90 mg daily. Patient states that there was a recall on this medication, patient is alert and oriented, has generalized swelling with tight forearms as well as lower extremities mild swelling of face no airway involvement. Labs were obtained which shows severe hypothyroidism with less than 0.1 free T4 . Patient did have elevated CK myoglobin, was given fluids discussed with internal medicine for admission and IV levothyroxine. PROCEDURES:  None    CONSULTS:  IP CONSULT TO INTERNAL MEDICINE    CRITICAL CARE:  None    FINAL IMPRESSION      1. Muscle soreness    2. Postablative hypothyroidism    3. Elevated CK    4. Myxedema          DISPOSITION / PLAN     DISPOSITION Admitted 07/31/2021 01:14:00 PM      PATIENT REFERRED TO:  No follow-up provider specified.     DISCHARGE MEDICATIONS:  New Prescriptions    No medications on file       Consuelo Mathew DO  Emergency Medicine Resident    (Please note that portions of thisnote were completed with a voice recognition program.  Efforts were made to edit the dictations but occasionally words are mis-transcribed.)     Consuelo Mathew DO  Resident  07/31/21 [address] Alanis DO  Resident  07/31/21 0666

## 2021-08-01 LAB
-: NORMAL
ALLEN TEST: NORMAL
ANION GAP SERPL CALCULATED.3IONS-SCNC: 10 MMOL/L (ref 9–17)
BUN BLDV-MCNC: 9 MG/DL (ref 6–20)
BUN/CREAT BLD: ABNORMAL (ref 9–20)
CALCIUM SERPL-MCNC: 8.4 MG/DL (ref 8.6–10.4)
CARBOXYHEMOGLOBIN: 3.7 % (ref 0–5)
CHLORIDE BLD-SCNC: 100 MMOL/L (ref 98–107)
CO2: 23 MMOL/L (ref 20–31)
CREAT SERPL-MCNC: 0.96 MG/DL (ref 0.5–0.9)
FIO2: NORMAL
GFR AFRICAN AMERICAN: >60 ML/MIN
GFR NON-AFRICAN AMERICAN: >60 ML/MIN
GFR SERPL CREATININE-BSD FRML MDRD: ABNORMAL ML/MIN/{1.73_M2}
GFR SERPL CREATININE-BSD FRML MDRD: ABNORMAL ML/MIN/{1.73_M2}
GLUCOSE BLD-MCNC: 95 MG/DL (ref 70–99)
HCO3 VENOUS: 25.2 MMOL/L (ref 24–30)
HCT VFR BLD CALC: 40.2 % (ref 36.3–47.1)
HEMOGLOBIN: 12.8 G/DL (ref 11.9–15.1)
MAGNESIUM: 2.3 MG/DL (ref 1.6–2.6)
MCH RBC QN AUTO: 29.2 PG (ref 25.2–33.5)
MCHC RBC AUTO-ENTMCNC: 31.8 G/DL (ref 28.4–34.8)
MCV RBC AUTO: 91.8 FL (ref 82.6–102.9)
METHEMOGLOBIN: NORMAL % (ref 0–1.5)
MODE: NORMAL
MYOGLOBIN: 52 NG/ML (ref 25–58)
NEGATIVE BASE EXCESS, VEN: NORMAL MMOL/L (ref 0–2)
NOTIFICATION TIME: NORMAL
NOTIFICATION: NORMAL
NRBC AUTOMATED: 0 PER 100 WBC
O2 DEVICE/FLOW/%: NORMAL
O2 SAT, VEN: 71 % (ref 60–85)
OXYHEMOGLOBIN: NORMAL % (ref 95–98)
PATIENT TEMP: 37
PCO2, VEN, TEMP ADJ: NORMAL MMHG (ref 39–55)
PCO2, VEN: 41.9 (ref 39–55)
PDW BLD-RTO: 14.9 % (ref 11.8–14.4)
PEEP/CPAP: NORMAL
PH VENOUS: 7.4 (ref 7.32–7.42)
PH, VEN, TEMP ADJ: NORMAL (ref 7.32–7.42)
PLATELET # BLD: 266 K/UL (ref 138–453)
PMV BLD AUTO: 11.6 FL (ref 8.1–13.5)
PO2, VEN, TEMP ADJ: NORMAL MMHG (ref 30–50)
PO2, VEN: 38.4 (ref 30–50)
POSITIVE BASE EXCESS, VEN: 0.7 MMOL/L (ref 0–2)
POTASSIUM SERPL-SCNC: 3.5 MMOL/L (ref 3.7–5.3)
PSV: NORMAL
PT. POSITION: NORMAL
RBC # BLD: 4.38 M/UL (ref 3.95–5.11)
REASON FOR REJECTION: NORMAL
RESPIRATORY RATE: NORMAL
SAMPLE SITE: NORMAL
SET RATE: NORMAL
SODIUM BLD-SCNC: 133 MMOL/L (ref 135–144)
TEXT FOR RESPIRATORY: NORMAL
TOTAL CK: 647 U/L (ref 26–192)
TOTAL HB: NORMAL G/DL (ref 12–16)
TOTAL RATE: NORMAL
VT: NORMAL
WBC # BLD: 11.1 K/UL (ref 3.5–11.3)
ZZ NTE CLEAN UP: ORDERED TEST: NORMAL
ZZ NTE WITH NAME CLEAN UP: SPECIMEN SOURCE: NORMAL

## 2021-08-01 PROCEDURE — 2580000003 HC RX 258: Performed by: STUDENT IN AN ORGANIZED HEALTH CARE EDUCATION/TRAINING PROGRAM

## 2021-08-01 PROCEDURE — 80048 BASIC METABOLIC PNL TOTAL CA: CPT

## 2021-08-01 PROCEDURE — 36415 COLL VENOUS BLD VENIPUNCTURE: CPT

## 2021-08-01 PROCEDURE — 6360000002 HC RX W HCPCS: Performed by: STUDENT IN AN ORGANIZED HEALTH CARE EDUCATION/TRAINING PROGRAM

## 2021-08-01 PROCEDURE — 6370000000 HC RX 637 (ALT 250 FOR IP): Performed by: STUDENT IN AN ORGANIZED HEALTH CARE EDUCATION/TRAINING PROGRAM

## 2021-08-01 PROCEDURE — 2060000000 HC ICU INTERMEDIATE R&B

## 2021-08-01 PROCEDURE — 85027 COMPLETE CBC AUTOMATED: CPT

## 2021-08-01 PROCEDURE — 82550 ASSAY OF CK (CPK): CPT

## 2021-08-01 PROCEDURE — 82805 BLOOD GASES W/O2 SATURATION: CPT

## 2021-08-01 PROCEDURE — 83874 ASSAY OF MYOGLOBIN: CPT

## 2021-08-01 PROCEDURE — 2500000003 HC RX 250 WO HCPCS: Performed by: STUDENT IN AN ORGANIZED HEALTH CARE EDUCATION/TRAINING PROGRAM

## 2021-08-01 PROCEDURE — 83735 ASSAY OF MAGNESIUM: CPT

## 2021-08-01 PROCEDURE — 6370000000 HC RX 637 (ALT 250 FOR IP)

## 2021-08-01 RX ORDER — POTASSIUM CHLORIDE 20 MEQ/1
40 TABLET, EXTENDED RELEASE ORAL ONCE
Status: COMPLETED | OUTPATIENT
Start: 2021-08-01 | End: 2021-08-01

## 2021-08-01 RX ORDER — SODIUM CHLORIDE 0.9 % (FLUSH) 0.9 %
5-40 SYRINGE (ML) INJECTION PRN
Status: DISCONTINUED | OUTPATIENT
Start: 2021-08-01 | End: 2021-08-03 | Stop reason: HOSPADM

## 2021-08-01 RX ORDER — SODIUM CHLORIDE 0.9 % (FLUSH) 0.9 %
5-40 SYRINGE (ML) INJECTION EVERY 12 HOURS SCHEDULED
Status: DISCONTINUED | OUTPATIENT
Start: 2021-08-01 | End: 2021-08-03 | Stop reason: HOSPADM

## 2021-08-01 RX ADMIN — LEVOTHYROXINE SODIUM ANHYDROUS 50 MCG: 100 INJECTION, POWDER, LYOPHILIZED, FOR SOLUTION INTRAVENOUS at 09:20

## 2021-08-01 RX ADMIN — HEPARIN SODIUM 5000 UNITS: 5000 INJECTION INTRAVENOUS; SUBCUTANEOUS at 00:24

## 2021-08-01 RX ADMIN — SODIUM CHLORIDE, PRESERVATIVE FREE 10 ML: 5 INJECTION INTRAVENOUS at 21:44

## 2021-08-01 RX ADMIN — POTASSIUM CHLORIDE 40 MEQ: 1500 TABLET, EXTENDED RELEASE ORAL at 17:24

## 2021-08-01 RX ADMIN — SODIUM CHLORIDE: 9 INJECTION, SOLUTION INTRAVENOUS at 00:25

## 2021-08-01 RX ADMIN — SODIUM CHLORIDE, PRESERVATIVE FREE 10 ML: 5 INJECTION INTRAVENOUS at 10:24

## 2021-08-01 RX ADMIN — OXYCODONE HYDROCHLORIDE AND ACETAMINOPHEN 1 TABLET: 5; 325 TABLET ORAL at 13:01

## 2021-08-01 RX ADMIN — OXYCODONE HYDROCHLORIDE AND ACETAMINOPHEN 1 TABLET: 5; 325 TABLET ORAL at 05:27

## 2021-08-01 RX ADMIN — FAMOTIDINE 20 MG: 20 TABLET, FILM COATED ORAL at 10:21

## 2021-08-01 RX ADMIN — FOLIC ACID 1 MG: 1 TABLET ORAL at 10:21

## 2021-08-01 RX ADMIN — SODIUM CHLORIDE 5 ML: 9 INJECTION, SOLUTION INTRAMUSCULAR; INTRAVENOUS; SUBCUTANEOUS at 09:20

## 2021-08-01 RX ADMIN — OXYCODONE HYDROCHLORIDE AND ACETAMINOPHEN 1 TABLET: 5; 325 TABLET ORAL at 21:44

## 2021-08-01 ASSESSMENT — PAIN DESCRIPTION - PAIN TYPE: TYPE: ACUTE PAIN

## 2021-08-01 ASSESSMENT — PAIN SCALES - GENERAL
PAINLEVEL_OUTOF10: 10
PAINLEVEL_OUTOF10: 0
PAINLEVEL_OUTOF10: 8
PAINLEVEL_OUTOF10: 0
PAINLEVEL_OUTOF10: 0
PAINLEVEL_OUTOF10: 7
PAINLEVEL_OUTOF10: 10

## 2021-08-01 NOTE — PROGRESS NOTES
[facility]  Internal Medicine Teaching Residency Program  Inpatient Daily Progress Note  ______________________________________________________________________________    Patient: Constantino Brewster  YOB: 1980   JKV:[id number]    Acct: [de-identified]     Room: 2007/2007-01  Admit date: 7/31/2021  Today's date: 08/01/21  Number of days in the hospital: 1    SUBJECTIVE   Admitting Diagnosis: <principal problem not specified>  CC: Generalized body pain and swelling  Pt examined at bedside. Chart & results reviewed. Still has generalized pain and tense skin on the upper limbs. Started on IV levothyroxine 50mg    ROS:  Constitutional:  negative for chills, fevers, sweats  Respiratory:  negative for cough, dyspnea on exertion, hemoptysis, shortness of breath, wheezing  Cardiovascular:  negative for chest pain, chest pressure/discomfort, lower extremity edema, palpitations  Gastrointestinal:  negative for abdominal pain, constipation, diarrhea, nausea, vomiting  Neurological:  negative for dizziness, headache  BRIEF HISTORY     The patient is a pleasant 41 y.o. female presents with a chief complaint of body pain and swelling. She has a history of hypothyroidism since age 21 and has been on levothyroxine. Her thyroid grew larger over time she developed thyromegaly  And had total thyroidectomy in Feb 2019 for non-toxic multinodular goiter. She continued on levothyroxine until sep 2020 when she had worsening hypothyroidism with generalized swelling and was admitted. Her TSH was 230, T4 0.5. She was treated with IV synthroid with improvements. She was sent home on Amour thyroid 90mg daily. She mentions that she has not been taking the amour thyroid as the drug had been recalled since May 2021. She is due to see her endocrinologist on 28 August 2021 but could not wait due to the pain and swelling.    She has generalized swelling, non pitting, and her skin feels tense since about 3 weeks, also has generalized pain, especially on her lower limbs. Pain prevents her from walking around. She has cold intolerance, asthenia, headaches, generalized polyarthralgia,     OBJECTIVE     Vital Signs:  /83   Pulse 65   Temp 97.9 °F (36.6 °C) (Oral)   Resp 14   Ht 5' 4\" (1.626 m)   Wt 195 lb 12.8 oz (88.8 kg)   LMP 2021   SpO2 96%   BMI 33.61 kg/m²     Temp (24hrs), Av.3 °F (36.8 °C), Min:97.6 °F (36.4 °C), Max:99.3 °F (37.4 °C)    No intake/output data recorded. Physical Exam:  Constitutional: This is a well developed, well nourished, 30-34.9 - Obesity Grade I 41y.o. year old female who is alert, oriented, cooperative and in no apparent distress. Head:normocephalic and atraumatic. EENT:  PERRLA. No conjunctival injections. Septum was midline, mucosa was without erythema, exudates or cobblestoning. No thrush was noted. Neck: Supple without thyromegaly. No elevated JVP. Trachea was midline. Respiratory: Chest was symmetrical without dullness to percussion. Breath sounds bilaterally were clear to auscultation. There were no wheezes, rhonchi or rales. There is no intercostal retraction or use of accessory muscles. No egophony noted. Cardiovascular: Regular without murmur, clicks, gallops or rubs. Abdomen: Slightly rounded and soft without organomegaly. No rebound, rigidity or guarding was appreciated. Lymphatic: No lymphadenopathy. Musculoskeletal: Normal curvature of the spine. No gross muscle weakness. Extremities:  No lower extremity edema, ulcerations, tenderness, varicosities or erythema. Muscle size, tone and strength are normal.  No involuntary movements are noted. Skin:  Warm and dry. Good color, turgor and pigmentation. No lesions or scars.   No cyanosis or clubbing  Neurological/Psychiatric: The patient's general behavior, level of consciousness, thought content and emotional status is normal.        Medications:  Scheduled Medications:    sodium chloride flush  5-40 mL Intravenous 2 times per day    famotidine  20 mg Oral BID    levothyroxine  50 mcg Intravenous Daily    And    sodium chloride (PF)  5 mL Intravenous Daily    cholestyramine light  4 g Oral BID    folic acid  1 mg Oral Daily    heparin (porcine)  5,000 Units Subcutaneous 3 times per day     Continuous Infusions:    sodium chloride      sodium chloride 75 mL/hr at 08/01/21 0025     PRN Medicationssodium chloride flush, 5-40 mL, PRN  sodium chloride, 25 mL, PRN  ondansetron, 4 mg, Q8H PRN   Or  ondansetron, 4 mg, Q6H PRN  polyethylene glycol, 17 g, Daily PRN  acetaminophen, 650 mg, Q6H PRN   Or  acetaminophen, 650 mg, Q6H PRN  traMADol, 25 mg, Q6H PRN  oxyCODONE-acetaminophen, 1 tablet, Q4H PRN        Diagnostic Labs:  CBC:   Recent Labs     07/31/21  1155 08/01/21  0340   WBC 12.4* 11.1   RBC 4.59 4.38   HGB 13.4 12.8   HCT 41.5 40.2   MCV 90.4 91.8   RDW 14.8* 14.9*    266     BMP:   Recent Labs     07/31/21  1155 08/01/21  0340    133*   K 3.7 3.5*   CL 98 100   CO2 25 23   BUN 8 9   CREATININE 1.14* 0.96*     BNP: No results for input(s): BNP in the last 72 hours. PT/INR: No results for input(s): PROTIME, INR in the last 72 hours. APTT: No results for input(s): APTT in the last 72 hours. CARDIAC ENZYMES: No results for input(s): CKMB, CKMBINDEX, TROPONINI in the last 72 hours.     Invalid input(s): CKTOTAL;3  FASTING LIPID PANEL:No results found for: CHOL, HDL, TRIG  LIVER PROFILE:   Recent Labs     07/31/21  1155   AST 28   ALT 20   BILIDIR 0.09   BILITOT 0.19*   ALKPHOS 74      MICROBIOLOGY:   Lab Results   Component Value Date/Time    CULTURE NO SIGNIFICANT GROWTH 01/13/2012 01:21 AM    CULTURE  01/13/2012 01:21 AM     Performed at [address] [place] [address] [phone]       Imaging:    XR CHEST PORTABLE    Result Date: 7/31/2021  Negative chest.       ASSESSMENT & PLAN     ASSESSMENT / PLAN:     Principal Problem:    Hypothyroidism due to medication non compliance. Stopped taking meds since May 2021, cold intolerance, generalized body pain, arthralgias, myalgia and non pitting edema. Elevated CK, .49, T4 <0.10. Previously taking 90mg of amour thyroid daily  Plan:     IV levothyroxine 50mg daily               Anticipate switch to amour thyroid 90 mg following 3 days of IV       treatment. IVF 75ml/hour              Trend TSH, T4     Active problems:  Acute Kidney Injury: Cr 1.14-->0.96, due to rhabdomyopathy. Total -->647, Myoglobin 132-->52. Plan:    IVF 75ml/hour              Daily BMP,               Trend CK, Myoglobin levels     Hypothyroid myopathy: Myalgia, Total -->647, Myoglobin 132-->52. Plan:    Hydration IVF 75ml/hour              Trend CK and Myoglobin     Hypertension: B.P 133/83. Probably due to pain and hypothyroidism  Plan: Monitor BP     Hx Total thyroidectomy     UTI/cystitis; urine leucocyte esterase, moderate bacteremia,         DVT ppx: Heparin  GI ppx: Pepsid     PT/OT/SW: consulted  Discharge Planning: Case management    Newton Fortune MD  Internal Medicine Resident, PGY-1  [facility];  [place], [place]  8/1/2021, 10:20 AM

## 2021-08-01 NOTE — CARE COORDINATION
Case Management Initial Discharge Plan  Cherrie Maki,             Met with:patient to discuss discharge plans. Information verified: address, contacts, phone number, , insurance Yes  Insurance Provider: [address]    Emergency Contact/Next of Kin name & number: doc Olmos [phone]  Who are involved in patient's support system?  and family    PCP: Patrick Tolentino MD  Date of last visit: 4 months ago      Discharge Planning    Living Arrangements:  Spouse/Significant Other, Children     Home has one story  2 stairs to climb to get into front door    Patient able to perform ADL's:Independent    Current Services (outpatient & in home) none  DME equipment: none  DME provider: n/a    Is patient receiving oral anticoagulation therapy? No      Potential Assistance Needed:  N/A    Patient agreeable to home care: No  [place] of choice provided:  n/a    Prior SNF/Rehab Placement and Facility: N/A  Agreeable to SNF/Rehab: No  [place] of choice provided: n/a     Evaluation: n/a    Expected Discharge date:  21    Patient expects to be discharged to: If home: is the family and/or caregiver willing & able to provide support at home? yes  Who will be providing this support? family*    Follow Up Appointment: Best Day/ Time:      Transportation provider:   Transportation arrangements needed for discharge: No    Readmission Risk              Risk of Unplanned Readmission:  10             Does patient have a readmission risk score greater than 14?: No  If yes, follow-up appointment must be made within 7 days of discharge. Goals of Care: decreased swelling      Educated patient on transitional options, provided freedom of choice and are agreeable with plan      Discharge Plan: home with her family    Pharmacy-[facility] on [address]          Electronically signed by Carolyn Vo RN on 21 at 3:51 PM EDT

## 2021-08-01 NOTE — ED NOTES
Pt c/o migraine and having pain. Admitting team paged and notified. Requesting medication orders.       Concepcion Gerardo RN  07/31/21 1788

## 2021-08-02 LAB
ANION GAP SERPL CALCULATED.3IONS-SCNC: 13 MMOL/L (ref 9–17)
BUN BLDV-MCNC: 7 MG/DL (ref 6–20)
BUN/CREAT BLD: ABNORMAL (ref 9–20)
CALCIUM SERPL-MCNC: 8.2 MG/DL (ref 8.6–10.4)
CHLORIDE BLD-SCNC: 101 MMOL/L (ref 98–107)
CO2: 21 MMOL/L (ref 20–31)
CREAT SERPL-MCNC: 0.96 MG/DL (ref 0.5–0.9)
EKG ATRIAL RATE: 69 BPM
EKG P AXIS: 62 DEGREES
EKG P-R INTERVAL: 152 MS
EKG Q-T INTERVAL: 368 MS
EKG QRS DURATION: 90 MS
EKG QTC CALCULATION (BAZETT): 394 MS
EKG R AXIS: 62 DEGREES
EKG T AXIS: 39 DEGREES
EKG VENTRICULAR RATE: 69 BPM
GFR AFRICAN AMERICAN: >60 ML/MIN
GFR NON-AFRICAN AMERICAN: >60 ML/MIN
GFR SERPL CREATININE-BSD FRML MDRD: ABNORMAL ML/MIN/{1.73_M2}
GFR SERPL CREATININE-BSD FRML MDRD: ABNORMAL ML/MIN/{1.73_M2}
GLUCOSE BLD-MCNC: 109 MG/DL (ref 70–99)
HCT VFR BLD CALC: 40 % (ref 36.3–47.1)
HEMOGLOBIN: 12.9 G/DL (ref 11.9–15.1)
MCH RBC QN AUTO: 29.4 PG (ref 25.2–33.5)
MCHC RBC AUTO-ENTMCNC: 32.3 G/DL (ref 28.4–34.8)
MCV RBC AUTO: 91.1 FL (ref 82.6–102.9)
NRBC AUTOMATED: 0 PER 100 WBC
PDW BLD-RTO: 14.7 % (ref 11.8–14.4)
PLATELET # BLD: 267 K/UL (ref 138–453)
PMV BLD AUTO: 11.9 FL (ref 8.1–13.5)
POTASSIUM SERPL-SCNC: 3.6 MMOL/L (ref 3.7–5.3)
RBC # BLD: 4.39 M/UL (ref 3.95–5.11)
SODIUM BLD-SCNC: 135 MMOL/L (ref 135–144)
THYROXINE, FREE: 0.18 NG/DL (ref 0.93–1.7)
TSH SERPL DL<=0.05 MIU/L-ACNC: 250.32 MIU/L (ref 0.3–5)
WBC # BLD: 10.1 K/UL (ref 3.5–11.3)

## 2021-08-02 PROCEDURE — 99232 SBSQ HOSP IP/OBS MODERATE 35: CPT | Performed by: INTERNAL MEDICINE

## 2021-08-02 PROCEDURE — 2060000000 HC ICU INTERMEDIATE R&B

## 2021-08-02 PROCEDURE — 93010 ELECTROCARDIOGRAM REPORT: CPT | Performed by: INTERNAL MEDICINE

## 2021-08-02 PROCEDURE — 80048 BASIC METABOLIC PNL TOTAL CA: CPT

## 2021-08-02 PROCEDURE — 2500000003 HC RX 250 WO HCPCS: Performed by: STUDENT IN AN ORGANIZED HEALTH CARE EDUCATION/TRAINING PROGRAM

## 2021-08-02 PROCEDURE — 6370000000 HC RX 637 (ALT 250 FOR IP): Performed by: STUDENT IN AN ORGANIZED HEALTH CARE EDUCATION/TRAINING PROGRAM

## 2021-08-02 PROCEDURE — 2580000003 HC RX 258: Performed by: STUDENT IN AN ORGANIZED HEALTH CARE EDUCATION/TRAINING PROGRAM

## 2021-08-02 PROCEDURE — 6370000000 HC RX 637 (ALT 250 FOR IP)

## 2021-08-02 PROCEDURE — 85027 COMPLETE CBC AUTOMATED: CPT

## 2021-08-02 PROCEDURE — 36415 COLL VENOUS BLD VENIPUNCTURE: CPT

## 2021-08-02 PROCEDURE — 84443 ASSAY THYROID STIM HORMONE: CPT

## 2021-08-02 PROCEDURE — 84439 ASSAY OF FREE THYROXINE: CPT

## 2021-08-02 RX ORDER — POTASSIUM CHLORIDE 20 MEQ/1
40 TABLET, EXTENDED RELEASE ORAL ONCE
Status: COMPLETED | OUTPATIENT
Start: 2021-08-02 | End: 2021-08-02

## 2021-08-02 RX ORDER — LEVOTHYROXINE AND LIOTHYRONINE 57; 13.5 UG/1; UG/1
90 TABLET ORAL DAILY
Qty: 60 TABLET | Refills: 2 | Status: SHIPPED | OUTPATIENT
Start: 2021-08-02 | End: 2022-01-15

## 2021-08-02 RX ADMIN — FOLIC ACID 1 MG: 1 TABLET ORAL at 09:17

## 2021-08-02 RX ADMIN — SODIUM CHLORIDE: 9 INJECTION, SOLUTION INTRAVENOUS at 06:21

## 2021-08-02 RX ADMIN — SODIUM CHLORIDE: 9 INJECTION, SOLUTION INTRAVENOUS at 21:10

## 2021-08-02 RX ADMIN — OXYCODONE HYDROCHLORIDE AND ACETAMINOPHEN 1 TABLET: 5; 325 TABLET ORAL at 18:52

## 2021-08-02 RX ADMIN — FAMOTIDINE 20 MG: 20 TABLET, FILM COATED ORAL at 21:48

## 2021-08-02 RX ADMIN — POTASSIUM CHLORIDE 40 MEQ: 1500 TABLET, EXTENDED RELEASE ORAL at 11:28

## 2021-08-02 RX ADMIN — SODIUM CHLORIDE 5 ML: 9 INJECTION, SOLUTION INTRAMUSCULAR; INTRAVENOUS; SUBCUTANEOUS at 09:19

## 2021-08-02 RX ADMIN — ACETAMINOPHEN 650 MG: 325 TABLET ORAL at 11:32

## 2021-08-02 RX ADMIN — FAMOTIDINE 20 MG: 20 TABLET, FILM COATED ORAL at 09:17

## 2021-08-02 RX ADMIN — LEVOTHYROXINE SODIUM ANHYDROUS 50 MCG: 100 INJECTION, POWDER, LYOPHILIZED, FOR SOLUTION INTRAVENOUS at 09:18

## 2021-08-02 RX ADMIN — TRAMADOL HYDROCHLORIDE 25 MG: 50 TABLET, FILM COATED ORAL at 14:04

## 2021-08-02 RX ADMIN — OXYCODONE HYDROCHLORIDE AND ACETAMINOPHEN 1 TABLET: 5; 325 TABLET ORAL at 23:46

## 2021-08-02 RX ADMIN — SODIUM CHLORIDE, PRESERVATIVE FREE 10 ML: 5 INJECTION INTRAVENOUS at 09:17

## 2021-08-02 ASSESSMENT — PAIN SCALES - GENERAL
PAINLEVEL_OUTOF10: 3
PAINLEVEL_OUTOF10: 7
PAINLEVEL_OUTOF10: 4
PAINLEVEL_OUTOF10: 8
PAINLEVEL_OUTOF10: 8

## 2021-08-02 NOTE — PROGRESS NOTES
Physical Therapy        Physical Therapy Cancel Note      DATE: 2021    NAME: Branden Plascencia  MRN: [id number]   : 1980      Patient not seen this date for Physical Therapy due to:    Patient independent with functional mobility. Will defer PT evaluation at this time. Please reorder PT if future needs arise.        Electronically signed by Shahnaz Llamas PT on 2021 at 11:45 AM

## 2021-08-02 NOTE — PROGRESS NOTES
[facility]  Internal Medicine Teaching Residency Program  Inpatient Daily Progress Note  ______________________________________________________________________________    Patient: Aleksandra Holden  YOB: 1980   [id number]    Acct: [de-identified]     Room: 2007/2007-01  Admit date: 7/31/2021  Today's date: 08/02/21  Number of days in the hospital: 2    SUBJECTIVE   Admitting Diagnosis: Hypothyroid myopathy  CC: generalized body swelling and pains    Pt examined at bedside. No acute issues overnight. Pt feels lot better today, facial puffiness and swelling of the arms reduced  Chart & results reviewed. Patient is eating ok, sleeping ok, normal bowel/ bladder movements. Patient is able to ambulate. Patient on IV levothyroxine  Patient on room air      ROS:  Constitutional:  negative for chills, fevers, sweats  Respiratory:  negative for cough, dyspnea on exertion, hemoptysis, shortness of breath, wheezing  Cardiovascular:  negative for chest pain, chest pressure/discomfort, lower extremity edema, palpitations  Gastrointestinal:  negative for abdominal pain, constipation, diarrhea, nausea, vomiting  Neurological:  negative for dizziness, headache    BRIEF HISTORY     The patient is a pleasant 40 y. o. female presents with a chief complaint of body pain and swelling. She has a history of hypothyroidism since age 21 and has been on levothyroxine. Her thyroid grew larger over time she developed thyromegaly  And had total thyroidectomy in Feb 2019 for non-toxic multinodular goiter. She continued on levothyroxine until sep 2020 when she had worsening hypothyroidism with generalized swelling and was admitted. Her TSH was 230, T4 0.5. She was treated with IV synthroid with improvements. She was sent home on Amour thyroid 90mg daily. She mentions that she has not been taking the amour thyroid as the drug had been recalled since May 2021.  She is due to see her endocrinologist on 2021 but could not wait due to the pain and swelling. She has generalized swelling, non pitting, and her skin feels tense since about 3 weeks, also has generalized pain, especially on her lower limbs. Pain prevents her from walking around. She has cold intolerance, asthenia, headaches, generalized polyarthralgia,     OBJECTIVE     Vital Signs:  BP (!) 141/95   Pulse 69   Temp 98.4 °F (36.9 °C) (Oral)   Resp 16   Ht 5' 4\" (1.626 m)   Wt 191 lb 11.2 oz (87 kg)   LMP 2021   SpO2 97%   BMI 32.91 kg/m²     Temp (24hrs), Av.4 °F (36.9 °C), Min:97.9 °F (36.6 °C), Max:98.9 °F (37.2 °C)    In: 3241.3   Out: -     Physical Exam:  Physical Exam  Constitutional:       Appearance: She is obese. HENT:      Head: Normocephalic and atraumatic. Nose: Nose normal.      Mouth/Throat:      Mouth: Mucous membranes are moist.   Eyes:      Extraocular Movements: Extraocular movements intact. Cardiovascular:      Rate and Rhythm: Normal rate and regular rhythm. Pulses: Normal pulses. Heart sounds: Normal heart sounds. No murmur heard. Pulmonary:      Effort: No respiratory distress. Breath sounds: Normal breath sounds. No wheezing or rales. Abdominal:      General: Bowel sounds are normal. There is no distension. Palpations: Abdomen is soft. Tenderness: There is no abdominal tenderness. Musculoskeletal:         General: Swelling present. Cervical back: Normal range of motion. No rigidity. Right lower leg: Edema present. Left lower leg: Edema present. Comments: Upper extremity swelling B/L, reduced since yesterday  Lower extremity edema improving   Skin:     General: Skin is warm. Coloration: Skin is not jaundiced. Findings: No bruising or rash. Neurological:      General: No focal deficit present. Mental Status: She is alert and oriented to person, place, and time. Mental status is at baseline.    Psychiatric: Mood and Affect: Mood normal.         Behavior: Behavior normal.           Medications:  Scheduled Medications:    potassium bicarb-citric acid  40 mEq Oral Daily    sodium chloride flush  5-40 mL Intravenous 2 times per day    famotidine  20 mg Oral BID    levothyroxine  50 mcg Intravenous Daily    And    sodium chloride (PF)  5 mL Intravenous Daily    cholestyramine light  4 g Oral BID    folic acid  1 mg Oral Daily    heparin (porcine)  5,000 Units Subcutaneous 3 times per day     Continuous Infusions:    sodium chloride      sodium chloride 75 mL/hr at 08/02/21 0621     PRN Medicationssodium chloride flush, 5-40 mL, PRN  sodium chloride, 25 mL, PRN  ondansetron, 4 mg, Q8H PRN   Or  ondansetron, 4 mg, Q6H PRN  polyethylene glycol, 17 g, Daily PRN  acetaminophen, 650 mg, Q6H PRN   Or  acetaminophen, 650 mg, Q6H PRN  traMADol, 25 mg, Q6H PRN  oxyCODONE-acetaminophen, 1 tablet, Q4H PRN        Diagnostic Labs:  CBC:   Recent Labs     07/31/21  1155 08/01/21  0340 08/02/21  0426   WBC 12.4* 11.1 10.1   RBC 4.59 4.38 4.39   HGB 13.4 12.8 12.9   HCT 41.5 40.2 40.0   MCV 90.4 91.8 91.1   RDW 14.8* 14.9* 14.7*    266 267     BMP:   Recent Labs     07/31/21  1155 08/01/21  0340 08/02/21  0426    133* 135   K 3.7 3.5* 3.6*   CL 98 100 101   CO2 25 23 21   BUN 8 9 7   CREATININE 1.14* 0.96* 0.96*     BNP: No results for input(s): BNP in the last 72 hours. PT/INR: No results for input(s): PROTIME, INR in the last 72 hours. APTT: No results for input(s): APTT in the last 72 hours. CARDIAC ENZYMES: No results for input(s): CKMB, CKMBINDEX, TROPONINI in the last 72 hours.     Invalid input(s): CKTOTAL;3  FASTING LIPID PANEL:No results found for: CHOL, HDL, TRIG  LIVER PROFILE:   Recent Labs     07/31/21  1155   AST 28   ALT 20   BILIDIR 0.09   BILITOT 0.19*   ALKPHOS 74      MICROBIOLOGY:   Lab Results   Component Value Date/Time    CULTURE NO SIGNIFICANT GROWTH 01/13/2012 01:21 AM    CULTURE 01/13/2012 01:21 AM     Performed at [facility] 3 [phone]       Imaging:    XR CHEST PORTABLE    Result Date: 7/31/2021  Negative chest.       ASSESSMENT & PLAN     Assessment and Plan:    Principal Problem:    Hypothyroid myopathy  Active Problems:    Elevated blood pressure reading    H/O total thyroidectomy    Non-traumatic rhabdomyolysis    Hypothyroidism    GATO (acute kidney injury) ([facility] [address]  Resolved Problems:    * No resolved hospital problems. *    1. Hypothyroidism - due to medication noncompliance         Continue IV levothyroxine         Possible switch to oral amour thyroid tomorrow         Repeat TSH still high           2. GATO - improved          BUN - 7 and creatinine 1 today, improved    3. Hypothyroid myopathy -          CK and myoglobin trending down         Continue hydration    4. Elevated blood pressure -           likely due to hypothyroidism          Monitor blood pressure    Diet: regular adult diet  DVT ppx : heparin  GI ppx: pepcid    PT/OT: consulted  Discharge Planning / SW: possible discharge tomorrow,, home independently    Will Allen  PGY-1  Internal Medicine  [facility]   7:46 AM 8/2/2021   Attending Physician Statement  I have discussed the care of Elder Mcnair, including pertinent history and exam findings,  with the resident. I have seen and examined the patient and the key elements of all parts of the encounter have been performed by me. I agree with the assessment, plan and orders as documented by the resident with additions . Treatment plan Discussed with nursing staff in detail , all questions answered . Electronically signed by Shahida Persaud MD on   8/2/21 at 4:18 PM EDT    Please note that this chart was generated using voice recognition Dragon dictation software.   Although every effort was made to ensure the accuracy of this automated transcription, some errors in transcription may have occurred.

## 2021-08-02 NOTE — PROGRESS NOTES
[facility])  Occupational Therapy Not Seen Note    DATE: 2021    NAME: Marie Vigil  MRN: [id number]   : 1980      Patient not seen this date for Occupational Therapy due to:    Patient independent with ADLs and functional tasks with no acute OT needs. Will defer OT evaluation at this time. Please reorder OT if future needs arise. RN reports pt has been ambulating independently off unit. Writer spoke with pt who confirms independence with ADLs and functional mobility; no concerns reported at this time. Pt encouraged to notify RN or MD if concerns arise throughout hospitalization.      Electronically signed by ZENIA Barrett on 2021 at 11:57 AM

## 2021-08-02 NOTE — FLOWSHEET NOTE
SPIRITUAL CARE PROGRESS NOTE     Spiritual Assessment: Pt was calm and approachable, sitting up in bed upon  arrival.     Intervention: Pancho Miranda facilitated pt exploration of feelings, thoughts, and concerns.  discussed with pt her belief system, and offered prayer.  Outcome: Pt politely declined prayer, but expressed gratitude for visit. 08/02/21 0924   Encounter Summary   Services provided to: Patient   [facility]   Support System Spouse; Family members   Place of [facility]   (none)   Continue Visiting   (8/2/21)   Complexity of Encounter Low   Length of Encounter 15 minutes   Spiritual Assessment Completed Yes   Routine   Type Initial   Assessment Calm; Approachable   Intervention Explored feelings, thoughts, concerns; Discussed belief system/Gnosticism practices/timothy   Outcome Expressed gratitude

## 2021-08-03 VITALS
WEIGHT: 191.7 LBS | OXYGEN SATURATION: 97 % | DIASTOLIC BLOOD PRESSURE: 97 MMHG | RESPIRATION RATE: 14 BRPM | BODY MASS INDEX: 32.73 KG/M2 | TEMPERATURE: 98.1 F | HEIGHT: 64 IN | HEART RATE: 81 BPM | SYSTOLIC BLOOD PRESSURE: 131 MMHG

## 2021-08-03 LAB
ANION GAP SERPL CALCULATED.3IONS-SCNC: 14 MMOL/L (ref 9–17)
BUN BLDV-MCNC: 6 MG/DL (ref 6–20)
BUN/CREAT BLD: ABNORMAL (ref 9–20)
CALCIUM SERPL-MCNC: 8.6 MG/DL (ref 8.6–10.4)
CHLORIDE BLD-SCNC: 102 MMOL/L (ref 98–107)
CO2: 20 MMOL/L (ref 20–31)
CREAT SERPL-MCNC: 1.04 MG/DL (ref 0.5–0.9)
GFR AFRICAN AMERICAN: >60 ML/MIN
GFR NON-AFRICAN AMERICAN: 58 ML/MIN
GFR SERPL CREATININE-BSD FRML MDRD: ABNORMAL ML/MIN/{1.73_M2}
GFR SERPL CREATININE-BSD FRML MDRD: ABNORMAL ML/MIN/{1.73_M2}
GLUCOSE BLD-MCNC: 96 MG/DL (ref 70–99)
HCT VFR BLD CALC: 40.5 % (ref 36.3–47.1)
HEMOGLOBIN: 13.3 G/DL (ref 11.9–15.1)
MCH RBC QN AUTO: 29.4 PG (ref 25.2–33.5)
MCHC RBC AUTO-ENTMCNC: 32.8 G/DL (ref 28.4–34.8)
MCV RBC AUTO: 89.4 FL (ref 82.6–102.9)
NRBC AUTOMATED: 0 PER 100 WBC
PDW BLD-RTO: 15 % (ref 11.8–14.4)
PLATELET # BLD: 290 K/UL (ref 138–453)
PMV BLD AUTO: 11.6 FL (ref 8.1–13.5)
POTASSIUM SERPL-SCNC: 3.7 MMOL/L (ref 3.7–5.3)
RBC # BLD: 4.53 M/UL (ref 3.95–5.11)
SODIUM BLD-SCNC: 136 MMOL/L (ref 135–144)
WBC # BLD: 12 K/UL (ref 3.5–11.3)

## 2021-08-03 PROCEDURE — 85027 COMPLETE CBC AUTOMATED: CPT

## 2021-08-03 PROCEDURE — 2580000003 HC RX 258: Performed by: STUDENT IN AN ORGANIZED HEALTH CARE EDUCATION/TRAINING PROGRAM

## 2021-08-03 PROCEDURE — 36415 COLL VENOUS BLD VENIPUNCTURE: CPT

## 2021-08-03 PROCEDURE — 80048 BASIC METABOLIC PNL TOTAL CA: CPT

## 2021-08-03 PROCEDURE — 2500000003 HC RX 250 WO HCPCS: Performed by: STUDENT IN AN ORGANIZED HEALTH CARE EDUCATION/TRAINING PROGRAM

## 2021-08-03 PROCEDURE — 6370000000 HC RX 637 (ALT 250 FOR IP): Performed by: STUDENT IN AN ORGANIZED HEALTH CARE EDUCATION/TRAINING PROGRAM

## 2021-08-03 PROCEDURE — 99232 SBSQ HOSP IP/OBS MODERATE 35: CPT | Performed by: INTERNAL MEDICINE

## 2021-08-03 RX ADMIN — SODIUM CHLORIDE 5 ML: 9 INJECTION, SOLUTION INTRAMUSCULAR; INTRAVENOUS; SUBCUTANEOUS at 09:23

## 2021-08-03 RX ADMIN — FAMOTIDINE 20 MG: 20 TABLET, FILM COATED ORAL at 09:17

## 2021-08-03 RX ADMIN — SODIUM CHLORIDE, PRESERVATIVE FREE 10 ML: 5 INJECTION INTRAVENOUS at 09:29

## 2021-08-03 RX ADMIN — FOLIC ACID 1 MG: 1 TABLET ORAL at 09:17

## 2021-08-03 RX ADMIN — LEVOTHYROXINE SODIUM ANHYDROUS 50 MCG: 100 INJECTION, POWDER, LYOPHILIZED, FOR SOLUTION INTRAVENOUS at 09:17

## 2021-08-03 NOTE — CARE COORDINATION
Discharge Report    Met with patient at bedside; she will be driving herself home and has good support from her  at home. She denies any HC or DME needs.      [facility])  Clinical Case Management Department  Written by: Katelyn Starkey RN    Patient Name: Yareli Denzel  Attending Provider: Denise Helm MD  Admit Date: 2021 11:33 AM  MRN: [id number]  Account: [de-identified]                     : 1980  Discharge Date: 8/3      Disposition: home independently      Katelyn Starkey RN

## 2021-08-03 NOTE — PROGRESS NOTES
Physician Progress Note      PATIENT:               Africa Crawford  CSN #:                  [id number]  :                       1980  ADMIT DATE:       2021 11:33 AM  DISCH DATE:        8/3/2021 11:31 AM  RESPONDING  PROVIDER #:        Hari Salcido [address] [facility] TEXT:    Patient admitted with Hypothyroidism. Noted documentation of Acute Kidney   Injury in H&P on , and PN on   . In order to support the diagnosis of   GATO, please include additional clinical indicators in your documentation. Or   please document if the diagnosis of GATO has been ruled out after further study    The medical record reflects the following:  Risk Factors: rhabdomyolysis  Clinical Indicators: creatinine- 1.14-0.96, BUN- 9-6, GFRNAA 58->60  Treatment: IV- NS @ 75 ml/hr, monitor labs,    Defined by Kidney Disease Improving Global Outcomes (KDIGO) clinical practice   guideline for acute kidney injury:  -Increase in SCr by greater than or equal to 0.3 mg/dl within 48 hours; or  -Increase or decrease in SCr to greater than or equal to 1.5 times baseline,   which is known or presumed to have occurred within the prior 7 days; or  -Urine volume < 0.5ml/kg/h for 6 hours    Thank You, Kimberley Bell RN, CDS-  email - [email or web address]  office- [phone]  Options provided:  -- Acute kidney injury evidenced by, Please document evidence as well as   baseline creatinine, if known. -- Currently resolved acute kidney injury was evidenced by, Please document   evidence as well as baseline creatinine, if known.   -- Acute kidney injury ruled out after study  -- Other - I will add my own diagnosis  -- Disagree - Not applicable / Not valid  -- Disagree - Clinically unable to determine / Unknown  -- Refer to Clinical Documentation Reviewer    PROVIDER RESPONSE TEXT:    Now resolved acute kidney injury was evidenced by BUN and creatinine within   normal limits    Query created by: Marky Gutierrez on 8/3/2021 7:10 AM      Electronically signed by:  Alysia Singh 8/3/2021 1:42 PM

## 2021-08-03 NOTE — PROGRESS NOTES
[facility]  Internal Medicine Teaching Residency Program  Inpatient Daily Progress Note  ______________________________________________________________________________    Patient: Garett Kim  YOB: 1980   [id number]    Acct: [de-identified]     Room: 2007/2007-01  Admit date: 7/31/2021  Today's date: 08/03/21  Number of days in the hospital: 3    SUBJECTIVE   Admitting Diagnosis: Hypothyroid myopathy  CC: generalized swelling and weakness    Pt examined at bedside. No acute issues overnight. Pt feeling lot better, swelling of face, arms and legs reduced. Chart & results reviewed. Patient is eating ok, sleeping ok, normal bowel/ bladder movements. Patient is able to ambulate. Patient on room air      ROS:  Constitutional:  negative for chills, fevers, sweats  Respiratory:  negative for cough, dyspnea on exertion, hemoptysis, shortness of breath, wheezing  Cardiovascular:  negative for chest pain, chest pressure/discomfort, lower extremity edema, palpitations  Gastrointestinal:  negative for abdominal pain, constipation, diarrhea, nausea, vomiting  Neurological:  negative for dizziness, headache    BRIEF HISTORY     The patient is a pleasant 40 y. o. female presents with a chief complaint of body pain and swelling. She has a history of hypothyroidism since age 21 and has been on levothyroxine. Her thyroid grew larger over time she developed thyromegaly  And had total thyroidectomy in Feb 2019 for non-toxic multinodular goiter. She continued on levothyroxine until sep 2020 when she had worsening hypothyroidism with generalized swelling and was admitted. Her TSH was 230, T4 0.5. She was treated with IV synthroid with improvements. She was sent home on Amour thyroid 90mg daily. She mentions that she has not been taking the amour thyroid as the drug had been recalled since May 2021.  She is due to see her endocrinologist on 28 August 2021 but could not wait due to the pain and swelling. She has generalized swelling, non pitting, and her skin feels tense since about 3 weeks, also has generalized pain, especially on her lower limbs. Pain prevents her from walking around. She has cold intolerance, asthenia, headaches, generalized polyarthralgia,     OBJECTIVE     Vital Signs:  BP (!) 131/97   Pulse 81   Temp 98.1 °F (36.7 °C) (Oral)   Resp 14   Ht 5' 4\" (1.626 m)   Wt 191 lb 11.2 oz (87 kg)   LMP 2021   SpO2 97%   BMI 32.91 kg/m²     Temp (24hrs), Av.6 °F (37 °C), Min:97.9 °F (36.6 °C), Max:99.1 °F (37.3 °C)    No intake/output data recorded. Physical Exam:  Physical Exam  Constitutional:       Appearance: She is obese. HENT:      Head: Normocephalic and atraumatic. Nose: Nose normal.      Mouth/Throat:      Mouth: Mucous membranes are moist.   Eyes:      Extraocular Movements: Extraocular movements intact. Cardiovascular:      Rate and Rhythm: Normal rate and regular rhythm. Pulses: Normal pulses. Heart sounds: Normal heart sounds. No murmur heard. Pulmonary:      Effort: No respiratory distress. Breath sounds: Normal breath sounds. No wheezing or rales. Abdominal:      General: Bowel sounds are normal. There is no distension. Palpations: Abdomen is soft. Tenderness: There is no abdominal tenderness. Musculoskeletal:         General: Swelling present. Normal range of motion. Cervical back: Normal range of motion. No rigidity. Right lower leg: Edema present. Left lower leg: Edema present. Comments: Slight swelling of upper and lower extremities present, but decreased when compared to yesterday   Skin:     General: Skin is warm. Coloration: Skin is not jaundiced. Findings: No bruising or rash. Neurological:      General: No focal deficit present. Mental Status: She is alert and oriented to person, place, and time. Mental status is at baseline.    Psychiatric: Mood and Affect: Mood normal.         Behavior: Behavior normal.           Medications:  Scheduled Medications:   Continuous Infusions:   PRN Medications    Diagnostic Labs:  CBC:   Recent Labs     08/01/21 0340 08/02/21 0426 08/03/21 0415   WBC 11.1 10.1 12.0*   RBC 4.38 4.39 4.53   HGB 12.8 12.9 13.3   HCT 40.2 40.0 40.5   MCV 91.8 91.1 89.4   RDW 14.9* 14.7* 15.0*    267 290     BMP:   Recent Labs     08/01/21 0340 08/02/21  0426 08/03/21 0415   * 135 136   K 3.5* 3.6* 3.7    101 102   CO2 23 21 20   BUN 9 7 6   CREATININE 0.96* 0.96* 1.04*     BNP: No results for input(s): BNP in the last 72 hours. PT/INR: No results for input(s): PROTIME, INR in the last 72 hours. APTT: No results for input(s): APTT in the last 72 hours. CARDIAC ENZYMES: No results for input(s): CKMB, CKMBINDEX, TROPONINI in the last 72 hours. Invalid input(s): CKTOTAL;3  FASTING LIPID PANEL:No results found for: CHOL, HDL, TRIG  LIVER PROFILE: No results for input(s): AST, ALT, ALB, BILIDIR, BILITOT, ALKPHOS in the last 72 hours. MICROBIOLOGY:   Lab Results   Component Value Date/Time    CULTURE NO SIGNIFICANT GROWTH 01/13/2012 01:21 AM    CULTURE  01/13/2012 01:21 AM     Performed at [address] 3 [phone]       Imaging:    XR CHEST PORTABLE    Result Date: 7/31/2021  Negative chest.       ASSESSMENT & PLAN     Assessment and Plan:    Principal Problem:    Hypothyroid myopathy  Active Problems:    Elevated blood pressure reading    H/O total thyroidectomy    Non-traumatic rhabdomyolysis    Hypothyroidism    GATO (acute kidney injury) ([facility] [address]  Resolved Problems:    * No resolved hospital problems. *    1. Hypothyroidism - due to medication noncompliance         Continue IV levothyroxine         switch to oral amour thyroid today - discharge today         Repeat TSH still high         Follow up outpt           2.  GATO - improved          BUN - 6 and creatinine 1 today, improved     3.  Hypothyroid myopathy -          CK and myoglobin trending down         Continue hydration     4. Elevated blood pressure -           likely due to hypothyroidism          Monitor blood pressure    Diet: regular adult diet  DVT ppx : heparin  GI ppx: pepcid    PT/OT: onboard  Discharge Planning / Radu : discharge today, going home independently      [address] [id number]  PGY-1  Internal Medicine  [facility], [address]    1:35 [place] 8/3/2021

## 2021-08-03 NOTE — DISCHARGE INSTR - COC
Continuity of Care Form    Patient Name: Kenna Matos   :  1980  MRN:  [id number]    Admit date:  2021  Discharge date:  ***    Code Status Order: Full Code   Advance Directives:     Admitting Physician:  Cindy Silva MD  PCP: Sayda Santos MD    Discharging Nurse: [facility] Unit/Room#:   Discharging Unit Phone Number: ***    Emergency Contact:   Extended Emergency Contact Information  Primary Emergency Contact: Rolan Holman [address] Phone: [phone]  Relation: Spouse    Past Surgical History:  Past Surgical History:   Procedure Laterality Date    CHOLECYSTECTOMY      DILATION AND CURETTAGE OF UTERUS      DILATION AND CURETTAGE OF UTERUS  2017    LEEP      SD INDUCED ABORTN BY DIL/EVAC N/A 2017    DILATATION AND CURETTAGE SUCTION performed by Tolu Garner DO at [address] [place] [address],[address]         Immunization History:   Immunization History   Administered Date(s) Administered    Influenza Virus Vaccine 10/01/2017    Influenza, Quadv, IM, PF (6 mo and older Fluzone, Flulaval, Fluarix, and 3 yrs and older Afluria) 2020       Active Problems:  Patient Active Problem List   Diagnosis Code    Severe hypothyroidism E03.8    Elevated blood pressure reading R03.0    Dysphonia R49.0    Hypophosphatemia E83.39    Obesity (BMI 30-39. 9) E66.9    H/O total thyroidectomy E89.0    Hypothyroid myopathy E03.9, G73.7    H/O: hypothyroidism Z86.39    Carpal tunnel syndrome on right G56.01    Non-traumatic rhabdomyolysis M62.82    Elevated CPK R74.8    Elevated myoglobin level R89.7    Folate deficiency E53.8    Hypothyroidism E03.9    GATO (acute kidney injury) ([facility] [address] N17.9       Isolation/Infection:   Isolation          No Isolation        Patient Infection Status     Infection Onset Added Last Indicated Last Indicated By Review Planned Expiration Resolved Resolved By    None active    Resolved C-diff Rule Out 20 C DIFF TOXIN/ANTIGEN (Ordered)   21 Harshil Adamson RN          Nurse Assessment:  Last Vital Signs: BP (!) 131/97   Pulse 81   Temp 98.1 °F (36.7 °C) (Oral)   Resp 14   Ht 5' 4\" (1.626 m)   Wt 191 lb 11.2 oz (87 kg)   LMP 2021   SpO2 97%   BMI 32.91 kg/m²     Last documented pain score (0-10 scale): Pain Level: 7  Last Weight:   Wt Readings from Last 1 Encounters:   21 191 lb 11.2 oz (87 kg)     Mental Status:  {IP PT MENTAL STATUS:}    IV Access:  { QUINCY IV ACCESS:449875378}    Nursing Mobility/ADLs:  Walking   {P DME VFEF:433612485}  Transfer  {P DME OOTU:072314652}  Bathing  {P DME PRIK:865951160}  Dressing  {P DME HLAU:722756987}  Toileting  {P DME GZHZ:191715440}  Feeding  {P DME JQYK:424645672}  Med Admin  {[facility] DME VVS}  Med Delivery   { QUINCY MED Delivery:159455629}    Wound Care Documentation and Therapy:        Elimination:  Continence:   · Bowel: {YES / BC:28733}  · Bladder: {YES / HL:48329}  Urinary Catheter: {Urinary Catheter:512660258}   Colostomy/Ileostomy/Ileal Conduit: {YES / ML:68419}       Date of Last BM: ***    Intake/Output Summary (Last 24 hours) at 8/3/2021 0844  Last data filed at 2021 1300  Gross per 24 hour   Intake 200 ml   Output --   Net 200 ml     I/O last 3 completed shifts:   In: 400 [P.O.:400]  Out: -     Safety Concerns:     [address] Safety Concerns:385329751}    Impairments/Disabilities:      [address] Impairments/Disabilities:929008138}    Nutrition Therapy:  Current Nutrition Therapy:   [address] Diet List:704401546}    Routes of Feeding: {P DME Other Feedings:106922193}  Liquids: {Slp liquid thickness:80532}  Daily Fluid Restriction: {CHP DME Yes amt example:313278350}  Last Modified Barium Swallow with Video (Video Swallowing Test): {Done Not Done NHYN:147208076}    Treatments at the Time of Hospital Discharge:   Respiratory Treatments: ***  Oxygen Therapy:  {Therapy; copd oxygen:57975}  Ventilator:    {[facility] Vent UEOO:404814303}    Rehab Therapies: {THERAPEUTIC INTERVENTION:0103875386}  Weight Bearing Status/Restrictions: {[facility] Weight Bearin}  Other Medical Equipment (for information only, NOT a DME order):  {EQUIPMENT:526495868}  Other Treatments: ***    Patient's personal belongings (please select all that are sent with patient):  {[facility] DME Belongings:071212653}    RN SIGNATURE:  {Esignature:399273127}    CASE MANAGEMENT/SOCIAL WORK SECTION    Inpatient Status Date: ***    Readmission Risk Assessment Score:  Readmission Risk              Risk of Unplanned Readmission:  9           Discharging to Facility/ Agency   · Name:   · Address:  · Phone:  · Fax:    Dialysis Facility (if applicable)   · Name:  · Address:  · Dialysis Schedule:  · Phone:  · Fax:    / signature: {Esignature:605739076}    PHYSICIAN SECTION    Prognosis: {Prognosis:3804040515}    Condition at Discharge: [address] [place] [address] Patient Condition:217923464}    Rehab Potential (if transferring to Rehab): {Prognosis:7079368874}    Recommended Labs or Other Treatments After Discharge: ***    Physician Certification: I certify the above information and transfer of Lesly Vasquez  is necessary for the continuing treatment of the diagnosis listed and that she requires {Admit to Appropriate Level of Care:85277} for {GREATER/LESS:128782945} 30 days.      Update Admission H&P: {CHP DME Changes in AXCA[facility]:738087408}    PHYSICIAN SIGNATURE:  {Esignature:966699911}

## 2021-08-13 NOTE — DISCHARGE SUMMARY
[address]     Department of Internal Medicine - Staff Internal Medicine Teaching Service    INPATIENT DISCHARGE SUMMARY      Patient Identification:  Celeste Hatfield is a 41 y.o. female. :  1980  MRN: [id number]     Acct: [de-identified]   PCP: Dione Jenkins MD  Admit Date:  2021  Discharge date and time: 2021  Attending Provider: Dr Brook Phillips Problem Lists:  Principal Problem:    Hypothyroid myopathy  Active Problems:    Elevated blood pressure reading    H/O total thyroidectomy    Non-traumatic rhabdomyolysis    Hypothyroidism    GATO (acute kidney injury) ([address]    Muscle soreness  Resolved Problems:    * No resolved hospital problems. *      HOSPITAL STAY     Brief Inpatient course:   Celeste Hatfield is a 41 y.o. female who was admitted for the management of Hypothyroid myopathy, presented to the emergency department with chief complaints of body pain and swelling. Patient has history of hypothyroidism since age of 21 and has been on levothyroxine. She developed thyromegaly and had total thyroidectomy in  for nontoxic multinodular goiter. She continued on levothyroxine until 2020 when she had worsening hypothyroidism with generalized swelling and got admitted. It was found that her oral levothyroxine is not being up resolved, she was treated with IV levothyroxine in the hospital which made her symptoms better and TSH levels better. And she  was started on Amour Thyroid, which she stopped taking due to insurance issues. Patient has generalized swelling nonpitting edema. Her skin feels tense. Patient was treated with IV levothyroxine, patient symptoms improved. Patient was discharged on Harrison Thyroid. Patient was instructed to follow-up with her endocrinologist.      Procedures/ Significant Interventions:      No results found.       Consults: Consults:     Final Specialist Recommendations/Findings:   IP CONSULT TO INTERNAL MEDICINE  IP CONSULT TO CASE MANAGEMENT Follow up out pt     Any Hospital Acquired Infections: none    Discharge Functional Status:  stable    DISCHARGE PLAN     Disposition: home    Patient Instructions:   Discharge Medication List as of 8/3/2021 10:54 AM      START taking these medications    Details   !! thyroid (ARMOUR THYROID) 90 MG tablet Take 1 tablet by mouth daily, Disp-60 tablet, R-2Normal       !! - Potential duplicate medications found. Please discuss with provider. CONTINUE these medications which have NOT CHANGED    Details   ibuprofen (ADVIL;MOTRIN) 800 MG tablet Take 1 tablet by mouth every 8 hours as needed for Pain, Disp-30 tablet,R-0Normal      !! thyroid (ARMOUR) 90 MG tablet Take 1 tablet by mouth daily, Disp-30 tablet,R-3Normal      cholestyramine light 4 g packet Take 1 packet by mouth 2 times daily for 7 days, Disp-14 packet,R-0Normal      famotidine (PEPCID) 20 MG tablet Take 1 tablet by mouth 2 times daily for 14 days, Disp-28 [id number]      folic acid (FOLVITE) 1 MG tablet Take 1 tablet by mouth daily, Disp-30 tablet,R-0Normal       !! - Potential duplicate medications found. Please discuss with provider. Activity: activity as tolerated    Diet: regular diet    Follow-up:    Sadi Perez MD  [address] [place] [address]  [phone]    Schedule an appointment as soon as possible for a visit in 1 week  Blood pressure managment     Delma Domínguez MD  [address] [place] [address]            Patient Instructions:   1. Please schedule an appointment for blood pressure management, consistently high blood pressure.    2. Follow up with Endocrinologist for hypothyroidism    Note that over 30 minutes was spent in preparing discharge papers, discussing discharge with patient, medication review, etc.    Haleigh Sears  PGY-1  Internal [facility], [address] [place] [address] Dr   11:53 [place] 8/13/2021

## 2021-09-17 ENCOUNTER — APPOINTMENT (OUTPATIENT)
Dept: GENERAL RADIOLOGY | Age: 41
End: 2021-09-17
Payer: COMMERCIAL

## 2021-09-17 ENCOUNTER — HOSPITAL ENCOUNTER (EMERGENCY)
Age: 41
Discharge: HOME | End: 2021-09-17
Attending: EMERGENCY MEDICINE
Payer: COMMERCIAL

## 2021-09-17 VITALS
HEART RATE: 103 BPM | OXYGEN SATURATION: 98 % | SYSTOLIC BLOOD PRESSURE: 135 MMHG | DIASTOLIC BLOOD PRESSURE: 73 MMHG | TEMPERATURE: 99 F | RESPIRATION RATE: 18 BRPM | BODY MASS INDEX: 31.58 KG/M2 | HEIGHT: 64 IN | WEIGHT: 185 LBS

## 2021-09-17 DIAGNOSIS — J06.9 UPPER RESPIRATORY TRACT INFECTION, UNSPECIFIED TYPE: Primary | ICD-10-CM

## 2021-09-17 LAB
ABSOLUTE EOS #: 0.03 K/UL (ref 0–0.44)
ABSOLUTE IMMATURE GRANULOCYTE: <0.03 K/UL (ref 0–0.3)
ABSOLUTE LYMPH #: 1.71 K/UL (ref 1.1–3.7)
ABSOLUTE MONO #: 0.52 K/UL (ref 0.1–1.2)
ANION GAP SERPL CALCULATED.3IONS-SCNC: 13 MMOL/L (ref 9–17)
BASOPHILS # BLD: 1 % (ref 0–2)
BASOPHILS ABSOLUTE: 0.04 K/UL (ref 0–0.2)
BUN BLDV-MCNC: 8 MG/DL (ref 6–20)
BUN/CREAT BLD: ABNORMAL (ref 9–20)
CALCIUM SERPL-MCNC: 8.6 MG/DL (ref 8.6–10.4)
CHLORIDE BLD-SCNC: 103 MMOL/L (ref 98–107)
CO2: 18 MMOL/L (ref 20–31)
CREAT SERPL-MCNC: 0.74 MG/DL (ref 0.5–0.9)
DIFFERENTIAL TYPE: NORMAL
EOSINOPHILS RELATIVE PERCENT: 1 % (ref 1–4)
GFR AFRICAN AMERICAN: >60 ML/MIN
GFR NON-AFRICAN AMERICAN: >60 ML/MIN
GFR SERPL CREATININE-BSD FRML MDRD: ABNORMAL ML/MIN/{1.73_M2}
GFR SERPL CREATININE-BSD FRML MDRD: ABNORMAL ML/MIN/{1.73_M2}
GLUCOSE BLD-MCNC: 188 MG/DL (ref 70–99)
HCT VFR BLD CALC: 38.8 % (ref 36.3–47.1)
HEMOGLOBIN: 12.4 G/DL (ref 11.9–15.1)
IMMATURE GRANULOCYTES: 0 %
LYMPHOCYTES # BLD: 30 % (ref 24–43)
MAGNESIUM: 2 MG/DL (ref 1.6–2.6)
MCH RBC QN AUTO: 29.3 PG (ref 25.2–33.5)
MCHC RBC AUTO-ENTMCNC: 32 G/DL (ref 28.4–34.8)
MCV RBC AUTO: 91.7 FL (ref 82.6–102.9)
MONOCYTES # BLD: 9 % (ref 3–12)
NRBC AUTOMATED: 0 PER 100 WBC
PDW BLD-RTO: 13.4 % (ref 11.8–14.4)
PLATELET # BLD: 205 K/UL (ref 138–453)
PLATELET ESTIMATE: NORMAL
PMV BLD AUTO: 11.6 FL (ref 8.1–13.5)
POTASSIUM SERPL-SCNC: 3.4 MMOL/L (ref 3.7–5.3)
RBC # BLD: 4.23 M/UL (ref 3.95–5.11)
RBC # BLD: NORMAL 10*6/UL
SARS-COV-2: NORMAL
SARS-COV-2: NOT DETECTED
SEG NEUTROPHILS: 59 % (ref 36–65)
SEGMENTED NEUTROPHILS ABSOLUTE COUNT: 3.37 K/UL (ref 1.5–8.1)
SODIUM BLD-SCNC: 134 MMOL/L (ref 135–144)
SOURCE: NORMAL
WBC # BLD: 5.7 K/UL (ref 3.5–11.3)
WBC # BLD: NORMAL 10*3/UL

## 2021-09-17 PROCEDURE — 6370000000 HC RX 637 (ALT 250 FOR IP): Performed by: STUDENT IN AN ORGANIZED HEALTH CARE EDUCATION/TRAINING PROGRAM

## 2021-09-17 PROCEDURE — U0003 INFECTIOUS AGENT DETECTION BY NUCLEIC ACID (DNA OR RNA); SEVERE ACUTE RESPIRATORY SYNDROME CORONAVIRUS 2 (SARS-COV-2) (CORONAVIRUS DISEASE [COVID-19]), AMPLIFIED PROBE TECHNIQUE, MAKING USE OF HIGH THROUGHPUT TECHNOLOGIES AS DESCRIBED BY CMS-2020-01-R: HCPCS

## 2021-09-17 PROCEDURE — 96374 THER/PROPH/DIAG INJ IV PUSH: CPT

## 2021-09-17 PROCEDURE — 71045 X-RAY EXAM CHEST 1 VIEW: CPT

## 2021-09-17 PROCEDURE — 96372 THER/PROPH/DIAG INJ SC/IM: CPT

## 2021-09-17 PROCEDURE — 83735 ASSAY OF MAGNESIUM: CPT

## 2021-09-17 PROCEDURE — 80048 BASIC METABOLIC PNL TOTAL CA: CPT

## 2021-09-17 PROCEDURE — 99284 EMERGENCY DEPT VISIT MOD MDM: CPT

## 2021-09-17 PROCEDURE — 85025 COMPLETE CBC W/AUTO DIFF WBC: CPT

## 2021-09-17 PROCEDURE — U0005 INFEC AGEN DETEC AMPLI PROBE: HCPCS

## 2021-09-17 PROCEDURE — 6360000002 HC RX W HCPCS: Performed by: STUDENT IN AN ORGANIZED HEALTH CARE EDUCATION/TRAINING PROGRAM

## 2021-09-17 PROCEDURE — 96375 TX/PRO/DX INJ NEW DRUG ADDON: CPT

## 2021-09-17 RX ORDER — KETOROLAC TROMETHAMINE 15 MG/ML
15 INJECTION, SOLUTION INTRAMUSCULAR; INTRAVENOUS ONCE
Status: COMPLETED | OUTPATIENT
Start: 2021-09-17 | End: 2021-09-17

## 2021-09-17 RX ORDER — POTASSIUM CHLORIDE 20 MEQ/1
10 TABLET, EXTENDED RELEASE ORAL ONCE
Status: COMPLETED | OUTPATIENT
Start: 2021-09-17 | End: 2021-09-17

## 2021-09-17 RX ORDER — IBUPROFEN 600 MG/1
600 TABLET ORAL EVERY 8 HOURS PRN
Qty: 21 TABLET | Refills: 0 | Status: ON HOLD | OUTPATIENT
Start: 2021-09-17 | End: 2022-07-16 | Stop reason: HOSPADM

## 2021-09-17 RX ORDER — PROCHLORPERAZINE EDISYLATE 5 MG/ML
10 INJECTION INTRAMUSCULAR; INTRAVENOUS ONCE
Status: COMPLETED | OUTPATIENT
Start: 2021-09-17 | End: 2021-09-17

## 2021-09-17 RX ORDER — DIPHENHYDRAMINE HYDROCHLORIDE 50 MG/ML
25 INJECTION INTRAMUSCULAR; INTRAVENOUS ONCE
Status: COMPLETED | OUTPATIENT
Start: 2021-09-17 | End: 2021-09-17

## 2021-09-17 RX ADMIN — KETOROLAC TROMETHAMINE 15 MG: 15 INJECTION, SOLUTION INTRAMUSCULAR; INTRAVENOUS at 02:05

## 2021-09-17 RX ADMIN — DIPHENHYDRAMINE HYDROCHLORIDE 25 MG: 50 INJECTION INTRAMUSCULAR; INTRAVENOUS at 02:06

## 2021-09-17 RX ADMIN — POTASSIUM CHLORIDE 10 MEQ: 1500 TABLET, EXTENDED RELEASE ORAL at 03:30

## 2021-09-17 RX ADMIN — PROCHLORPERAZINE EDISYLATE 10 MG: 5 INJECTION INTRAMUSCULAR; INTRAVENOUS at 02:05

## 2021-09-17 ASSESSMENT — PAIN SCALES - GENERAL
PAINLEVEL_OUTOF10: 10
PAINLEVEL_OUTOF10: 10

## 2021-09-17 ASSESSMENT — PAIN DESCRIPTION - PAIN TYPE: TYPE: ACUTE PAIN

## 2021-09-17 ASSESSMENT — PAIN DESCRIPTION - LOCATION: LOCATION: HEAD

## 2021-09-17 NOTE — ED PROVIDER NOTES
Faculty Sign-Out Attestation  Handoff taken on the following patient from prior Attending Physician: Randall Carranza was available and discussed any additional care issues that arose and coordinated the management plans with the resident(s) caring for the patient during my duty period. Any areas of disagreement with residents documentation of care or procedures are noted on the chart. I was personally present for the key portions of any/all procedures during my duty period. I have documented in the chart those procedures where I was not present during the key portions.     Concern for covid,   cxr pending,   Basic lab,   Treating symptoms, will plan to discharge    Greg Burton DO  Attending Physician     Greg Burton,   09/17/21 0158    cxr stable, lab stable, discharged per plan     Greg Burton,   09/17/21 9373

## 2021-09-17 NOTE — ED PROVIDER NOTES
[facility] ED  Emergency Department Encounter  EmergencyMedicine Resident     Pt Name:Cherrie Virk  MRN: [id number]  [place] 1980  Date of evaluation: 9/17/21  PCP:  Edgar Gill MD    This patient was evaluated in the Emergency Department for symptoms described in the history of present illness. The patient was evaluated in the context of the global COVID-19 pandemic, which necessitated consideration that the patient might be at risk for infection with the SARS-CoV-2 virus that causes COVID-19. Institutional protocols and algorithms that pertain to the evaluation of patients at risk for COVID-19 are in a state of rapid change based on information released by regulatory bodies including the CDC and federal and state organizations. These policies and algorithms were followed during the patient's care in the ED. CHIEF COMPLAINT       Chief Complaint   Patient presents with    Concern For COVID-19     cough, can't smell       HISTORY OF PRESENT ILLNESS  (Location/Symptom, Timing/Onset, Context/Setting, Quality, Duration, Modifying Factors, Severity.)      Cherrie Virk is a 41 y.o. female who presents with headache, productive cough, shortness of breath, chills and loss of smell x3 days. Patient states that 3 days ago, she began experiencing a cough that is alternately productive of white and greenish sputum and is accompanied by feeling of shortness of breath. Patient does not have any history of difficulty breathing, no history of asthma. She took Mucinex at home, and attempted to take her son's albuterol inhaler despite not having a history of asthma herself. She states these medications did not produce any symptom improvement. She also endorses photophobia that began concurrently with a moderate diffuse headache. Patient is unvaccinated for Covid, but is concerned she may have it.  She does not know of any positive exposure, but states someone at her son's school was sick and she is not sure what they had. Patient denies fevers at home, changes in vision, LOC, head trauma, chest pain, abdominal pain, N/V/D, dizziness, lightheadedness, history of DVT or PE. Denies any chance she might be pregnant. Allergies to doxycycline and cefaclor. PAST MEDICAL / SURGICAL / SOCIAL / FAMILY HISTORY      has a past medical history of Carpal tunnel syndrome, Enlarged thyroid, H/O D&C (2001), H/O LEEP (1997), H/O SAB x 4, Incomplete miscarriage, Miscarriage, Psychiatric problem, Thyroid disease, and Thyroiditis. has a past surgical history that includes LEEP; Cholecystectomy; Dilation and curettage of uterus; Dilation and curettage of uterus (06/11/2017); pr induced abortn by dil/evac (N/A, 6/11/2017); and Thyroidectomy. Social History     Socioeconomic History    Marital status:      Spouse name: Not on file    Number of children: Not on file    Years of education: Not on file    Highest education level: Not on file   Occupational History    Not on file   Tobacco Use    Smoking status: Current Every Day Smoker     Packs/day: 0.50     Years: 23.00     Pack years: 11.50     Types: Cigarettes    Smokeless tobacco: Never Used   Substance and Sexual Activity    Alcohol use: No    Drug use: No    Sexual activity: Not on file   Other Topics Concern    Not on file   Social History Narrative    Not on file     Social Determinants of Health     Financial Resource Strain:     Difficulty of Paying Living Expenses:    Food Insecurity:     Worried About Running Out of Food in the Last Year:     Ran Out of Food in the Last Year:    Transportation Needs:     Lack of Transportation (Medical):      Lack of Transportation (Non-Medical):    Physical Activity:     Days of Exercise per Week:     Minutes of Exercise per Session:    Stress:     Feeling of Stress :    Social Connections:     Frequency of Communication with Friends and Family:     Frequency of Social Gatherings with Friends and Family:     Attends [employer]ism Services:     Active Member of Clubs or Organizations:     Attends Club or Organization Meetings:     Marital Status:    Intimate Partner Violence:     Fear of Current or Ex-Partner:     Emotionally Abused:     Physically Abused:     Sexually Abused:        Family History   Problem Relation Age of Onset    Diabetes Mother     Mental Illness Mother     Substance Abuse Father        Allergies:  Doxycycline and Cefaclor    Home Medications:  Prior to Admission medications    Medication Sig Start Date End Date Taking? Authorizing Provider   ibuprofen (ADVIL;MOTRIN) 600 MG tablet Take 1 tablet by mouth every 8 hours as needed for Pain 9/17/21 9/24/21 Yes Marci Forte MD   thyroid [facility] THYROID) 90 MG tablet Take 1 tablet by mouth daily 8/2/21   Mable Beauchamp MD   thyroid (ARMOUR) 90 MG tablet Take 1 tablet by mouth daily 9/8/20   Mimi Troy MD   cholestyramine light 4 g packet Take 1 packet by mouth 2 times daily for 7 days 9/8/20 9/15/20  Mimi Troy MD   famotidine (PEPCID) 20 MG tablet Take 1 tablet by mouth 2 times daily for 14 days 9/8/20 9/22/20  Mimi Troy MD   folic acid (FOLVITE) 1 MG tablet Take 1 tablet by mouth daily 9/8/20 12/7/20  Mimi Troy MD       REVIEW OF SYSTEMS    (2-9 systems for level 4, 10 or more for level 5)      Review of Systems   Constitutional: Positive for chills, fatigue and fever (Fevers at home per patient). HENT: Negative for congestion, rhinorrhea and sore throat. Eyes: Positive for photophobia. Negative for pain, discharge and visual disturbance. Respiratory: Positive for cough (Cough productive of clear sputum) and shortness of breath. Negative for wheezing. Cardiovascular: Negative for chest pain and palpitations. Gastrointestinal: Negative for abdominal pain, diarrhea, nausea and vomiting. Genitourinary: Negative for dysuria. Musculoskeletal: Positive for myalgias. Negative for back pain, gait problem, neck pain and neck stiffness. Skin: Negative for pallor, rash and wound. Neurological: Positive for headaches. Negative for dizziness, seizures, syncope, weakness, light-headedness and numbness. PHYSICAL EXAM   (up to 7 for level 4, 8 or more for level 5)      INITIAL VITALS:   /73   Pulse 103   Temp 99 °F (37.2 °C) (Oral)   Resp 18   Ht 5' 4\" (1.626 m)   Wt 185 lb (83.9 kg)   SpO2 98%   BMI 31.76 kg/m²     Physical Exam  Vitals reviewed. Constitutional:       General: She is not in acute distress. Appearance: She is ill-appearing. HENT:      Head: Normocephalic and atraumatic. Right Ear: Tympanic membrane normal.      Left Ear: Tympanic membrane normal.      Nose: Nose normal.      Mouth/Throat:      Mouth: Mucous membranes are moist.      Pharynx: Oropharynx is clear. Eyes:      Extraocular Movements: Extraocular movements intact. Conjunctiva/sclera: Conjunctivae normal.      Pupils: Pupils are equal, round, and reactive to light. Cardiovascular:      Rate and Rhythm: Regular rhythm. Tachycardia present. Pulses: Normal pulses. Heart sounds: Normal heart sounds. Pulmonary:      Effort: Pulmonary effort is normal.      Breath sounds: Normal breath sounds. No stridor. No wheezing, rhonchi or rales. Abdominal:      Palpations: Abdomen is soft. Tenderness: There is no abdominal tenderness. There is no right CVA tenderness, left CVA tenderness, guarding or rebound. Musculoskeletal:         General: Normal range of motion. Cervical back: Normal range of motion and neck supple. Skin:     Capillary Refill: Capillary refill takes less than 2 seconds. Coloration: Skin is not pale. Findings: No rash. Neurological:      General: No focal deficit present. Mental Status: She is alert and oriented to person, place, and time. Sensory: No sensory deficit. Motor: No weakness.          DIFFERENTIAL DIAGNOSIS     PLAN (LABS / IMAGING / EKG):  Orders Placed This Encounter   Procedures    XR CHEST PORTABLE    CBC Auto Differential    Basic Metabolic Panel w/ Reflex to MG    COVID-19    Magnesium    Vital signs       MEDICATIONS ORDERED:  Orders Placed This Encounter   Medications    ketorolac (TORADOL) injection 15 mg    prochlorperazine (COMPAZINE) injection 10 mg    diphenhydrAMINE (BENADRYL) injection 25 mg    potassium chloride (KLOR-CON M) extended release tablet 10 mEq    ibuprofen (ADVIL;MOTRIN) 600 MG tablet     Sig: Take 1 tablet by mouth every 8 hours as needed for Pain     Dispense:  21 tablet     Refill:  0     DDX: Covid-19 vs viral URI vs pneumonia vs bronchitis vs migraine vs non-intractable headache    DIAGNOSTIC RESULTS / EMERGENCY DEPARTMENT COURSE / MDM   LAB RESULTS:  Results for orders placed or performed during the hospital encounter of 09/17/21   CBC Auto Differential   Result Value Ref Range    WBC 5.7 3.5 - 11.3 k/uL    RBC 4.23 3.95 - 5.11 m/uL    Hemoglobin 12.4 11.9 - 15.1 g/dL    Hematocrit 38.8 36.3 - 47.1 %    MCV 91.7 82.6 - 102.9 fL    MCH 29.3 25.2 - 33.5 pg    MCHC 32.0 28.4 - 34.8 g/dL    RDW 13.4 11.8 - 14.4 %    Platelets 199 244 - 483 k/uL    MPV 11.6 8.1 - 13.5 fL    NRBC Automated 0.0 0.0 per 100 WBC    Differential Type NOT REPORTED     Seg Neutrophils 59 36 - 65 %    Lymphocytes 30 24 - 43 %    Monocytes 9 3 - 12 %    Eosinophils % 1 1 - 4 %    Basophils 1 0 - 2 %    Immature Granulocytes 0 0 %    Segs Absolute 3.37 1.50 - 8.10 k/uL    Absolute Lymph # 1.71 1.10 - 3.70 k/uL    Absolute Mono # 0.52 0.10 - 1.20 k/uL    Absolute Eos # 0.03 0.00 - 0.44 k/uL    Basophils Absolute 0.04 0.00 - 0.20 k/uL    Absolute Immature Granulocyte <0.03 0.00 - 0.30 k/uL    WBC Morphology NOT REPORTED     RBC Morphology NOT REPORTED     Platelet Estimate NOT REPORTED    Basic Metabolic Panel w/ Reflex to MG   Result Value Ref Range    Glucose 188 (H) 70 - 99 mg/dL    BUN 8 6 - 20 chest x-ray read, initial reading was sent without narrative and impression, [facility] and radiologist will upload shortly. [JG]   0319 BP decreased to 135/73 after migraine cocktail. CBC unremarkable, BMP showing potassium 3.4, will replace. On reevaluation, SPO2 99% on room air, patient resting with blanket over her eyes. Respirations even and unlabored. States her headache is improved. CXR unremarkable. Patient having a dry cough in the ED but has not produced any sputum. No leukocytosis, SpO2 still 98 on RA without any signs of respiratory distress. Patient stable to be discharged and follow up with Covid results and with PCP outpatient. Discussed return precautions with patient, patient voiced understanding. Discharged with tylenol for headache and detailed instructions for return precautions    [JG]      ED Course User Index  [JG] Abilio Soni MD     PROCEDURES:  None    CONSULTS:  None    CRITICAL CARE:  None    FINAL IMPRESSION      1.  Upper respiratory tract infection, unspecified type          DISPOSITION / PLAN     DISPOSITION Decision To Discharge 09/17/2021 03:27:55 AM    PATIENT REFERRED TO:  Cornelia Temple MD  [address]  [address]  [phone]    Schedule an appointment as soon as possible for a visit today  As needed, If symptoms worsen    [facility]  [address] [place] [address]  [phone]  Go today  If symptoms worsen      DISCHARGE MEDICATIONS:  Discharge Medication List as of 9/17/2021  3:30 AM          Abilio Soni MD  Emergency Medicine Resident    (Please note that portions of thisnote were completed with a voice recognition program.  Efforts were made to edit the dictations but occasionally words are mis-transcribed.)       Abilio Soni MD  Resident  09/17/21 294 [facility] Sharmaine Oneill MD  Resident  10/13/21 2499

## 2021-09-17 NOTE — ED NOTES
Pt resting on stretcher. NAD noted. RR even and nonlabored. Call light within reach. Will continue to monitor.        Nate Lynch RN  09/17/21 [id number]

## 2021-09-17 NOTE — ED NOTES
Pt came to ED with concerns for COVID. Pt stated she has had cough and lost sense of smell on Wednesday. Pt has cough with green sputum production. Pt reports HA, chest congestion, and chest tightness with coughing. Pt does not believe she has been around anyone with COVID. Pt did state someone in her children's school is sick, but unsure with what.       Nate Lynch RN  09/17/21 0220

## 2021-09-17 NOTE — ED PROVIDER NOTES
[facility]     Emergency Department     Faculty Note/ Attestation      Pt Name: Yu Calderon                                       MRN: [id number]  [place] 1980  Date of evaluation: 9/17/2021    Patients PCP:    Corrie Lunsford MD      Attestation  I performed a history and physical examination of the patient and discussed management with the resident. I reviewed the residents note and agree with the documented findings and plan of care. Any areas of disagreement are noted on the chart. I was personally present for the key portions of any procedures. I have documented in the chart those procedures where I was not present during the key portions. I have reviewed the emergency nurses triage note. I agree with the chief complaint, past medical history, past surgical history, allergies, medications, social and family history as documented unless otherwise noted below. For Physician Assistant/ Nurse Practitioner cases/documentation I have personally evaluated this patient and have completed at least one if not all key elements of the E/M (history, physical exam, and MDM). Additional findings are as noted.       Initial Screens:             Vitals:    Vitals:    09/17/21 0125   BP: (!) 163/90   Pulse: 115   Resp: 18   Temp: 99 °F (37.2 °C)   TempSrc: Oral   SpO2: 98%   Weight: 185 lb (83.9 kg)   Height: 5' 4\" (1.626 m)       CHIEF COMPLAINT       Chief Complaint   Patient presents with    Concern For COVID-19     cough, can't smell             DIAGNOSTIC RESULTS             RADIOLOGY:   No orders to display         LABS:  Labs Reviewed - No data to display      EMERGENCY DEPARTMENT COURSE:     -------------------------  BP: (!) 163/90, Temp: 99 °F (37.2 °C), Pulse: 115, Resp: 18      Comments    URI sx, productive cough  Fevers at home  Constipated  Tachy in room  Not vaccinated  Sx c/w COVID, no red flags    Labs, chest x-ray, Covid PCR and have patient follow-up results on MyChart.   Will need to follow-up with her PCP      (Please note that portions of this note were completed with a voice recognition program.  Efforts were made to edit the dictations but occasionally words are mis-transcribed.)      Constance Duverney, MD,, MD  Attending Emergency Physician         Constance Duverney, MD  09/17/21 0145

## 2021-10-13 ASSESSMENT — ENCOUNTER SYMPTOMS
PHOTOPHOBIA: 1
NAUSEA: 0
BACK PAIN: 0
COUGH: 1
SHORTNESS OF BREATH: 1
WHEEZING: 0
SORE THROAT: 0
EYE DISCHARGE: 0
ABDOMINAL PAIN: 0
RHINORRHEA: 0
VOMITING: 0
DIARRHEA: 0
EYE PAIN: 0

## 2022-01-15 ENCOUNTER — HOSPITAL ENCOUNTER (EMERGENCY)
Age: 42
Discharge: HOME | End: 2022-01-15
Attending: EMERGENCY MEDICINE
Payer: COMMERCIAL

## 2022-01-15 VITALS
HEART RATE: 92 BPM | SYSTOLIC BLOOD PRESSURE: 148 MMHG | RESPIRATION RATE: 18 BRPM | TEMPERATURE: 97 F | OXYGEN SATURATION: 98 % | WEIGHT: 190 LBS | HEIGHT: 63 IN | BODY MASS INDEX: 33.66 KG/M2 | DIASTOLIC BLOOD PRESSURE: 93 MMHG

## 2022-01-15 DIAGNOSIS — M79.10 MYALGIA: Primary | ICD-10-CM

## 2022-01-15 DIAGNOSIS — N30.00 ACUTE CYSTITIS WITHOUT HEMATURIA: ICD-10-CM

## 2022-01-15 LAB
-: ABNORMAL
ABSOLUTE EOS #: 0.34 K/UL (ref 0–0.44)
ABSOLUTE IMMATURE GRANULOCYTE: 0.05 K/UL (ref 0–0.3)
ABSOLUTE LYMPH #: 3.82 K/UL (ref 1.1–3.7)
ABSOLUTE MONO #: 0.86 K/UL (ref 0.1–1.2)
ALBUMIN SERPL-MCNC: 4.2 G/DL (ref 3.5–5.2)
ALBUMIN/GLOBULIN RATIO: 1.6 (ref 1–2.5)
ALP BLD-CCNC: 88 U/L (ref 35–104)
ALT SERPL-CCNC: 18 U/L (ref 5–33)
AMORPHOUS: ABNORMAL
ANION GAP SERPL CALCULATED.3IONS-SCNC: 11 MMOL/L (ref 9–17)
AST SERPL-CCNC: 19 U/L
BACTERIA: ABNORMAL
BASOPHILS # BLD: 1 % (ref 0–2)
BASOPHILS ABSOLUTE: 0.12 K/UL (ref 0–0.2)
BILIRUB SERPL-MCNC: <0.1 MG/DL (ref 0.3–1.2)
BILIRUBIN URINE: NEGATIVE
BUN BLDV-MCNC: 8 MG/DL (ref 6–20)
BUN/CREAT BLD: ABNORMAL (ref 9–20)
CALCIUM SERPL-MCNC: 9.2 MG/DL (ref 8.6–10.4)
CASTS UA: ABNORMAL /LPF (ref 0–8)
CHLORIDE BLD-SCNC: 103 MMOL/L (ref 98–107)
CO2: 21 MMOL/L (ref 20–31)
COLOR: YELLOW
COMMENT UA: ABNORMAL
CREAT SERPL-MCNC: 0.62 MG/DL (ref 0.5–0.9)
CRYSTALS, UA: ABNORMAL /HPF
DIFFERENTIAL TYPE: ABNORMAL
EOSINOPHILS RELATIVE PERCENT: 3 % (ref 1–4)
EPITHELIAL CELLS UA: ABNORMAL /HPF (ref 0–5)
GFR AFRICAN AMERICAN: >60 ML/MIN
GFR NON-AFRICAN AMERICAN: >60 ML/MIN
GFR SERPL CREATININE-BSD FRML MDRD: ABNORMAL ML/MIN/{1.73_M2}
GFR SERPL CREATININE-BSD FRML MDRD: ABNORMAL ML/MIN/{1.73_M2}
GLUCOSE BLD-MCNC: 102 MG/DL (ref 70–99)
GLUCOSE URINE: NEGATIVE
HCG(URINE) PREGNANCY TEST: NEGATIVE
HCT VFR BLD CALC: 39.8 % (ref 36.3–47.1)
HEMOGLOBIN: 13.2 G/DL (ref 11.9–15.1)
IMMATURE GRANULOCYTES: 0 %
KETONES, URINE: NEGATIVE
LEUKOCYTE ESTERASE, URINE: ABNORMAL
LYMPHOCYTES # BLD: 30 % (ref 24–43)
MCH RBC QN AUTO: 28.8 PG (ref 25.2–33.5)
MCHC RBC AUTO-ENTMCNC: 33.2 G/DL (ref 28.4–34.8)
MCV RBC AUTO: 86.7 FL (ref 82.6–102.9)
MONOCYTES # BLD: 7 % (ref 3–12)
MUCUS: ABNORMAL
MYOGLOBIN: <21 NG/ML (ref 25–58)
NITRITE, URINE: NEGATIVE
NRBC AUTOMATED: 0 PER 100 WBC
OTHER OBSERVATIONS UA: ABNORMAL
PDW BLD-RTO: 14.1 % (ref 11.8–14.4)
PH UA: 6.5 (ref 5–8)
PLATELET # BLD: 277 K/UL (ref 138–453)
PLATELET ESTIMATE: ABNORMAL
PMV BLD AUTO: 11.3 FL (ref 8.1–13.5)
POTASSIUM SERPL-SCNC: 3.7 MMOL/L (ref 3.7–5.3)
PROTEIN UA: NEGATIVE
RBC # BLD: 4.59 M/UL (ref 3.95–5.11)
RBC # BLD: ABNORMAL 10*6/UL
RBC UA: ABNORMAL /HPF (ref 0–4)
RENAL EPITHELIAL, UA: ABNORMAL /HPF
SARS-COV-2, RAPID: NOT DETECTED
SEG NEUTROPHILS: 59 % (ref 36–65)
SEGMENTED NEUTROPHILS ABSOLUTE COUNT: 7.51 K/UL (ref 1.5–8.1)
SODIUM BLD-SCNC: 135 MMOL/L (ref 135–144)
SPECIFIC GRAVITY UA: 1.01 (ref 1–1.03)
SPECIMEN DESCRIPTION: NORMAL
THYROXINE, FREE: 1.29 NG/DL (ref 0.93–1.7)
TOTAL CK: 61 U/L (ref 26–192)
TOTAL PROTEIN: 6.9 G/DL (ref 6.4–8.3)
TRICHOMONAS: ABNORMAL
TSH SERPL DL<=0.05 MIU/L-ACNC: 0.01 MIU/L (ref 0.3–5)
TURBIDITY: CLEAR
URINE HGB: NEGATIVE
UROBILINOGEN, URINE: NORMAL
WBC # BLD: 12.7 K/UL (ref 3.5–11.3)
WBC # BLD: ABNORMAL 10*3/UL
WBC UA: ABNORMAL /HPF (ref 0–5)
YEAST: ABNORMAL

## 2022-01-15 PROCEDURE — 84443 ASSAY THYROID STIM HORMONE: CPT

## 2022-01-15 PROCEDURE — 2580000003 HC RX 258

## 2022-01-15 PROCEDURE — 81025 URINE PREGNANCY TEST: CPT

## 2022-01-15 PROCEDURE — 85025 COMPLETE CBC W/AUTO DIFF WBC: CPT

## 2022-01-15 PROCEDURE — 96374 THER/PROPH/DIAG INJ IV PUSH: CPT

## 2022-01-15 PROCEDURE — 6370000000 HC RX 637 (ALT 250 FOR IP)

## 2022-01-15 PROCEDURE — 6360000002 HC RX W HCPCS

## 2022-01-15 PROCEDURE — 82550 ASSAY OF CK (CPK): CPT

## 2022-01-15 PROCEDURE — 81001 URINALYSIS AUTO W/SCOPE: CPT

## 2022-01-15 PROCEDURE — 83874 ASSAY OF MYOGLOBIN: CPT

## 2022-01-15 PROCEDURE — 84439 ASSAY OF FREE THYROXINE: CPT

## 2022-01-15 PROCEDURE — 80053 COMPREHEN METABOLIC PANEL: CPT

## 2022-01-15 PROCEDURE — 99284 EMERGENCY DEPT VISIT MOD MDM: CPT

## 2022-01-15 PROCEDURE — 87635 SARS-COV-2 COVID-19 AMP PRB: CPT

## 2022-01-15 PROCEDURE — 96361 HYDRATE IV INFUSION ADD-ON: CPT

## 2022-01-15 RX ORDER — NITROFURANTOIN 25; 75 MG/1; MG/1
100 CAPSULE ORAL 2 TIMES DAILY
Qty: 6 CAPSULE | Refills: 0 | Status: SHIPPED | OUTPATIENT
Start: 2022-01-15 | End: 2022-01-15

## 2022-01-15 RX ORDER — CEPHALEXIN 500 MG/1
500 CAPSULE ORAL 2 TIMES DAILY
Qty: 6 CAPSULE | Refills: 0 | Status: SHIPPED | OUTPATIENT
Start: 2022-01-15 | End: 2022-01-18

## 2022-01-15 RX ORDER — TRAMADOL HYDROCHLORIDE 50 MG/1
50 TABLET ORAL ONCE
Status: COMPLETED | OUTPATIENT
Start: 2022-01-15 | End: 2022-01-15

## 2022-01-15 RX ORDER — KETOROLAC TROMETHAMINE 15 MG/ML
15 INJECTION, SOLUTION INTRAMUSCULAR; INTRAVENOUS ONCE
Status: COMPLETED | OUTPATIENT
Start: 2022-01-15 | End: 2022-01-15

## 2022-01-15 RX ORDER — 0.9 % SODIUM CHLORIDE 0.9 %
1000 INTRAVENOUS SOLUTION INTRAVENOUS ONCE
Status: COMPLETED | OUTPATIENT
Start: 2022-01-15 | End: 2022-01-15

## 2022-01-15 RX ADMIN — SODIUM CHLORIDE 1000 ML: 9 INJECTION, SOLUTION INTRAVENOUS at 18:41

## 2022-01-15 RX ADMIN — TRAMADOL HYDROCHLORIDE 50 MG: 50 TABLET, COATED ORAL at 20:25

## 2022-01-15 RX ADMIN — KETOROLAC TROMETHAMINE 15 MG: 15 INJECTION, SOLUTION INTRAMUSCULAR; INTRAVENOUS at 18:41

## 2022-01-15 ASSESSMENT — ENCOUNTER SYMPTOMS
PHOTOPHOBIA: 1
CHEST TIGHTNESS: 0
COLOR CHANGE: 0
EYE DISCHARGE: 0
NAUSEA: 0
BACK PAIN: 0
TROUBLE SWALLOWING: 0
VOMITING: 0
EYE ITCHING: 0
RHINORRHEA: 0
SINUS PAIN: 0
DIARRHEA: 0
SHORTNESS OF BREATH: 0
COUGH: 0
WHEEZING: 0
SINUS PRESSURE: 0
BLOOD IN STOOL: 0
SORE THROAT: 0

## 2022-01-15 ASSESSMENT — PAIN SCALES - GENERAL
PAINLEVEL_OUTOF10: 9
PAINLEVEL_OUTOF10: 8
PAINLEVEL_OUTOF10: 8

## 2022-01-15 ASSESSMENT — PAIN DESCRIPTION - LOCATION: LOCATION: ARM;LEG

## 2022-01-15 ASSESSMENT — PAIN DESCRIPTION - DESCRIPTORS: DESCRIPTORS: SHARP;SORE

## 2022-01-15 ASSESSMENT — PAIN DESCRIPTION - FREQUENCY: FREQUENCY: CONTINUOUS

## 2022-01-15 ASSESSMENT — PAIN DESCRIPTION - PAIN TYPE: TYPE: ACUTE PAIN

## 2022-01-15 NOTE — ED TRIAGE NOTES
Pt to ED c/o arm pain and bilateral leg and facial swelling since before christmas. Pt states that this happens frequently since her complete thyroidectomy 3 years ago and when it happens she needs her dosage adjusted on her thyroid medication. Pt states that she sees her endocrinologist on Monday but that the pain has been increasing and had to put a wrist brace on her right wrist to help with the pain. Pt denies any airway complaints or difficulty breathing just stating that \"my face is puffy. \"

## 2022-01-15 NOTE — ED PROVIDER NOTES
[facility] ED  Emergency Department Encounter  EmergencyMedicine Resident     Pt Name:Cherrie Chacon Cera  MRN: [id number]  [place] 1980  Date of evaluation: 1/15/22  PCP:  Chika Shaikh MD    This patient was evaluated in the Emergency Department for symptoms described in the history of present illness. The patient was evaluated in the context of the global COVID-19 pandemic, which necessitated consideration that the patient might be at risk for infection with the SARS-CoV-2 virus that causes COVID-19. Institutional protocols and algorithms that pertain to the evaluation of patients at risk for COVID-19 are in a state of rapid change based on information released by regulatory bodies including the CDC and federal and state organizations. These policies and algorithms were followed during the patient's care in the ED. CHIEF COMPLAINT       Chief Complaint   Patient presents with    Arm Pain    Leg Swelling       HISTORY OF PRESENT ILLNESS  (Location/Symptom, Timing/Onset, Context/Setting, Quality, Duration, Modifying Factors, Severity.)      Adonis Gilbert is a 35 y.o. female who presents with myalgias and body pains, and skin feeling tense and swollen on her arms, calves and headache. Pain about 9/10, onset about a month ago right, progressively getting worse, and is unbearable today. she has a history of Hashimoto hypothyroidism since age 21, used to take levothyroxine, and has had progressive increasing her dose. Had thyroidectomy in February 2019. She has similar episodes of body pains, usually relieved by readjustment of her thyroxine dose. She was switched to [place] thyroxine in September 2021, when her TSH did not respond to IV levothyroxine. She follows an endocrinologist Dr. Lor Young and has an appointment scheduled in 2 days; better pain was worse and she could not wait for the appointment. History of carpal tunnel syndrome.      She admits to having muscle weakness, cramps, fatigue. Denies syncope, seizures, neck, back or abdominal pain, nausea vomiting, diarrhea, sensory deficit, paresthesia. PAST MEDICAL / SURGICAL / SOCIAL / FAMILY HISTORY      has a past medical history of Carpal tunnel syndrome, Enlarged thyroid, H/O D&C (2001), H/O LEEP (1997), H/O SAB x 4, Incomplete miscarriage, Miscarriage, Psychiatric problem, Thyroid disease, and Thyroiditis. has a past surgical history that includes LEEP; Cholecystectomy; Dilation and curettage of uterus; Dilation and curettage of uterus (06/11/2017); pr induced abortn by dil/evac (N/A, 6/11/2017); and Thyroidectomy. Social History     Socioeconomic History    Marital status:      Spouse name: Not on file    Number of children: Not on file    Years of education: Not on file    Highest education level: Not on file   Occupational History    Not on file   Tobacco Use    Smoking status: Current Every Day Smoker     Packs/day: 0.50     Years: 23.00     Pack years: 11.50     Types: Cigarettes    Smokeless tobacco: Never Used   Substance and Sexual Activity    Alcohol use: No    Drug use: No    Sexual activity: Not on file   Other Topics Concern    Not on file   Social History Narrative    Not on file     Social Determinants of Health     Financial Resource Strain:     Difficulty of Paying Living Expenses: Not on file   Food Insecurity:     Worried About Running Out of Food in the Last Year: Not on file    [place] of Food in the Last Year: Not on file   Transportation Needs:     Lack of Transportation (Medical): Not on file    Lack of Transportation (Non-Medical):  Not on file   Physical Activity:     Days of Exercise per Week: Not on file    Minutes of Exercise per Session: Not on file   Stress:     Feeling of Stress : Not on file   Social Connections:     Frequency of Communication with Friends and Family: Not on file    Frequency of Social Gatherings with Friends and Family: Not on file    Attends Roman [employer] Services: Not on file    Active Member of Clubs or Organizations: Not on file    Attends Club or Organization Meetings: Not on file    Marital Status: Not on file   Intimate Partner Violence:     Fear of Current or Ex-Partner: Not on file    Emotionally Abused: Not on file    Physically Abused: Not on file    Sexually Abused: Not on file   Housing Stability:     Unable to Pay for Housing in the Last Year: Not on file    Number of [place] in the Last Year: Not on file    Unstable Housing in the Last Year: Not on file       Family History   Problem Relation Age of Onset    Diabetes Mother     Mental Illness Mother     Substance Abuse Father        Allergies:  Doxycycline    Home Medications:  Prior to Admission medications    Medication Sig Start Date End Date Taking? Authorizing Provider   thyroid (ARMOUR THYROID) 180 MG tablet Take 180 mg by mouth daily   Yes Historical Provider, MD   cephALEXin (KEFLEX) 500 MG capsule Take 1 capsule by mouth 2 times daily for 3 days 1/15/22 1/18/22 Yes Adriane Chance MD   ibuprofen (ADVIL;MOTRIN) 600 MG tablet Take 1 tablet by mouth every 8 hours as needed for Pain 9/17/21 1/15/22 Yes Marry Boxer, MD   cholestyramine light 4 g packet Take 1 packet by mouth 2 times daily for 7 days 9/8/20 1/15/22  Saulo Chance MD   famotidine (PEPCID) 20 MG tablet Take 1 tablet by mouth 2 times daily for 14 days 9/8/20 1/15/22  Saulo Chance MD   folic acid (FOLVITE) 1 MG tablet Take 1 tablet by mouth daily 9/8/20 1/15/22  Saulo Chance MD       REVIEW OF SYSTEMS    (2-9 systems for level 4, 10 or more for level 5)      Review of Systems   Constitutional: Positive for activity change and fatigue. Negative for appetite change, chills, fever and unexpected weight change. HENT: Negative for congestion, mouth sores, rhinorrhea, sinus pressure, sinus pain, sore throat and trouble swallowing.     Eyes: Positive for photophobia. Negative for discharge, itching and visual disturbance. Respiratory: Negative for cough, chest tightness, shortness of breath and wheezing. Cardiovascular: Negative for chest pain and palpitations. Gastrointestinal: Negative for blood in stool, diarrhea, nausea and vomiting. Endocrine: Negative for cold intolerance and heat intolerance. Genitourinary: Negative for dysuria and hematuria. Musculoskeletal: Positive for myalgias. Negative for arthralgias, back pain, joint swelling, neck pain and neck stiffness. Skin: Negative for color change, pallor, rash and wound. Neurological: Positive for weakness and headaches. Negative for dizziness, tremors, seizures, syncope, facial asymmetry, speech difficulty, light-headedness and numbness. Psychiatric/Behavioral: Negative for agitation and hallucinations. PHYSICAL EXAM   (up to 7 for level 4, 8 or more for level 5)      INITIAL VITALS:   BP (!) 148/93   Pulse 92   Temp 97 °F (36.1 °C) (Oral)   Resp 18   Ht 5' 3\" (1.6 m)   Wt 190 lb (86.2 kg)   SpO2 98%   BMI 33.66 kg/m²     Physical Exam  Constitutional:       General: She is not in acute distress. Appearance: She is obese. She is not ill-appearing or diaphoretic. HENT:      Head: Normocephalic and atraumatic. Nose: Nose normal.      Mouth/Throat:      Mouth: Mucous membranes are moist.      Pharynx: Oropharynx is clear. Eyes:      Conjunctiva/sclera: Conjunctivae normal.      Pupils: Pupils are equal, round, and reactive to light. Cardiovascular:      Rate and Rhythm: Normal rate and regular rhythm. Pulses: Normal pulses. Heart sounds: Normal heart sounds. Pulmonary:      Effort: Pulmonary effort is normal. No respiratory distress. Breath sounds: Normal breath sounds. Chest:      Chest wall: No tenderness. Abdominal:      General: There is no distension. Tenderness: There is no guarding.    Musculoskeletal:         General: Tenderness present. No swelling. Cervical back: No rigidity or tenderness. Right lower leg: No edema. Left lower leg: No edema. Skin:     Coloration: Skin is not jaundiced. Findings: No erythema, lesion or rash. Neurological:      General: No focal deficit present. Mental Status: She is alert. GCS: GCS eye subscore is 4. GCS verbal subscore is 5. GCS motor subscore is 6. Cranial Nerves: No cranial nerve deficit or facial asymmetry. Sensory: No sensory deficit. Motor: Weakness present. No atrophy. Coordination: Coordination normal.      Deep Tendon Reflexes: Reflexes normal.      Reflex Scores:       Patellar reflexes are 2+ on the right side and 2+ on the left side.      Comments: Right hand squeeze weakness 3/5, 4/5 on left         DIFFERENTIAL  DIAGNOSIS     PLAN (LABS / IMAGING / EKG):  Orders Placed This Encounter   Procedures    COVID-19, Rapid    CBC WITH AUTO DIFFERENTIAL    COMPREHENSIVE METABOLIC PANEL    TSH with Reflex    CK    MYOGLOBIN, SERUM    PREGNANCY, URINE    Urinalysis Reflex to Culture    T4, Free    Microscopic Urinalysis    Remote cardiac monitor       MEDICATIONS ORDERED:  Orders Placed This Encounter   Medications    ketorolac (TORADOL) injection 15 mg    0.9 % sodium chloride bolus    traMADol (ULTRAM) tablet 50 mg    DISCONTD: nitrofurantoin, macrocrystal-monohydrate, (MACROBID) 100 MG capsule     Sig: Take 1 capsule by mouth 2 times daily for 3 days     Dispense:  6 capsule     Refill:  0    cephALEXin (KEFLEX) 500 MG capsule     Sig: Take 1 capsule by mouth 2 times daily for 3 days     Dispense:  6 capsule     Refill:  0       DDX: Hypothyroidism, myopathy, neuropathy, fibromyalgia, hyper/hypocalcemia, electrolyte imbalance      DIAGNOSTIC RESULTS / EMERGENCY DEPARTMENT COURSE / MDM   LAB RESULTS:  Results for orders placed or performed during the hospital encounter of 01/15/22   COVID-19, Rapid    Specimen: Nasopharyngeal Swab Result Value Ref Range    Specimen Description . NASOPHARYNGEAL SWAB     SARS-CoV-2, Rapid Not Detected Not Detected   CBC WITH AUTO DIFFERENTIAL   Result Value Ref Range    WBC 12.7 (H) 3.5 - 11.3 k/uL    RBC 4.59 3.95 - 5.11 m/uL    Hemoglobin 13.2 11.9 - 15.1 g/dL    Hematocrit 39.8 36.3 - 47.1 %    MCV 86.7 82.6 - 102.9 fL    MCH 28.8 25.2 - 33.5 pg    MCHC 33.2 28.4 - 34.8 g/dL    RDW 14.1 11.8 - 14.4 %    Platelets 732 266 - 026 k/uL    MPV 11.3 8.1 - 13.5 fL    NRBC Automated 0.0 0.0 per 100 WBC    Differential Type NOT REPORTED     Seg Neutrophils 59 36 - 65 %    Lymphocytes 30 24 - 43 %    Monocytes 7 3 - 12 %    Eosinophils % 3 1 - 4 %    Basophils 1 0 - 2 %    Immature Granulocytes 0 0 %    Segs Absolute 7.51 1.50 - 8.10 k/uL    Absolute Lymph # 3.82 (H) 1.10 - 3.70 k/uL    Absolute Mono # 0.86 0.10 - 1.20 k/uL    Absolute Eos # 0.34 0.00 - 0.44 k/uL    Basophils Absolute 0.12 0.00 - 0.20 k/uL    Absolute Immature Granulocyte 0.05 0.00 - 0.30 k/uL    WBC Morphology NOT REPORTED     RBC Morphology NOT REPORTED     Platelet Estimate NOT REPORTED    COMPREHENSIVE METABOLIC PANEL   Result Value Ref Range    Glucose 102 (H) 70 - 99 mg/dL    BUN 8 6 - 20 mg/dL    CREATININE 0.62 0.50 - 0.90 mg/dL    Bun/Cre Ratio NOT REPORTED 9 - 20    Calcium 9.2 8.6 - 10.4 mg/dL    Sodium 135 135 - 144 mmol/L    Potassium 3.7 3.7 - 5.3 mmol/L    Chloride 103 98 - 107 mmol/L    CO2 21 20 - 31 mmol/L    Anion Gap 11 9 - 17 mmol/L    Alkaline Phosphatase 88 35 - 104 U/L    ALT 18 5 - 33 U/L    AST 19 <32 U/L    Total Bilirubin <0.10 (L) 0.3 - 1.2 mg/dL    Total Protein 6.9 6.4 - 8.3 g/dL    Albumin 4.2 3.5 - 5.2 g/dL    Albumin/Globulin Ratio 1.6 1.0 - 2.5    GFR Non-African American >60 >60 mL/min    GFR African American >60 >60 mL/min    GFR Comment          GFR Staging NOT REPORTED    TSH with Reflex   Result Value Ref Range    TSH 0.01 (L) 0.30 - 5.00 mIU/L   CK   Result Value Ref Range    Total CK 61 26 - 192 U/L MYOGLOBIN, SERUM   Result Value Ref Range    Myoglobin <21 (L) 25 - 58 ng/mL   PREGNANCY, URINE   Result Value Ref Range    HCG(Urine) Pregnancy Test NEGATIVE NEGATIVE   Urinalysis Reflex to Culture    Specimen: Urine, clean catch   Result Value Ref Range    Color, UA Yellow Yellow    Turbidity UA Clear Clear    Glucose, Ur NEGATIVE NEGATIVE    Bilirubin Urine NEGATIVE NEGATIVE    Ketones, Urine NEGATIVE NEGATIVE    Specific Gravity, UA 1.008 1.005 - 1.030    Urine Hgb NEGATIVE NEGATIVE    pH, UA 6.5 5.0 - 8.0    Protein, UA NEGATIVE NEGATIVE    Urobilinogen, Urine Normal Normal    Nitrite, Urine NEGATIVE NEGATIVE    Leukocyte Esterase, Urine SMALL (A) NEGATIVE    Urinalysis Comments NOT REPORTED    T4, Free   Result Value Ref Range    Thyroxine, Free 1.29 0.93 - 1.70 ng/dL   Microscopic Urinalysis   Result Value Ref Range    -          WBC, UA 5 TO 10 0 - 5 /HPF    RBC, UA 2 TO 5 0 - 4 /HPF    Casts UA  0 - 8 /LPF     2 TO 5 HYALINE Reference range defined for non-centrifuged specimen. Crystals, UA NOT REPORTED None /HPF    Epithelial Cells UA 5 TO 10 0 - 5 /HPF    Renal Epithelial, UA NOT REPORTED 0 /HPF    Bacteria, UA FEW (A) None    Mucus, UA NOT REPORTED None    Trichomonas, UA NOT REPORTED None    Amorphous, UA NOT REPORTED None    Other Observations UA NOT REPORTED NOT REQ. Yeast, UA NOT REPORTED None         RADIOLOGY:  No results found. IMPRESSION: Neuropathy?  fibromyalgia? EMERGENCY DEPARTMENT COURSE:  ED Course as of 01/15/22 2250   Sat Paul 15, 2022   2000 Re-evaluated patient. Pain still persistent. Toradol did not help. TSH low; no hypothyroidism this time. Possible fibromyalgia or neurologic pain. [DA]   2047 Urinalysis Reflex to Culture [DA]   2055 Pain improved. Urinalysis grossly normal except for few bacteria and small leukocyte esterase. Patient updated. Needs work note.   Plan discharge [DA]      ED Course User Index  [DA] Swati Sofia MD PROCEDURES:  none    CONSULTS:  None        FINAL IMPRESSION      1. Myalgia    2.  Acute cystitis without hematuria          DISPOSITION / PLAN     DISPOSITION Decision To Discharge 01/15/2022 09:25:20 PM      PATIENT REFERRED TO:  Deyanira Butler MD  [address]  Angelica Hernandez [address]  [address] [facility] [address]  [phone]    In 3 days  As needed    [address] [facility]  [address] [facility] [address]  [phone]  Schedule an appointment as soon as possible for a visit in 3 days  As needed    [facility] [address] [facility] [address] [facility] [address]  [phone]  Schedule an appointment as soon as possible for a visit in 3 days        DISCHARGE MEDICATIONS:  Discharge Medication List as of 1/15/2022  9:37 PM      START taking these medications    Details   cephALEXin (KEFLEX) 500 MG capsule Take 1 capsule by mouth 2 times daily for 3 days, Disp-6 capsule, R-0Print             Chris Garsia MD  Emergency Medicine Resident    (Please note that portions of thisnote were completed with a voice recognition program.  Efforts were made to edit the dictations but occasionally words are mis-transcribed.)        Chris Garsia MD  Resident  01/15/22 1602

## 2022-01-15 NOTE — Clinical Note
Mike Mccray was seen and treated in our emergency department on 1/15/2022. She may return to work on 01/18/2022. If you have any questions or concerns, please don't hesitate to call.       Jazmin Perdue MD

## 2022-01-15 NOTE — ED PROVIDER NOTES
[facility]     Emergency Department     Faculty Attestation    I performed a history and physical examination of the patient and discussed management with the resident. I reviewed the resident´s note and agree with the documented findings and plan of care. Any areas of disagreement are noted on the chart. I was personally present for the key portions of any procedures. I have documented in the chart those procedures where I was not present during the key portions. I have reviewed the emergency nurses triage note. I agree with the chief complaint, past medical history, past surgical history, allergies, medications, social and family history as documented unless otherwise noted below. For Physician Assistant/ Nurse Practitioner cases/documentation I have personally evaluated this patient and have completed at least one if not all key elements of the E/M (history, physical exam, and MDM). Additional findings are as noted. Patient complains of diffuse muscle aches, denies cough or chest pain, patient states she has had this before and was told because her thyroid was low. Patient has history of thyroidectomy and takes Synthroid. Patient does not have a history of hypertension. On exam she is awake alert and oriented, diffuse muscle pain without signs of DVT, no pain or swelling over the anterior neck, chest clear, heart exam normal, abdomen soft nontender, no lower extremity pain or swelling on examination.      Sumit Henriquez MD  01/15/22 [id number]

## 2022-02-26 ENCOUNTER — APPOINTMENT (OUTPATIENT)
Dept: GENERAL RADIOLOGY | Age: 42
End: 2022-02-26
Payer: COMMERCIAL

## 2022-02-26 ENCOUNTER — HOSPITAL ENCOUNTER (EMERGENCY)
Age: 42
Discharge: HOME | End: 2022-02-26
Attending: EMERGENCY MEDICINE
Payer: COMMERCIAL

## 2022-02-26 VITALS
WEIGHT: 195 LBS | TEMPERATURE: 99 F | HEIGHT: 64 IN | BODY MASS INDEX: 33.29 KG/M2 | SYSTOLIC BLOOD PRESSURE: 142 MMHG | DIASTOLIC BLOOD PRESSURE: 95 MMHG | RESPIRATION RATE: 18 BRPM | HEART RATE: 73 BPM | OXYGEN SATURATION: 94 %

## 2022-02-26 DIAGNOSIS — R60.9 SWELLING: Primary | ICD-10-CM

## 2022-02-26 LAB
ABSOLUTE EOS #: 0.4 K/UL (ref 0–0.44)
ABSOLUTE IMMATURE GRANULOCYTE: 0.08 K/UL (ref 0–0.3)
ABSOLUTE LYMPH #: 4.16 K/UL (ref 1.1–3.7)
ABSOLUTE MONO #: 0.53 K/UL (ref 0.1–1.2)
ANION GAP SERPL CALCULATED.3IONS-SCNC: 15 MMOL/L (ref 9–17)
BASOPHILS # BLD: 2 % (ref 0–2)
BASOPHILS ABSOLUTE: 0.19 K/UL (ref 0–0.2)
BUN BLDV-MCNC: 7 MG/DL (ref 6–20)
C-REACTIVE PROTEIN: 8.3 MG/L (ref 0–5)
CALCIUM SERPL-MCNC: 9.3 MG/DL (ref 8.6–10.4)
CHLORIDE BLD-SCNC: 101 MMOL/L (ref 98–107)
CO2: 21 MMOL/L (ref 20–31)
CORTISOL: 4.9 UG/DL (ref 2.7–18.4)
CREAT SERPL-MCNC: 0.88 MG/DL (ref 0.5–0.9)
EOSINOPHILS RELATIVE PERCENT: 3 % (ref 1–4)
GFR AFRICAN AMERICAN: >60 ML/MIN
GFR NON-AFRICAN AMERICAN: >60 ML/MIN
GFR SERPL CREATININE-BSD FRML MDRD: ABNORMAL ML/MIN/{1.73_M2}
GLUCOSE BLD-MCNC: 136 MG/DL (ref 70–99)
HCT VFR BLD CALC: 40 % (ref 36.3–47.1)
HEMOGLOBIN: 13.1 G/DL (ref 11.9–15.1)
IMMATURE GRANULOCYTES: 1 %
LYMPHOCYTES # BLD: 33 % (ref 24–43)
MAGNESIUM: 1.9 MG/DL (ref 1.6–2.6)
MCH RBC QN AUTO: 28.5 PG (ref 25.2–33.5)
MCHC RBC AUTO-ENTMCNC: 32.8 G/DL (ref 28.4–34.8)
MCV RBC AUTO: 87 FL (ref 82.6–102.9)
MONOCYTES # BLD: 4 % (ref 3–12)
MYOGLOBIN: 49 NG/ML (ref 25–58)
NRBC AUTOMATED: 0 PER 100 WBC
PDW BLD-RTO: 15 % (ref 11.8–14.4)
PLATELET # BLD: 261 K/UL (ref 138–453)
PMV BLD AUTO: 12.1 FL (ref 8.1–13.5)
POTASSIUM SERPL-SCNC: 3.5 MMOL/L (ref 3.7–5.3)
PRO-BNP: 22 PG/ML
RBC # BLD: 4.6 M/UL (ref 3.95–5.11)
RBC # BLD: ABNORMAL 10*6/UL
RHEUMATOID FACTOR: <10 IU/ML
SEDIMENTATION RATE, ERYTHROCYTE: 19 MM (ref 0–20)
SEG NEUTROPHILS: 57 % (ref 36–65)
SEGMENTED NEUTROPHILS ABSOLUTE COUNT: 7.17 K/UL (ref 1.5–8.1)
SODIUM BLD-SCNC: 137 MMOL/L (ref 135–144)
THYROXINE, FREE: 0.1 NG/DL (ref 0.93–1.7)
TOTAL CK: 260 U/L (ref 26–192)
TROPONIN, HIGH SENSITIVITY: <6 NG/L (ref 0–14)
TSH SERPL DL<=0.05 MIU/L-ACNC: 298.2 MIU/L (ref 0.3–5)
WBC # BLD: 12.5 K/UL (ref 3.5–11.3)

## 2022-02-26 PROCEDURE — 71045 X-RAY EXAM CHEST 1 VIEW: CPT

## 2022-02-26 PROCEDURE — 84443 ASSAY THYROID STIM HORMONE: CPT

## 2022-02-26 PROCEDURE — 83880 ASSAY OF NATRIURETIC PEPTIDE: CPT

## 2022-02-26 PROCEDURE — 86021 WBC ANTIBODY IDENTIFICATION: CPT

## 2022-02-26 PROCEDURE — 84484 ASSAY OF TROPONIN QUANT: CPT

## 2022-02-26 PROCEDURE — 86431 RHEUMATOID FACTOR QUANT: CPT

## 2022-02-26 PROCEDURE — 84439 ASSAY OF FREE THYROXINE: CPT

## 2022-02-26 PROCEDURE — 99284 EMERGENCY DEPT VISIT MOD MDM: CPT

## 2022-02-26 PROCEDURE — 80048 BASIC METABOLIC PNL TOTAL CA: CPT

## 2022-02-26 PROCEDURE — 93005 ELECTROCARDIOGRAM TRACING: CPT | Performed by: EMERGENCY MEDICINE

## 2022-02-26 PROCEDURE — 83735 ASSAY OF MAGNESIUM: CPT

## 2022-02-26 PROCEDURE — 85652 RBC SED RATE AUTOMATED: CPT

## 2022-02-26 PROCEDURE — 6360000002 HC RX W HCPCS: Performed by: STUDENT IN AN ORGANIZED HEALTH CARE EDUCATION/TRAINING PROGRAM

## 2022-02-26 PROCEDURE — 83874 ASSAY OF MYOGLOBIN: CPT

## 2022-02-26 PROCEDURE — 86140 C-REACTIVE PROTEIN: CPT

## 2022-02-26 PROCEDURE — 96374 THER/PROPH/DIAG INJ IV PUSH: CPT

## 2022-02-26 PROCEDURE — 82550 ASSAY OF CK (CPK): CPT

## 2022-02-26 PROCEDURE — 85025 COMPLETE CBC W/AUTO DIFF WBC: CPT

## 2022-02-26 PROCEDURE — 82533 TOTAL CORTISOL: CPT

## 2022-02-26 RX ORDER — KETOROLAC TROMETHAMINE 15 MG/ML
15 INJECTION, SOLUTION INTRAMUSCULAR; INTRAVENOUS ONCE
Status: COMPLETED | OUTPATIENT
Start: 2022-02-26 | End: 2022-02-26

## 2022-02-26 RX ADMIN — KETOROLAC TROMETHAMINE 15 MG: 15 INJECTION, SOLUTION INTRAMUSCULAR; INTRAVENOUS at 19:59

## 2022-02-26 ASSESSMENT — ENCOUNTER SYMPTOMS
BACK PAIN: 0
SORE THROAT: 0
NAUSEA: 0
SHORTNESS OF BREATH: 0
VOMITING: 0
COUGH: 0
ABDOMINAL PAIN: 0

## 2022-02-26 ASSESSMENT — PAIN SCALES - GENERAL: PAINLEVEL_OUTOF10: 10

## 2022-02-27 NOTE — ED PROVIDER NOTES
[facility] ED     Emergency Department     Faculty Attestation        I performed a history and physical examination of the patient and discussed management with the resident. I reviewed the residents note and agree with the documented findings and plan of care. Any areas of disagreement are noted on the chart. I was personally present for the key portions of any procedures. I have documented in the chart those procedures where I was not present during the key portions. I have reviewed the emergency nurses triage note. I agree with the chief complaint, past medical history, past surgical history, allergies, medications, social and family history as documented unless otherwise noted below. For mid-level providers such as nurse practitioners as well as physicians assistants:    I have personally seen and evaluated the patient. I find the patient's history and physical exam are consistent with NP/PA documentation. I agree with the care provided, treatment rendered, disposition, & follow-up plan. Additional findings are as noted. Vital Signs: BP (!) 166/103   Pulse 68   Temp 99 °F (37.2 °C) (Oral)   Resp 16   Ht 5' 4\" (1.626 m)   Wt 195 lb (88.5 kg)   LMP 02/20/2022   SpO2 98%   BMI 33.47 kg/m²   PCP:  Neymar Schaeffer MD    Pertinent Comments:     Patient with chronic edema. Has a history of hypothyroidism and her thyroid medication recently increased. No chest pain or shortness of breath.       Critical Care  None          Rach Baird MD    Attending Emergency Medicine Physician              Matt Alva MD  02/26/22 4935

## 2022-02-27 NOTE — ED PROVIDER NOTES
[facility] ED  Emergency Department Encounter  EmergencyMedicine Resident     Pt Name:Cherrie Parra  MRN: [id number]  [place] 1980  Date of evaluation: 2/26/22  PCP:  Gina Martinez MD    [address] [place] [address]       Chief Complaint   Patient presents with    Joint Swelling     generalized swelling x 2 weeks       HISTORY OF PRESENT ILLNESS  (Location/Symptom, Timing/Onset, Context/Setting, Quality, Duration, Modifying Factors, Severity.)      Ector Elmore is a 35 y.o. female who presents with reports of diffuse generalized swelling for the past couple weeks. Patient has a past medical history of thyroidectomy, fibromyalgia  and carpal tunnel syndrome. She reports for the past couple weeks she has had diffuse swelling throughout her extremities, associated with diffuse myalgias. She reports compliance to her Gary Thyroid. Recently saw her endocrinologist who increased her Gary Thyroid from 180 mg daily to 240 mg daily. She reports compliance these medications. She denies any acute illness, fevers, chills, chest pain, shortness of breath, history of DVT or PE. PAST MEDICAL / SURGICAL / SOCIAL / FAMILY HISTORY      has a past medical history of Carpal tunnel syndrome, Enlarged thyroid, H/O D&C (2001), H/O LEEP (1997), H/O SAB x 4, Incomplete miscarriage, Miscarriage, Psychiatric problem, Thyroid disease, and Thyroiditis. has a past surgical history that includes LEEP; Cholecystectomy; Dilation and curettage of uterus; Dilation and curettage of uterus (06/11/2017); pr induced abortn by dil/evac (N/A, 6/11/2017); and Thyroidectomy.     Social History     Socioeconomic History    Marital status:      Spouse name: Not on file    Number of children: Not on file    Years of education: Not on file    Highest education level: Not on file   Occupational History    Not on file   Tobacco Use    Smoking status: Current Every Day Smoker     Packs/day: 0.50     Years: 23.00     Pack years: 11.50     Types: Cigarettes    Smokeless tobacco: Never Used   Substance and Sexual Activity    Alcohol use: No    Drug use: No    Sexual activity: Not on file   Other Topics Concern    Not on file   Social History Narrative    Not on file     Social Determinants of Health     Financial Resource Strain:     Difficulty of Paying Living Expenses: Not on file   Food Insecurity:     Worried About Running Out of Food in the Last Year: Not on file    Jesica of Food in the Last Year: Not on file   Transportation Needs:     Lack of Transportation (Medical): Not on file    Lack of Transportation (Non-Medical): Not on file   Physical Activity:     Days of Exercise per Week: Not on file    Minutes of Exercise per Session: Not on file   Stress:     Feeling of Stress : Not on file   Social Connections:     Frequency of Communication with Friends and Family: Not on file    Frequency of Social Gatherings with Friends and Family: Not on file    Attends Cheondoism Services: Not on file    Active Member of [address] [place] [address] [employer] or Organizations: Not on file    Attends Club or Organization Meetings: Not on file    Marital Status: Not on file   Intimate Partner Violence:     Fear of Current or Ex-Partner: Not on file    Emotionally Abused: Not on file    Physically Abused: Not on file    Sexually Abused: Not on file   Housing Stability:     Unable to Pay for Housing in the Last Year: Not on file    Number of [place] in the Last Year: Not on file    Unstable Housing in the Last Year: Not on file       Family History   Problem Relation Age of Onset    Diabetes Mother     Mental Illness Mother     Substance Abuse Father        Allergies:  Doxycycline    Home Medications:  Prior to Admission medications    Medication Sig Start Date End Date Taking?  Authorizing Provider   thyroid (GERMAN THYROID) 180 MG tablet Take 180 mg by mouth daily    Historical Provider, MD   ibuprofen (ADVIL;MOTRIN) 600 MG tablet Take 1 tablet by mouth every 8 hours as needed for Pain 9/17/21 1/15/22  Susan Ruby MD   cholestyramine light 4 g packet Take 1 packet by mouth 2 times daily for 7 days 9/8/20 1/15/22  Yasmin Stark MD   famotidine (PEPCID) 20 MG tablet Take 1 tablet by mouth 2 times daily for 14 days 9/8/20 1/15/22  Yasmin Stark MD   folic acid (FOLVITE) 1 MG tablet Take 1 tablet by mouth daily 9/8/20 1/15/22  Yasmin Stark MD       REVIEW OF SYSTEMS    (2-9 systems for level 4, 10 or more for level 5)      Review of Systems   Constitutional: Negative for chills and fever. HENT: Negative for congestion and sore throat. Respiratory: Negative for cough and shortness of breath. Cardiovascular: Negative for chest pain. Gastrointestinal: Negative for abdominal pain, nausea and vomiting. Genitourinary: Negative for dysuria and frequency. Musculoskeletal: Positive for myalgias. Negative for back pain and neck stiffness. Skin: Negative for rash. Neurological: Negative for weakness, numbness and headaches. PHYSICAL EXAM   (up to 7 for level 4, 8 or more for level 5)      INITIAL VITALS:   BP (!) 142/95   Pulse 73   Temp 99 °F (37.2 °C) (Oral)   Resp 18   Ht 5' 4\" (1.626 m)   Wt 195 lb (88.5 kg)   LMP 02/20/2022   SpO2 94%   BMI 33.47 kg/m²      Vitals:    02/26/22 1934 02/26/22 2002 02/26/22 2217   BP: (!) 166/103 (!) 145/97 (!) 142/95   Pulse: 68 79 73   Resp: 16 19 18   Temp: 99 °F (37.2 °C)     TempSrc: Oral     SpO2: 98% 96% 94%   Weight: 195 lb (88.5 kg)     Height: 5' 4\" (1.626 m)          Physical Exam  Vitals reviewed. Constitutional:       General: She is not in acute distress. Appearance: She is well-developed. She is not ill-appearing, toxic-appearing or diaphoretic. HENT:      Head: Normocephalic and atraumatic. Eyes:      Extraocular Movements: Extraocular movements intact. Pupils: Pupils are equal, round, and reactive to light.    Cardiovascular: Rate and Rhythm: Normal rate and regular rhythm. Pulmonary:      Effort: Pulmonary effort is normal.      Breath sounds: Normal breath sounds. Abdominal:      General: There is no distension. Palpations: Abdomen is soft. Tenderness: There is no abdominal tenderness. Musculoskeletal:         General: Normal range of motion. Cervical back: Normal range of motion and neck supple. Comments: Minimal swelling if any on her extremities. Diffuse muscle tenderness. Skin:     General: Skin is warm and dry. Neurological:      General: No focal deficit present. Mental Status: She is alert and oriented to person, place, and time.          DIFFERENTIAL  DIAGNOSIS     PLAN (LABS / IMAGING / EKG):  Orders Placed This Encounter   Procedures    XR CHEST PORTABLE    CBC with Auto Differential    Basic Metabolic Panel w/ Reflex to MG    Troponin    Brain Natriuretic Peptide    TSH with Reflex    Cortisol Total    Sedimentation Rate    C-Reactive Protein    RHEUMATOID FACTOR    ANTI-NEUTROPHIL ANTIBODY    Magnesium    CK    Myoglobin, Serum       MEDICATIONS ORDERED:  Orders Placed This Encounter   Medications    ketorolac (TORADOL) injection 15 mg       DIAGNOSTIC RESULTS / EMERGENCY DEPARTMENT COURSE / MDM   LAB RESULTS:  Results for orders placed or performed during the hospital encounter of 02/26/22   CBC with Auto Differential   Result Value Ref Range    WBC 12.5 (H) 3.5 - 11.3 k/uL    RBC 4.60 3.95 - 5.11 m/uL    Hemoglobin 13.1 11.9 - 15.1 g/dL    Hematocrit 40.0 36.3 - 47.1 %    MCV 87.0 82.6 - 102.9 fL    MCH 28.5 25.2 - 33.5 pg    MCHC 32.8 28.4 - 34.8 g/dL    RDW 15.0 (H) 11.8 - 14.4 %    Platelets 181 264 - 869 k/uL    MPV 12.1 8.1 - 13.5 fL    NRBC Automated 0.0 0.0 per 100 WBC    Seg Neutrophils 57 36 - 65 %    Lymphocytes 33 24 - 43 %    Monocytes 4 3 - 12 %    Eosinophils % 3 1 - 4 %    Basophils 2 0 - 2 %    Immature Granulocytes 1 (H) 0 %    Segs Absolute 7.17 1.50 - 8.10 k/uL    Absolute Lymph # 4.16 (H) 1.10 - 3.70 k/uL    Absolute Mono # 0.53 0.10 - 1.20 k/uL    Absolute Eos # 0.40 0.00 - 0.44 k/uL    Basophils Absolute 0.19 0.00 - 0.20 k/uL    Absolute Immature Granulocyte 0.08 0.00 - 0.30 k/uL    RBC Morphology ANISOCYTOSIS PRESENT    Basic Metabolic Panel w/ Reflex to MG   Result Value Ref Range    Glucose 136 (H) 70 - 99 mg/dL    BUN 7 6 - 20 mg/dL    CREATININE 0.88 0.50 - 0.90 mg/dL    Calcium 9.3 8.6 - 10.4 mg/dL    Sodium 137 135 - 144 mmol/L    Potassium 3.5 (L) 3.7 - 5.3 mmol/L    Chloride 101 98 - 107 mmol/L    CO2 21 20 - 31 mmol/L    Anion Gap 15 9 - 17 mmol/L    GFR Non-African American >60 >60 mL/min    GFR African American >60 >60 mL/min    GFR Comment         Troponin   Result Value Ref Range    Troponin, High Sensitivity <6 0 - 14 ng/L   Brain Natriuretic Peptide   Result Value Ref Range    Pro-BNP 22 <300 pg/mL   Cortisol Total   Result Value Ref Range    Cortisol 4.9 2.7 - 18.4 ug/dL   Sedimentation Rate   Result Value Ref Range    Sed Rate 19 0 - 20 mm   C-Reactive Protein   Result Value Ref Range    CRP 8.3 (H) 0.0 - 5.0 mg/L   RHEUMATOID FACTOR   Result Value Ref Range    Rheumatoid Factor <10 <14 IU/mL   Magnesium   Result Value Ref Range    Magnesium 1.9 1.6 - 2.6 mg/dL   CK   Result Value Ref Range    Total  (H) 26 - 192 U/L   Myoglobin, Serum   Result Value Ref Range    Myoglobin 49 25 - 58 ng/mL         RADIOLOGY:  XR CHEST PORTABLE   Final Result   No acute process. EMERGENCY DEPARTMENT COURSE & MDM:    This is a well-appearing 29-year-old female with hypothyroidism status post thyroidectomy, history of elevated CPK and myoglobin level, obesity coming in here with swelling. I do not see any swelling on my examination. There may be trace swelling in the hands but this is quite minimal. Muscles are tender and soft to palpation. There is no signs of trauma. No signs of cellulitis or infection.  Metabolic and rheumatologic work-up significant for chronically elevated TSH and low T4. Cortisol was normal, rheumatoid factor normal, PETER pending. CRP and myoglobin were also normal. Cardiac work-up unremarkable. Patient discharged to follow-up with PCP and endocrinology    ED Course as of 02/26/22 2317   Sat Feb 26, 2022 2052 Troponin, High Sensitivity: <6 [CS]   2248 Total CK(!): 260 [CS]   2248 Myoglobin: 49 [CS]   2248 Atraumatic rhabdo ruled out   [CS]   2249 Troponin, High Sensitivity: <6 [CS]   2249 Pro-BNP: 22 [CS]   2249 Rheumatoid Factor: <10 [CS]   2259 Free T4 - 0.12  TSH > 100 [CS]   0410 TSH is elevated and free T4 is elevated. Unchanged from couple weeks ago. Patient not in myxedema coma as she does not have severe lethargy, bradycardia, hypoglycemic or cold. Thresa Abed Appears well nontoxic. Do not see any significant swelling as described by the patient. We will have her follow-up with PCP and endocrinology [CS]      ED Course User Index  [CS] Tracy Orr DO         PROCEDURES:      CONSULTS:  None    CRITICAL CARE:  Please see attending note    FINAL IMPRESSION      1.  Swelling          DISPOSITION / PLAN     DISPOSITION        PATIENT REFERRED TO:  Romi Tejeda, [address] [facility]  [phone]            DISCHARGE MEDICATIONS:  New Prescriptions    No medications on file       Tracy Orr DO  Emergency Medicine Resident    (Please note that portions of thisnote were completed with a voice recognition program.  Efforts were made to edit the dictations but occasionally words are mis-transcribed.)        Tracy Orr DO  Resident  02/26/22 0169

## 2022-02-27 NOTE — ED NOTES
Pt states she has been having swelling x 2 weeks all over. Pt states she had her tyroid removed and states she has had this problem before. Pt reports generalized pain, denies SOB. Pt is alert and oriented. NAD at this time. Will continue to monitor. Placed on cardiac monitor, EKG and IV established.        Raymundo Marmolejo RN  02/26/22 2002

## 2022-02-27 NOTE — ED PROVIDER NOTES
Faculty Sign-Out Attestation  Handoff taken on the following patient from prior Attending Physician: Winter Jalloh    I was available and discussed any additional care issues that arose and coordinated the management plans with the resident(s) caring for the patient during my duty period. Any areas of disagreement with residents documentation of care or procedures are noted on the chart. I was personally present for the key portions of any/all procedures during my duty period. I have documented in the chart those procedures where I was not present during the key portions.     Joint swelling / complaint unfounded, pt request admit without having objective finding warranting admit,   Plan to discharge    Melly Patten DO  Attending Physician     Melly Patten,   02/26/22 1816    discharged     Melly Patten DO  02/27/22 0810

## 2022-02-28 ENCOUNTER — HOSPITAL ENCOUNTER (INPATIENT)
Age: 42
LOS: 2 days | Discharge: HOME | DRG: 645 | End: 2022-03-03
Attending: EMERGENCY MEDICINE | Admitting: INTERNAL MEDICINE
Payer: COMMERCIAL

## 2022-02-28 DIAGNOSIS — E03.9 HYPOTHYROIDISM, UNSPECIFIED TYPE: Primary | ICD-10-CM

## 2022-02-28 LAB
ABSOLUTE EOS #: 0.41 K/UL (ref 0–0.4)
ABSOLUTE IMMATURE GRANULOCYTE: 0 K/UL (ref 0–0.3)
ABSOLUTE LYMPH #: 4.14 K/UL (ref 1–4.8)
ABSOLUTE MONO #: 0.83 K/UL (ref 0.1–0.8)
BASOPHILS # BLD: 0 % (ref 0–2)
BASOPHILS ABSOLUTE: 0 K/UL (ref 0–0.2)
EKG ATRIAL RATE: 71 BPM
EKG P AXIS: 64 DEGREES
EKG P-R INTERVAL: 158 MS
EKG Q-T INTERVAL: 402 MS
EKG QRS DURATION: 88 MS
EKG QTC CALCULATION (BAZETT): 436 MS
EKG R AXIS: 71 DEGREES
EKG T AXIS: 66 DEGREES
EKG VENTRICULAR RATE: 71 BPM
EOSINOPHILS RELATIVE PERCENT: 3 % (ref 1–4)
HCG QUALITATIVE: NEGATIVE
HCT VFR BLD CALC: 38.7 % (ref 36.3–47.1)
HEMOGLOBIN: 13.1 G/DL (ref 11.9–15.1)
IMMATURE GRANULOCYTES: 0 %
LYMPHOCYTES # BLD: 30 % (ref 24–44)
MCH RBC QN AUTO: 29 PG (ref 25.2–33.5)
MCHC RBC AUTO-ENTMCNC: 33.9 G/DL (ref 28.4–34.8)
MCV RBC AUTO: 85.8 FL (ref 82.6–102.9)
MONOCYTES # BLD: 6 % (ref 1–7)
MORPHOLOGY: ABNORMAL
MORPHOLOGY: ABNORMAL
NRBC AUTOMATED: 0 PER 100 WBC
PDW BLD-RTO: 14.9 % (ref 11.8–14.4)
PLATELET # BLD: ABNORMAL K/UL (ref 138–453)
PLATELET, FLUORESCENCE: NORMAL K/UL (ref 138–453)
RBC # BLD: 4.51 M/UL (ref 3.95–5.11)
SEG NEUTROPHILS: 61 % (ref 36–66)
SEGMENTED NEUTROPHILS ABSOLUTE COUNT: 8.42 K/UL (ref 1.8–7.7)
WBC # BLD: 13.8 K/UL (ref 3.5–11.3)

## 2022-02-28 PROCEDURE — 85055 RETICULATED PLATELET ASSAY: CPT

## 2022-02-28 PROCEDURE — 83735 ASSAY OF MAGNESIUM: CPT

## 2022-02-28 PROCEDURE — 96361 HYDRATE IV INFUSION ADD-ON: CPT

## 2022-02-28 PROCEDURE — 84703 CHORIONIC GONADOTROPIN ASSAY: CPT

## 2022-02-28 PROCEDURE — 84443 ASSAY THYROID STIM HORMONE: CPT

## 2022-02-28 PROCEDURE — 99282 EMERGENCY DEPT VISIT SF MDM: CPT

## 2022-02-28 PROCEDURE — 84439 ASSAY OF FREE THYROXINE: CPT

## 2022-02-28 PROCEDURE — 85025 COMPLETE CBC W/AUTO DIFF WBC: CPT

## 2022-02-28 PROCEDURE — 83874 ASSAY OF MYOGLOBIN: CPT

## 2022-02-28 PROCEDURE — 80048 BASIC METABOLIC PNL TOTAL CA: CPT

## 2022-02-28 PROCEDURE — 2580000003 HC RX 258: Performed by: STUDENT IN AN ORGANIZED HEALTH CARE EDUCATION/TRAINING PROGRAM

## 2022-02-28 PROCEDURE — 93010 ELECTROCARDIOGRAM REPORT: CPT | Performed by: INTERNAL MEDICINE

## 2022-02-28 PROCEDURE — 82550 ASSAY OF CK (CPK): CPT

## 2022-02-28 RX ORDER — 0.9 % SODIUM CHLORIDE 0.9 %
1000 INTRAVENOUS SOLUTION INTRAVENOUS ONCE
Status: COMPLETED | OUTPATIENT
Start: 2022-02-28 | End: 2022-03-01

## 2022-02-28 RX ADMIN — SODIUM CHLORIDE 1000 ML: 9 INJECTION, SOLUTION INTRAVENOUS at 23:26

## 2022-02-28 ASSESSMENT — PAIN DESCRIPTION - LOCATION: LOCATION: GENERALIZED

## 2022-02-28 ASSESSMENT — PAIN SCALES - GENERAL: PAINLEVEL_OUTOF10: 10

## 2022-02-28 NOTE — LETTER
[facility] Onc/Med [facility]  [address] [facility] [address]  Phone: [phone]         March 3, 2022     Patient: Chaya Salomon   YOB: 1980   Date of Visit: 2/28/2022       To Whom It May Concern:    Cherrie Maki was admitted on 2/28/2022 and discharged on 3/3/2022. If you have any questions, please feel free to call the unit at [phone].       Sincerely,        Sina Ordoñez RN        Signature:__________________________________

## 2022-03-01 LAB
-: NORMAL
ANION GAP SERPL CALCULATED.3IONS-SCNC: 15 MMOL/L (ref 9–17)
BILIRUBIN URINE: NEGATIVE
BUN BLDV-MCNC: 9 MG/DL (ref 6–20)
CALCIUM SERPL-MCNC: 9.5 MG/DL (ref 8.6–10.4)
CHLORIDE BLD-SCNC: 99 MMOL/L (ref 98–107)
CHOLESTEROL/HDL RATIO: 6.2
CHOLESTEROL: 278 MG/DL
CO2: 23 MMOL/L (ref 20–31)
COLOR: YELLOW
CREAT SERPL-MCNC: 1.01 MG/DL (ref 0.5–0.9)
EPITHELIAL CELLS UA: NORMAL /HPF (ref 0–5)
GFR AFRICAN AMERICAN: >60 ML/MIN
GFR NON-AFRICAN AMERICAN: 60 ML/MIN
GFR SERPL CREATININE-BSD FRML MDRD: ABNORMAL ML/MIN/{1.73_M2}
GLUCOSE BLD-MCNC: 144 MG/DL (ref 70–99)
GLUCOSE URINE: NEGATIVE
HDLC SERPL-MCNC: 45 MG/DL
KETONES, URINE: NEGATIVE
LDL CHOLESTEROL: 183 MG/DL (ref 0–130)
LEUKOCYTE ESTERASE, URINE: NEGATIVE
MAGNESIUM: 1.9 MG/DL (ref 1.6–2.6)
MYOGLOBIN: 67 NG/ML (ref 25–58)
NITRITE, URINE: NEGATIVE
PH UA: 6.5 (ref 5–8)
POTASSIUM SERPL-SCNC: 3.5 MMOL/L (ref 3.7–5.3)
PROTEIN UA: ABNORMAL
RBC UA: NORMAL /HPF (ref 0–4)
SODIUM BLD-SCNC: 137 MMOL/L (ref 135–144)
SPECIFIC GRAVITY UA: 1.01 (ref 1–1.03)
THYROXINE, FREE: 0.22 NG/DL (ref 0.93–1.7)
TOTAL CK: 226 U/L (ref 26–192)
TOTAL CK: 294 U/L (ref 26–192)
TRIGL SERPL-MCNC: 249 MG/DL
TSH SERPL DL<=0.05 MIU/L-ACNC: 261.81 MIU/L (ref 0.3–5)
TURBIDITY: CLEAR
URINE HGB: NEGATIVE
UROBILINOGEN, URINE: NORMAL
WBC UA: NORMAL /HPF (ref 0–5)

## 2022-03-01 PROCEDURE — 82550 ASSAY OF CK (CPK): CPT

## 2022-03-01 PROCEDURE — 97530 THERAPEUTIC ACTIVITIES: CPT

## 2022-03-01 PROCEDURE — 2500000003 HC RX 250 WO HCPCS: Performed by: PHYSICIAN ASSISTANT

## 2022-03-01 PROCEDURE — 96365 THER/PROPH/DIAG IV INF INIT: CPT

## 2022-03-01 PROCEDURE — 6370000000 HC RX 637 (ALT 250 FOR IP): Performed by: STUDENT IN AN ORGANIZED HEALTH CARE EDUCATION/TRAINING PROGRAM

## 2022-03-01 PROCEDURE — G0378 HOSPITAL OBSERVATION PER HR: HCPCS

## 2022-03-01 PROCEDURE — 36415 COLL VENOUS BLD VENIPUNCTURE: CPT

## 2022-03-01 PROCEDURE — 97535 SELF CARE MNGMENT TRAINING: CPT

## 2022-03-01 PROCEDURE — 1200000000 HC SEMI PRIVATE

## 2022-03-01 PROCEDURE — 2580000003 HC RX 258: Performed by: NURSE PRACTITIONER

## 2022-03-01 PROCEDURE — 81001 URINALYSIS AUTO W/SCOPE: CPT

## 2022-03-01 PROCEDURE — 80061 LIPID PANEL: CPT

## 2022-03-01 PROCEDURE — 6370000000 HC RX 637 (ALT 250 FOR IP): Performed by: NURSE PRACTITIONER

## 2022-03-01 PROCEDURE — 6360000002 HC RX W HCPCS: Performed by: STUDENT IN AN ORGANIZED HEALTH CARE EDUCATION/TRAINING PROGRAM

## 2022-03-01 PROCEDURE — 6370000000 HC RX 637 (ALT 250 FOR IP): Performed by: PHYSICIAN ASSISTANT

## 2022-03-01 PROCEDURE — 96375 TX/PRO/DX INJ NEW DRUG ADDON: CPT

## 2022-03-01 PROCEDURE — 2580000003 HC RX 258: Performed by: PHYSICIAN ASSISTANT

## 2022-03-01 PROCEDURE — 97165 OT EVAL LOW COMPLEX 30 MIN: CPT

## 2022-03-01 PROCEDURE — 96366 THER/PROPH/DIAG IV INF ADDON: CPT

## 2022-03-01 PROCEDURE — 96361 HYDRATE IV INFUSION ADD-ON: CPT

## 2022-03-01 PROCEDURE — 99222 1ST HOSP IP/OBS MODERATE 55: CPT | Performed by: PHYSICIAN ASSISTANT

## 2022-03-01 RX ORDER — PREGABALIN 100 MG/1
100 CAPSULE ORAL 3 TIMES DAILY
COMMUNITY

## 2022-03-01 RX ORDER — ONDANSETRON 4 MG/1
4 TABLET, ORALLY DISINTEGRATING ORAL EVERY 8 HOURS PRN
Status: DISCONTINUED | OUTPATIENT
Start: 2022-03-01 | End: 2022-03-03 | Stop reason: HOSPADM

## 2022-03-01 RX ORDER — SODIUM CHLORIDE 0.9 % (FLUSH) 0.9 %
10 SYRINGE (ML) INJECTION PRN
Status: DISCONTINUED | OUTPATIENT
Start: 2022-03-01 | End: 2022-03-03 | Stop reason: HOSPADM

## 2022-03-01 RX ORDER — POTASSIUM CHLORIDE 7.45 MG/ML
10 INJECTION INTRAVENOUS PRN
Status: DISCONTINUED | OUTPATIENT
Start: 2022-03-01 | End: 2022-03-03 | Stop reason: HOSPADM

## 2022-03-01 RX ORDER — ONDANSETRON 2 MG/ML
4 INJECTION INTRAMUSCULAR; INTRAVENOUS EVERY 6 HOURS PRN
Status: DISCONTINUED | OUTPATIENT
Start: 2022-03-01 | End: 2022-03-03 | Stop reason: HOSPADM

## 2022-03-01 RX ORDER — LEVOTHYROXINE SODIUM 0.05 MG/1
50 TABLET ORAL DAILY
Status: ON HOLD | COMMUNITY
End: 2022-04-30 | Stop reason: HOSPADM

## 2022-03-01 RX ORDER — LEVOTHYROXINE SODIUM ANHYDROUS 200 UG/5ML
100 INJECTION, POWDER, LYOPHILIZED, FOR SOLUTION INTRAVENOUS ONCE
Status: COMPLETED | OUTPATIENT
Start: 2022-03-01 | End: 2022-03-01

## 2022-03-01 RX ORDER — LEVOTHYROXINE SODIUM 0.05 MG/1
50 TABLET ORAL DAILY
Status: DISCONTINUED | OUTPATIENT
Start: 2022-03-01 | End: 2022-03-03 | Stop reason: HOSPADM

## 2022-03-01 RX ORDER — SODIUM CHLORIDE 0.9 % (FLUSH) 0.9 %
5-40 SYRINGE (ML) INJECTION EVERY 12 HOURS SCHEDULED
Status: DISCONTINUED | OUTPATIENT
Start: 2022-03-01 | End: 2022-03-03 | Stop reason: HOSPADM

## 2022-03-01 RX ORDER — SODIUM CHLORIDE 9 MG/ML
INJECTION, SOLUTION INTRAVENOUS CONTINUOUS
Status: DISCONTINUED | OUTPATIENT
Start: 2022-03-01 | End: 2022-03-03 | Stop reason: HOSPADM

## 2022-03-01 RX ORDER — LIOTHYRONINE SODIUM 25 UG/1
25 TABLET ORAL DAILY
Status: DISCONTINUED | OUTPATIENT
Start: 2022-03-01 | End: 2022-03-03 | Stop reason: HOSPADM

## 2022-03-01 RX ORDER — PREGABALIN 100 MG/1
100 CAPSULE ORAL 3 TIMES DAILY
Status: DISCONTINUED | OUTPATIENT
Start: 2022-03-01 | End: 2022-03-03 | Stop reason: HOSPADM

## 2022-03-01 RX ORDER — SODIUM CHLORIDE 9 MG/ML
25 INJECTION, SOLUTION INTRAVENOUS PRN
Status: DISCONTINUED | OUTPATIENT
Start: 2022-03-01 | End: 2022-03-03 | Stop reason: HOSPADM

## 2022-03-01 RX ORDER — MAGNESIUM SULFATE 1 G/100ML
1000 INJECTION INTRAVENOUS PRN
Status: DISCONTINUED | OUTPATIENT
Start: 2022-03-01 | End: 2022-03-03 | Stop reason: HOSPADM

## 2022-03-01 RX ORDER — ACETAMINOPHEN 650 MG/1
650 SUPPOSITORY RECTAL EVERY 6 HOURS PRN
Status: DISCONTINUED | OUTPATIENT
Start: 2022-03-01 | End: 2022-03-03 | Stop reason: HOSPADM

## 2022-03-01 RX ORDER — ACETAMINOPHEN 325 MG/1
650 TABLET ORAL EVERY 6 HOURS PRN
Status: DISCONTINUED | OUTPATIENT
Start: 2022-03-01 | End: 2022-03-03 | Stop reason: HOSPADM

## 2022-03-01 RX ORDER — POTASSIUM CHLORIDE 20 MEQ/1
40 TABLET, EXTENDED RELEASE ORAL PRN
Status: DISCONTINUED | OUTPATIENT
Start: 2022-03-01 | End: 2022-03-03 | Stop reason: HOSPADM

## 2022-03-01 RX ORDER — POLYETHYLENE GLYCOL 3350 17 G/17G
17 POWDER, FOR SOLUTION ORAL DAILY PRN
Status: DISCONTINUED | OUTPATIENT
Start: 2022-03-01 | End: 2022-03-03 | Stop reason: HOSPADM

## 2022-03-01 RX ORDER — MAGNESIUM SULFATE IN WATER 40 MG/ML
2000 INJECTION, SOLUTION INTRAVENOUS ONCE
Status: COMPLETED | OUTPATIENT
Start: 2022-03-01 | End: 2022-03-01

## 2022-03-01 RX ORDER — LIOTHYRONINE SODIUM 25 UG/1
25 TABLET ORAL DAILY
Status: ON HOLD | COMMUNITY
End: 2022-04-30 | Stop reason: HOSPADM

## 2022-03-01 RX ADMIN — LEVOTHYROXINE SODIUM 50 MCG: 50 TABLET ORAL at 13:07

## 2022-03-01 RX ADMIN — THYROID, PORCINE 180 MG: 180 TABLET ORAL at 09:14

## 2022-03-01 RX ADMIN — LEVOTHYROXINE SODIUM ANHYDROUS 100 MCG: 200 INJECTION, POWDER, LYOPHILIZED, FOR SOLUTION INTRAVENOUS at 17:26

## 2022-03-01 RX ADMIN — PREGABALIN 100 MG: 100 CAPSULE ORAL at 20:28

## 2022-03-01 RX ADMIN — ACETAMINOPHEN 650 MG: 325 TABLET ORAL at 19:49

## 2022-03-01 RX ADMIN — LIOTHYRONINE SODIUM 25 MCG: 25 TABLET ORAL at 13:08

## 2022-03-01 RX ADMIN — MAGNESIUM SULFATE 2000 MG: 2 INJECTION INTRAVENOUS at 00:58

## 2022-03-01 RX ADMIN — SODIUM CHLORIDE, PRESERVATIVE FREE 10 ML: 5 INJECTION INTRAVENOUS at 09:16

## 2022-03-01 RX ADMIN — SODIUM CHLORIDE: 9 INJECTION, SOLUTION INTRAVENOUS at 16:48

## 2022-03-01 RX ADMIN — POTASSIUM BICARBONATE 20 MEQ: 782 TABLET, EFFERVESCENT ORAL at 00:58

## 2022-03-01 ASSESSMENT — ENCOUNTER SYMPTOMS
WHEEZING: 0
SINUS PRESSURE: 0
VOMITING: 0
CONSTIPATION: 0
SORE THROAT: 0
DIARRHEA: 0
TROUBLE SWALLOWING: 0
NAUSEA: 1
ABDOMINAL PAIN: 0
CHEST TIGHTNESS: 0
COUGH: 0
SINUS PAIN: 0
SHORTNESS OF BREATH: 0
EYE REDNESS: 0
PHOTOPHOBIA: 0
COLOR CHANGE: 0
BACK PAIN: 1

## 2022-03-01 ASSESSMENT — PAIN DESCRIPTION - FREQUENCY: FREQUENCY: INTERMITTENT

## 2022-03-01 ASSESSMENT — PAIN DESCRIPTION - DESCRIPTORS
DESCRIPTORS: CRAMPING
DESCRIPTORS: HEADACHE

## 2022-03-01 ASSESSMENT — PAIN DESCRIPTION - LOCATION
LOCATION: HEAD
LOCATION: GENERALIZED
LOCATION: GENERALIZED

## 2022-03-01 ASSESSMENT — PAIN SCALES - GENERAL
PAINLEVEL_OUTOF10: 10
PAINLEVEL_OUTOF10: 4
PAINLEVEL_OUTOF10: 3
PAINLEVEL_OUTOF10: 2
PAINLEVEL_OUTOF10: 3

## 2022-03-01 ASSESSMENT — PAIN - FUNCTIONAL ASSESSMENT: PAIN_FUNCTIONAL_ASSESSMENT: ACTIVITIES ARE NOT PREVENTED

## 2022-03-01 ASSESSMENT — PAIN DESCRIPTION - PROGRESSION: CLINICAL_PROGRESSION: NOT CHANGED

## 2022-03-01 ASSESSMENT — PAIN DESCRIPTION - ONSET: ONSET: GRADUAL

## 2022-03-01 ASSESSMENT — PAIN DESCRIPTION - PAIN TYPE
TYPE: ACUTE PAIN
TYPE: ACUTE PAIN

## 2022-03-01 NOTE — PROGRESS NOTES
Occupational Therapy   Occupational Therapy Initial Assessment  Date: 3/1/2022   Patient Name: Chaya Salomon  MRN: [id number]     : 1980    Date of Service: 3/1/2022    Discharge Recommendations: No therapy recommended at discharge. Copied from Emergency Medicine:  Chaya Salomon is a 35 y. o.yo female who presents with concern for thyroid issues. Patient was seen on the 26 due to concerns for swelling. Patient had extensive lab work done at that time which demonstrated elevated TSH and low T4. Patient has history of hypothyroidism on thyroid [place]. Patient states that she has had a difficult time controlling her hypothyroidism and sees an endocrinologist.  Patient does complain of diffuse body aches and muscle stiffness. Patient states that her endocrinologist saw her lab work from the  and requested that she return to the emergency department for admission to the hospital.  They did call in additional thyroid medication which she was able to  but has not started taking. Apparently her endocrinologist was unable to see her until the end of next week. Patient denies any other concerns or complaints    Assessment    Pt walking in hallway into room when OT entered pts room. Pt stood to take sweatshirt off Suze. Pt I with mobility in room and outside of room with good balance. Pt sat at eob 17 minutes with good unsupported sitting balance. Sit to stand with I. Pt completed dynamic mob in room with I. Pt reports feeling winded and tired after mobility. Pt ed on energy conservation tech with fair return for ADLS and IADLS. Please refer to below assist levels for adl completion. Pt ed on OT POC, safety awareness tech, proper hand placement for transfers, and energy conservation tech with proper breathing tech, joint protection with fair return. Will continue to educate and provide written handouts next session to assist with recall.  Pt retired supine in bed with call light and phone in reach. All needs met upon exit. RN notified. Performance deficits / Impairments:  (increased generalized pain)  Treatment Diagnosis: Hypothyroidism  Prognosis: Good  Decision Making: Low Complexity  OT Education: OT Role;Plan of Care;Energy Conservation (joint protection, work simplification)  REQUIRES OT FOLLOW UP: Yes  Activity Tolerance  Activity Tolerance: Patient limited by pain; Patient limited by fatigue  Safety Devices  Safety Devices in place: Yes  Type of devices: Call light within reach; Left in bed;Nurse notified  Restraints  Initially in place: No         Patient Diagnosis(es): The encounter diagnosis was Hypothyroidism, unspecified type. has a past medical history of Carpal tunnel syndrome, Enlarged thyroid, H/O D&C (2001), H/O LEEP (1997), H/O SAB x 4, Incomplete miscarriage, Miscarriage, Psychiatric problem, Thyroid disease, and Thyroiditis. has a past surgical history that includes LEEP; Cholecystectomy; Dilation and curettage of uterus; Dilation and curettage of uterus (06/11/2017); pr induced abortn by dil/evac (N/A, 6/11/2017); and Thyroidectomy. Treatment Diagnosis: Hypothyroidism      Restrictions  Restrictions/Precautions  Required Braces or Orthoses?: No  Position Activity Restriction  Other position/activity restrictions: up with assistance; abnormal labs and full body swelling    Subjective   General  Patient assessed for rehabilitation services?: Yes  Family / Caregiver Present: No  Patient Currently in Pain: Yes  Pain Assessment  Pain Assessment: 0-10  Pain Level: 10 (pt stated pain as 11)  Pain Type: Acute pain  Pain Location: Generalized  Non-Pharmaceutical Pain Intervention(s): Repositioned; Ambulation/Increased Activity; Therapeutic presence;Distraction  Vital Signs  Patient Currently in Pain: Yes  Oxygen Therapy  O2 Device: None (Room air)  Social/Functional History  Social/Functional History  Lives With: Leigh Castellon (four sons ages 20,16, 7,and 8 yrs old)  Type of Home: House  Home Layout: One level  Home Access: Stairs to enter with rails  Entrance Stairs - Number of Steps: 5-6 steps to enter  Entrance Stairs - Rails: Left  Bathroom Shower/Tub: Doors,Walk-in shower  Bathroom Toilet: Standard  Bathroom Equipment: Grab bars in shower,Built-in shower seat  Home Equipment:  (no DME)  Receives Help From: Family (supportive spouse but works a lot away from home. Hours are 6am-7pm. Children are helpful and supportive)  ADL Assistance: Independent  Homemaking Assistance: Independent  Homemaking Responsibilities: Yes (spouse and children will help out as able)  Meal Prep Responsibility: Primary  Laundry Responsibility: Primary  Cleaning Responsibility: Primary  Bill Paying/Finance Responsibility: Primary  Shopping Responsibility: Primary  Dependent Care Responsibility: Primary  Ambulation Assistance: Independent  Transfer Assistance: Independent  Active : Yes  Mode of Transportation: Car  Occupation: Full time employment  Type of occupation:  at [employer]: [address] [place] [address] with [address] [facility], [address] [facility]     Objective   Vision: Impaired  Vision Exceptions: Wears glasses for distance;Wears glasses for reading (when wearing contacts, pt needs to wear cheeters for close up. Pt also has bifocals)  Hearing: Within functional limits    Orientation  Overall Orientation Status: Within Functional Limits     Balance  Sitting Balance: Independent  Standing Balance: Independent  Standing Balance  Time: ~5 min  Activity: Static and dynamic  Functional Mobility  Functional - Mobility Device: No device  Activity: To/from bathroom  Assist Level:  Independent  Toilet Transfers  Toilet - Technique: Ambulating  Equipment Used: Standard toilet  Toilet Transfer: Independent  ADL  Feeding: Independent;Setup  Grooming: Independent;Setup  UE Bathing: Independent;Setup  LE Bathing: Independent;Setup  UE Dressing: Independent;Setup  LE Dressing: Independent;Setup  Toileting: Independent  Additional Comments: Pt is I with all occupations, with pain and discomfort. Pt could benefit from further in depth education on energy conservation tech, joint protection, work simplification tech for [employer] and IADLS. Pt has had multiple admissions for same diagnosis  Tone RUE  RUE Tone: Normotonic  Tone LUE  LUE Tone: Normotonic  Coordination  Movements Are Fluid And Coordinated: Yes  Coordination and Movement description: Fine motor impairments;Decreased accuracy; Right UE;Left UE  Quality of Movement Other  Comment: pt reports increased weakness at times in hands that cause pt to drop items, will ask kids for help with hot items on stove or in oven     Bed mobility  Rolling to Left: Independent  Rolling to Right: Independent  Supine to Sit: Independent  Sit to Supine: Independent  Scooting: Independent  Transfers  Sit to stand: Independent  Stand to sit: Independent     Cognition  Overall Cognitive Status: WFL        Sensation  Overall Sensation Status: Impaired  Light Touch: Partial deficits in the RUE;Partial deficits in the LUE (pt reports numbness/tingling that comes and goes)      LUE PROM (degrees)  LUE PROM: WFL  LUE AROM (degrees)  LUE AROM : WFL  Left Hand PROM (degrees)  Left Hand PROM: WFL  Left Hand AROM (degrees)  Left Hand AROM: WFL  RUE PROM (degrees)  RUE PROM: WFL  RUE AROM (degrees)  RUE AROM : WFL  Right Hand PROM (degrees)  Right Hand PROM: WFL  Right Hand AROM (degrees)  Right Hand AROM: WFL  LUE Strength  Gross LUE Strength: WFL  L Hand General: 4-/5  LUE Strength Comment: 4-/5 overall muscle strength. Pt is painful all over along with generalized edema. RUE Strength  Gross RUE Strength: WFL  R Hand General: 4-/5  RUE Strength Comment: 4-/5 overall muscle strength. Pt is painful all over along with generalized edema.      Hand Dominance  Hand Dominance: Right      Plan   Plan  Times per week: 1-2 tx sessions  Current Treatment Recommendations: Endurance Training,Pain Management (joint protection/work simplification)    AM-PAC Score  AM-PAC Inpatient Daily Activity Raw Score: 24 (03/01/22 1150)  AM-PAC Inpatient ADL T-Scale Score : 57.54 (03/01/22 1150)  ADL Inpatient CMS 0-100% Score: 0 (03/01/22 1150)  ADL Inpatient CMS G-Code Modifier : [address] (03/01/22 1150)    Goals  Short term goals  Time Frame for Short term goals: Pt will, by discharge:  Short term goal 1: Dem energy conservation tech during a 20 min adl performance Suze to increase activity tolerance  Short term goal 2: Dem joint protection/work simplification for IADLS Suze to assist with edema management  Short term goal 3: Dem 2 pain  to assist with pain control for successful completion of ADLS/IADLS       Therapy Time   Individual Concurrent Group Co-treatment   Time In 1007         Time Out 1033         Minutes 26         Timed Code Treatment Minutes: [facility] 36, OTR/L

## 2022-03-01 NOTE — H&P
[facility]  Office: [address], DO, Lcui Ash, DO, Taty Tobinsienna, DO, Donaldo Epps Blood, DO, Dieter Lockwood MD, Matt Batista MD, Farhan Disla MD, Joceline Salguero MD, Tate Salgado MD, Radha Pretty MD, Reese Lopez MD, Sandy Vo, DO, María Davisk, DO, Michelle Stoddard MD,  Zoie Red, DO, Jeffrey Shipman MD, Joe Mccoy MD, Amy Brown MD, Paulina Stearns MD, Adelina Da Silva MD, Pricilla Fothergill, [facility], [facility] Reinaldo, [facility], Rosendo Prajapati, CNP, Farzana Thompson, CNS, Marysol Balderrama, CNP, Tobi Messina, CNP, Jono Barrett, CNP, Glenn Bustamante, CNP, Ryne Ndiaye, CNP, Marco Velez PA-C, Salome Hare, [facility], Valarie Oshea, [facility], Shon Overton, CNP, France Tsang, CNP, Susannah Crawford, CNP, Kelly Jasmine, CNP, Ai Rasmussen, CNP, Abby Gruber, [address]    HISTORY AND PHYSICAL EXAMINATION            Date:   3/1/2022  Patient name:  Khalif Henning  Date of admission:  2/28/2022 10:35 PM  MRN:   [id number]  Account:  [de-identified]  YOB: 1980  PCP:    Deyanira Butler MD  Room:   2336/9420-24  Code Status:    Full Code    Chief Complaint:     Chief Complaint   Patient presents with    Other     abnormal labs and full body swelling     History Obtained From:     patient, electronic medical record    History of Present Illness:     Khalif Henning is a 35 y.o. female past medical history significant for hypothyroidism. Patient reports that she has had extensive difficulty with her thyroid levels in the past.  She has been on multiple different thyroid medications. Currently she is on [place] Thyroid. Her dose was increased from 180 to 240 mg approximately 2 weeks ago as she was having worsening symptoms including lower extremity swelling, muscle stiffness/rigidity, hair loss, cold intolerance, fatigue. After increasing her thyroid dose, she has not noticed any significant improvement.   Her endocrinologist rechecked her thyroid labs and noted her TSH to be very elevated and T4 very low prompting her admission to the hospital.  Currently, she is complaining of pain and swelling in her lower extremities. She states that her muscles feel very tight and sore. Past Medical History:     Past Medical History:   Diagnosis Date    Carpal tunnel syndrome     Enlarged thyroid 10/30/2018    H/O D&C (2001) 6/11/2017    H/O LEEP (1997) 6/11/2017    H/O SAB x 4 6/11/2017    Incomplete miscarriage 6/11/2017    Miscarriage     Psychiatric problem     Thyroid disease     Thyroiditis 12/2/2017        Past Surgical History:     Past Surgical History:   Procedure Laterality Date    CHOLECYSTECTOMY      DILATION AND CURETTAGE OF UTERUS      DILATION AND CURETTAGE OF UTERUS  06/11/2017    LEEP      GA INDUCED ABORTN BY DIL/EVAC N/A 6/11/2017    DILATATION AND CURETTAGE SUCTION performed by Francisco J Bains DO at [address] [place] [address],[address]          Medications Prior to Admission:     Prior to Admission medications    Medication Sig Start Date End Date Taking? Authorizing Provider   levothyroxine (SYNTHROID) 50 MCG tablet Take 50 mcg by mouth Daily   Yes Historical Provider, MD   liothyronine (CYTOMEL) 25 MCG tablet Take 25 mcg by mouth daily   Yes Historical Provider, MD   pregabalin (LYRICA) 100 MG capsule Take 100 mg by mouth 3 times daily.    Yes Historical Provider, MD   thyroid (ARMOUR THYROID) 180 MG tablet Take 240 mg by mouth daily Pt takes 240 mg daily    Historical Provider, MD   ibuprofen (ADVIL;MOTRIN) 600 MG tablet Take 1 tablet by mouth every 8 hours as needed for Pain 9/17/21 1/15/22  Garland Sanon MD   cholestyramine light 4 g packet Take 1 packet by mouth 2 times daily for 7 days 9/8/20 1/15/22  Vivienne Owusu MD   famotidine (PEPCID) 20 MG tablet Take 1 tablet by mouth 2 times daily for 14 days 9/8/20 1/15/22  Vivienne Owusu MD   folic acid (FOLVITE) 1 MG tablet Take 1 tablet by mouth daily 9/8/20 1/15/22  Brien De Los Santos MD        Allergies:     Doxycycline    Social History:     Tobacco:    reports that she has been smoking cigarettes. She has a 11.50 pack-year smoking history. She has never used smokeless tobacco.  Alcohol:      reports no history of alcohol use. Drug Use:  reports no history of drug use. Family History:     Family History   Problem Relation Age of Onset    Diabetes Mother     Mental Illness Mother     Substance Abuse Father        Review of Systems:     Positive and Negative as described in HPI. CONSTITUTIONAL:  negative for fevers, chills, sweats weight loss. Positive for fatigue  HEENT:  negative for vision, hearing changes, runny nose, throat pain  RESPIRATORY:  negative for shortness of breath, cough, congestion, wheezing  CARDIOVASCULAR:  negative for chest pain, palpitations  GASTROINTESTINAL:  negative for nausea, vomiting, diarrhea, constipation, change in bowel habits, abdominal pain   GENITOURINARY:  negative for difficulty of urination, burning with urination, frequency   INTEGUMENT:  negative for rash, skin lesions, easy bruising   HEMATOLOGIC/LYMPHATIC: Positive for swelling/edema   ALLERGIC/IMMUNOLOGIC:  negative for urticaria , itching  ENDOCRINE:  negative increase in drinking, increase in urination, hot or cold intolerance  MUSCULOSKELETAL:  negative joint pains, swelling of joints. Positive for muscle aches  NEUROLOGICAL:  negative for headaches, dizziness, lightheadedness, numbness, pain, tingling extremities  BEHAVIOR/PSYCH:  negative for depression, anxiety    Physical Exam:   /86   Pulse 83   Temp 97.8 °F (36.6 °C) (Oral)   Resp 16   Ht 5' 4\" (1.626 m)   Wt 200 lb 9.9 oz (91 kg)   LMP 2022   SpO2 95%   BMI 34.44 kg/m²   Temp (24hrs), Av °F (36.7 °C), Min:97.8 °F (36.6 °C), Max:98.2 °F (36.8 °C)    No results for input(s): POCGLU in the last 72 hours.     Intake/Output Summary (Last 24 hours) at 3/1/2022 1456  Last data filed at 3/1/2022 0600  Gross per 24 hour   Intake 1300 ml   Output 350 ml   Net 950 ml       General Appearance: alert, well appearing, and in no acute distress  Mental status: oriented to person, place, and time  Head: normocephalic, atraumatic  Eye: no icterus, redness, pupils equal and reactive, extraocular eye movements intact, conjunctiva clear  Ear: normal external ear, no discharge, hearing intact  Nose: no drainage noted  Mouth: mucous membranes moist  Neck: supple, no carotid bruits, thyroid not palpable  Lungs: Bilateral equal air entry, clear to ausculation, no wheezing, rales or rhonchi, normal effort  Cardiovascular: normal rate, regular rhythm, no murmur, gallop, rub  Abdomen: Soft, nontender, nondistended, normal bowel sounds, no hepatomegaly or splenomegaly  Neurologic: There are no new focal motor or sensory deficits, normal muscle tone and bulk, no abnormal sensation, normal speech, cranial nerves II through XII grossly intact  Skin: No gross lesions, rashes, bruising or bleeding on exposed skin area  Extremities: peripheral pulses palpable, no pedal edema or calf pain with palpation  Psych: normal affect    Investigations:      Laboratory Testing:  Recent Results (from the past 24 hour(s))   CBC with Auto Differential    Collection Time: 02/28/22 11:12 PM   Result Value Ref Range    WBC 13.8 (H) 3.5 - 11.3 k/uL    RBC 4.51 3.95 - 5.11 m/uL    Hemoglobin 13.1 11.9 - 15.1 g/dL    Hematocrit 38.7 36.3 - 47.1 %    MCV 85.8 82.6 - 102.9 fL    MCH 29.0 25.2 - 33.5 pg    MCHC 33.9 28.4 - 34.8 g/dL    RDW 14.9 (H) 11.8 - 14.4 %    Platelets See Reflexed IPF Result 138 - 453 k/uL    NRBC Automated 0.0 0.0 per 100 WBC    Immature Granulocytes 0 0 %    Seg Neutrophils 61 36 - 66 %    Lymphocytes 30 24 - 44 %    Monocytes 6 1 - 7 %    Eosinophils % 3 1 - 4 %    Basophils 0 0 - 2 %    Absolute Immature Granulocyte 0.00 0.00 - 0.30 k/uL    Segs Absolute 8.42 (H) 1.8 - 7.7 k/uL Absolute Lymph # 4.14 1.0 - 4.8 k/uL    Absolute Mono # 0.83 (H) 0.1 - 0.8 k/uL    Absolute Eos # 0.41 (H) 0.0 - 0.4 k/uL    Basophils Absolute 0.00 0.0 - 0.2 k/uL    Morphology ANISOCYTOSIS PRESENT     Morphology 1+ POLYCHROMASIA    Basic Metabolic Panel w/ Reflex to MG    Collection Time: 02/28/22 11:12 PM   Result Value Ref Range    Glucose 144 (H) 70 - 99 mg/dL    BUN 9 6 - 20 mg/dL    CREATININE 1.01 (H) 0.50 - 0.90 mg/dL    Calcium 9.5 8.6 - 10.4 mg/dL    Sodium 137 135 - 144 mmol/L    Potassium 3.5 (L) 3.7 - 5.3 mmol/L    Chloride 99 98 - 107 mmol/L    CO2 23 20 - 31 mmol/L    Anion Gap 15 9 - 17 mmol/L    GFR Non-African American 60 (L) >60 mL/min    GFR African American >60 >60 mL/min    GFR Comment         TSH    Collection Time: 02/28/22 11:12 PM   Result Value Ref Range    .81 (H) 0.30 - 5.00 mIU/L   T4, Free    Collection Time: 02/28/22 11:12 PM   Result Value Ref Range    Thyroxine, Free 0.22 (L) 0.93 - 1.70 ng/dL   CK    Collection Time: 02/28/22 11:12 PM   Result Value Ref Range    Total  (H) 26 - 192 U/L   MYOGLOBIN, SERUM    Collection Time: 02/28/22 11:12 PM   Result Value Ref Range    Myoglobin 67 (H) 25 - 58 ng/mL   HCG Qualitative, Serum    Collection Time: 02/28/22 11:12 PM   Result Value Ref Range    hCG Qual NEGATIVE NEGATIVE   Immature Platelet Fraction    Collection Time: 02/28/22 11:12 PM   Result Value Ref Range    Platelet, Fluorescence Platelet clumps present, count appears adequate.  138 - 453 k/uL   Magnesium    Collection Time: 02/28/22 11:12 PM   Result Value Ref Range    Magnesium 1.9 1.6 - 2.6 mg/dL   Urinalysis with Reflex to Culture    Collection Time: 03/01/22  3:16 AM    Specimen: Urine   Result Value Ref Range    Color, UA Yellow Yellow    Turbidity UA Clear Clear    Glucose, Ur NEGATIVE NEGATIVE    Bilirubin Urine NEGATIVE NEGATIVE    Ketones, Urine NEGATIVE NEGATIVE    Specific Gravity, UA 1.012 1.005 - 1.030    Urine Hgb NEGATIVE NEGATIVE    pH, UA 6.5 5.0 - 8.0    Protein, UA TRACE (A) NEGATIVE    Urobilinogen, Urine Normal Normal    Nitrite, Urine NEGATIVE NEGATIVE    Leukocyte Esterase, Urine NEGATIVE NEGATIVE   Microscopic Urinalysis    Collection Time: 03/01/22  3:16 AM   Result Value Ref Range    -          WBC, UA 2 TO 5 0 - 5 /HPF    RBC, UA 0 TO 2 0 - 4 /HPF    Epithelial Cells UA 0 TO 2 0 - 5 /HPF   LIPID PANEL    Collection Time: 03/01/22  9:08 AM   Result Value Ref Range    Cholesterol 278 (H) <200 mg/dL    HDL 45 >40 mg/dL    LDL Cholesterol 183 (H) 0 - 130 mg/dL    Chol/HDL Ratio 6.2 (H) <5    Triglycerides 249 (H) <150 mg/dL   CK    Collection Time: 03/01/22  9:08 AM   Result Value Ref Range    Total  (H) 26 - 192 U/L       Imaging/Diagnostics:  XR CHEST PORTABLE    Result Date: 2/26/2022  No acute process. 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  Will attempt to touch base with patient's endocrinologist to see if she would benefit from IV levothyroxine. We will recheck levels in 48 hours. Check a.m. cortisol tomorrow. Consultations:   IP CONSULT TO HOSPITALIST    Patient is admitted as inpatient status because of co-morbidities listed above, severity of signs and symptoms as outlined, requirement for current medical therapies and most importantly because of direct risk to patient if care not provided in a hospital setting. Expected length of stay > 48 hours.     Junito Gibson PA-C  3/1/2022  2:56 PM    Copy sent to Dr. Bc Calixto MD

## 2022-03-01 NOTE — ED PROVIDER NOTES
[facility]  ED  Emergency Department Encounter  Emergency Medicine Resident     Pt Name: Naina Juan  MRN: [id number]  [place] 1980  Date of evaluation: 2/28/22  PCP:  Ana Valenzuela MD    [address] [place] [address]       Chief Complaint   Patient presents with    Other     abnormal labs and full body swelling       HISTORY OFPRESENT ILLNESS  (Location/Symptom, Timing/Onset, Context/Setting, Quality, Duration, Modifying Factors,Severity.)      Naina Juan is a 35 y. o.yo female who presents with concern for thyroid issues. Patient was seen on the 26 due to concerns for swelling. Patient had extensive lab work done at that time which demonstrated elevated TSH and low T4. Patient has history of hypothyroidism on thyroid [place]. Patient states that she has had a difficult time controlling her hypothyroidism and sees an endocrinologist.  Patient does complain of diffuse body aches and muscle stiffness. Patient states that her endocrinologist saw her lab work from the 26 and requested that she return to the emergency department for admission to the hospital.  They did call in additional thyroid medication which she was able to  but has not started taking. Apparently her endocrinologist was unable to see her until the end of next week. Patient denies any other concerns or complaints    PAST MEDICAL / SURGICAL / SOCIAL / FAMILY HISTORY      has a past medical history of Carpal tunnel syndrome, Enlarged thyroid, H/O D&C (2001), H/O LEEP (1997), H/O SAB x 4, Incomplete miscarriage, Miscarriage, Psychiatric problem, Thyroid disease, and Thyroiditis. has a past surgical history that includes LEEP; Cholecystectomy; Dilation and curettage of uterus; Dilation and curettage of uterus (06/11/2017); pr induced abortn by dil/evac (N/A, 6/11/2017); and Thyroidectomy.      Social History     Socioeconomic History    Marital status:      Spouse name: Not on file    Number of children: Not on file    Years of education: Not on file    Highest education level: Not on file   Occupational History    Not on file   Tobacco Use    Smoking status: Current Every Day Smoker     Packs/day: 0.50     Years: 23.00     Pack years: 11.50     Types: Cigarettes    Smokeless tobacco: Never Used   Substance and Sexual Activity    Alcohol use: No    Drug use: No    Sexual activity: Not on file   Other Topics Concern    Not on file   Social History Narrative    Not on file     Social Determinants of Health     Financial Resource Strain:     Difficulty of Paying Living Expenses: Not on file   Food Insecurity:     Worried About Running Out of Food in the Last Year: Not on file    [place] of Food in the Last Year: Not on file   Transportation Needs:     Lack of Transportation (Medical): Not on file    Lack of Transportation (Non-Medical):  Not on file   Physical Activity:     Days of Exercise per Week: Not on file    Minutes of Exercise per Session: Not on file   Stress:     Feeling of Stress : Not on file   Social Connections:     Frequency of Communication with Friends and Family: Not on file    Frequency of Social Gatherings with Friends and Family: Not on file    Attends Mosque Services: Not on file    Active Member of [address] [place] [address] [employer] or Organizations: Not on file    Attends Club or Organization Meetings: Not on file    Marital Status: Not on file   Intimate Partner Violence:     Fear of Current or Ex-Partner: Not on file    Emotionally Abused: Not on file    Physically Abused: Not on file    Sexually Abused: Not on file   Housing Stability:     Unable to Pay for Housing in the Last Year: Not on file    Number of [place] in the Last Year: Not on file    Unstable Housing in the Last Year: Not on file       Family History   Problem Relation Age of Onset    Diabetes Mother     Mental Illness Mother     Substance Abuse Father         Allergies:  Doxycycline    Home Medications:  Prior to Admission medications    Medication Sig Start Date End Date Taking? Authorizing Provider   thyroid (ARMOUR THYROID) 180 MG tablet Take 180 mg by mouth daily    Historical Provider, MD   ibuprofen (ADVIL;MOTRIN) 600 MG tablet Take 1 tablet by mouth every 8 hours as needed for Pain 9/17/21 1/15/22  Theresa Ugalde MD   cholestyramine light 4 g packet Take 1 packet by mouth 2 times daily for 7 days 9/8/20 1/15/22  Miguel Ramos MD   famotidine (PEPCID) 20 MG tablet Take 1 tablet by mouth 2 times daily for 14 days 9/8/20 1/15/22  Miguel Ramos MD   folic acid (FOLVITE) 1 MG tablet Take 1 tablet by mouth daily 9/8/20 1/15/22  Miguel Ramos MD       REVIEW OFSYSTEMS    (2-9 systems for level 4, 10 or more for level 5)      Review of Systems   Constitutional: Positive for activity change and fatigue. Negative for chills, diaphoresis and fever. HENT: Negative for congestion, sinus pressure, sinus pain, sore throat and trouble swallowing. Eyes: Negative for photophobia, redness and visual disturbance. Respiratory: Negative for cough, chest tightness, shortness of breath and wheezing. Cardiovascular: Negative for chest pain, palpitations and leg swelling. Gastrointestinal: Positive for nausea. Negative for abdominal pain, constipation, diarrhea and vomiting. Genitourinary: Negative for difficulty urinating, dysuria, flank pain and urgency. Musculoskeletal: Positive for arthralgias, back pain and myalgias. Negative for neck pain and neck stiffness. Skin: Negative for color change, pallor and rash. Neurological: Positive for weakness and headaches. Negative for dizziness, tremors, seizures, speech difficulty and light-headedness.        PHYSICAL EXAM   (up to 7 for level 4, 8 or more forlevel 5)      INITIAL VITALS:   ED Triage Vitals [02/28/22 2232]   BP Temp Temp Source Pulse Resp SpO2 Height Weight   (!) 158/92 98.2 °F (36.8 °C) Oral 90 18 96 % 5' 4\" (1.626 m) 196 lb tablet 20 mEq       DDX: fibromyalgia, hypothyroidism, electrolyte abnormality, dehydration, rhabdomyolysis    Initial MDM/Plan: 35 y.o. female who presents with concern for abnormal lab work. Patient was seen 2 days ago has history of hypothyroidism. Lab work does show that she is hypothyroid on her medication. Reportedly told to come to the emergency department for admission by her endocrinologist.  Will repeat thyroid labs today. DIAGNOSTIC RESULTS / EMERGENCYDEPARTMENT COURSE / MDM     LABS:  Labs Reviewed   CBC WITH AUTO DIFFERENTIAL - Abnormal; Notable for the following components:       Result Value    WBC 13.8 (*)     RDW 14.9 (*)     Segs Absolute 8.42 (*)     Absolute Mono # 0.83 (*)     Absolute Eos # 0.41 (*)     All other components within normal limits   BASIC METABOLIC PANEL W/ REFLEX TO MG FOR LOW K - Abnormal; Notable for the following components:    Glucose 144 (*)     CREATININE 1.01 (*)     Potassium 3.5 (*)     GFR Non- 60 (*)     All other components within normal limits   T4, FREE - Abnormal; Notable for the following components:    Thyroxine, Free 0.22 (*)     All other components within normal limits   CK - Abnormal; Notable for the following components: Total  (*)     All other components within normal limits   MYOGLOBIN, SERUM - Abnormal; Notable for the following components:    Myoglobin 67 (*)     All other components within normal limits   HCG, SERUM, QUALITATIVE   IMMATURE PLATELET FRACTION   MAGNESIUM   URINALYSIS WITH REFLEX TO CULTURE   TSH         RADIOLOGY:  No results found.       EKG      All EKG's are interpreted by the Emergency Department Physicianwho either signs or Co-signs this chart in the absence of a cardiologist.    EMERGENCY DEPARTMENT COURSE:  ED Course as of 03/01/22 0103   Mon Feb 28, 2022   2332 WBC(!): 13.8 [CD]   Tue Mar 01, 2022   0010 Total CK(!): 294  Slightly increased from 2 days previous  [CD]   0010 Myoglobin(!): 67 [CD] 0011 Thyroxine, Free(!): 0.22 [CD]   0034 Given patient's presentation and reports that she was told to come in for admission to manage her thyroid will reach out to [facility]. [CD]   7199 Patient to be admitted to [facility] for management of her hypothyroidism. [CD]      ED Course User Index  [CD] Leopold Drown, DO          PROCEDURES:  None    CONSULTS:  IP CONSULT TO HOSPITALIST    CRITICAL CARE:  Please see attached documentation    FINAL IMPRESSION      1. Hypothyroidism, unspecified type          DISPOSITION / PLAN     DISPOSITION Decision To Admit 03/01/2022 12:18:06 AM      PATIENT REFERRED TO:  No follow-up provider specified.     DISCHARGE MEDICATIONS:  New Prescriptions    No medications on file       Leopold Drown, DO  Emergency Medicine Resident    (Please note that portions of this note were completed with a voice recognition program.Efforts were made to edit the dictations but occasionally words are mis-transcribed.)        Leopold Drown, DO  Resident  03/01/22 8142

## 2022-03-01 NOTE — PLAN OF CARE
Problem:  Activity:  Goal: Capacity to carry out activities will improve  Description: Capacity to carry out activities will improve  3/1/2022 1701 by Linda Summers RN  Outcome: Ongoing  3/1/2022 0503 by Marielle Leon RN  Outcome: Ongoing  Goal: Fatigue will decrease  Description: Fatigue will decrease  3/1/2022 1701 by Linda Summers RN  Outcome: Ongoing  3/1/2022 0503 by Marielle Leon RN  Outcome: Ongoing  Goal: Energy level will increase  Description: Energy level will increase  3/1/2022 1701 by Linda Summers RN  Outcome: Ongoing  3/1/2022 0503 by Marielle Leon RN  Outcome: Ongoing  Goal: Ability to implement measures to reduce episodes of fatigue will improve  Description: Ability to implement measures to reduce episodes of fatigue will improve  3/1/2022 1701 by Linda Summers RN  Outcome: Ongoing  3/1/2022 0503 by Marielle Leon RN  Outcome: Ongoing     Problem: Coping:  Goal: Ability to identify and develop effective coping behavior will improve  Description: Ability to identify and develop effective coping behavior will improve  3/1/2022 1701 by Linda Summers RN  Outcome: Ongoing  3/1/2022 0503 by Marielle Leon RN  Outcome: Ongoing  Goal: Ability to identify and utilize available resources and services will improve  Description: Ability to identify and utilize available resources and services will improve  3/1/2022 1701 by Linda Summers RN  Outcome: Ongoing  3/1/2022 0503 by Marielle Leon RN  Outcome: Ongoing     Problem: Pain:  Goal: Pain level will decrease  Description: Pain level will decrease  3/1/2022 1701 by Linda Summers RN  Outcome: Ongoing  3/1/2022 0503 by Marielle Leon RN  Outcome: Ongoing  Goal: Control of acute pain  Description: Control of acute pain  3/1/2022 1701 by Linda Summers RN  Outcome: Ongoing  3/1/2022 0503 by Marielle Leon RN  Outcome: Ongoing  Goal: Control of chronic pain  Description: Control of chronic pain  3/1/2022 1701 by Linda Summers RN  Outcome: Ongoing  3/1/2022 0503 by Marielle Leon RN  Outcome: Ongoing

## 2022-03-01 NOTE — PROGRESS NOTES
Physical Therapy        Physical Therapy Cancel Note      DATE: 3/1/2022    NAME: Alejandrina Gibson  MRN: [id number]   : 1980      Patient not seen this date for Physical Therapy due to:    Patient independent with functional mobility. Will defer PT evaluation at this time. Please reorder PT if future needs arise. Pt up ad wade throughout facility without AD, no DME needs, pt report good family support. Nurse notified.        Electronically signed by Valery Navarro PT on 3/1/2022 at 2:15 PM

## 2022-03-01 NOTE — ED PROVIDER NOTES
[facility]  ED     Emergency Department     Faculty Attestation    I performed a history and physical examination of the patient and discussed management with the resident. I reviewed the residents note and agree with the documented findings and plan of care. Any areas of disagreement are noted on the chart. I was personally present for the key portions of any procedures. I have documented in the chart those procedures where I was not present during the key portions. I have reviewed the emergency nurses triage note. I agree with the chief complaint, past medical history, past surgical history, allergies, medications, social and family history as documented unless otherwise noted below. For Physician Assistant/ Nurse Practitioner cases/documentation I have personally evaluated this patient and have completed at least one if not all key elements of the E/M (history, physical exam, and MDM). Additional findings are as noted. Patient presents after she received a call from her endocrinologist today telling her to come to the emergency department for admission. Patient was seen here 2 days ago for generalized swelling. Patient does have a history of thyroidectomy and is on [place] thyroid. She says she has been taking her medication but says she feels like she does when she is needed to be admitted for hypothyroidism in the past.  Her last admission was in July. She denies any recent fever, chest pain, shortness of breath, abdominal pain, nausea or vomiting. She says she does have muscle aches as well as generalized swelling and fatigue. We will repeat labs today and plan to admit patient.       Snow Ross MD  Attending Emergency  Physician              Denny Britton MD  02/28/22 3117

## 2022-03-01 NOTE — PLAN OF CARE
Problem:  Activity:  Goal: Capacity to carry out activities will improve  Description: Capacity to carry out activities will improve  Outcome: Ongoing  Goal: Fatigue will decrease  Description: Fatigue will decrease  Outcome: Ongoing  Goal: Energy level will increase  Description: Energy level will increase  Outcome: Ongoing  Goal: Ability to implement measures to reduce episodes of fatigue will improve  Description: Ability to implement measures to reduce episodes of fatigue will improve  Outcome: Ongoing     Problem: Coping:  Goal: Ability to identify and develop effective coping behavior will improve  Description: Ability to identify and develop effective coping behavior will improve  Outcome: Ongoing  Goal: Ability to identify and utilize available resources and services will improve  Description: Ability to identify and utilize available resources and services will improve  Outcome: Ongoing     Problem: Pain:  Goal: Pain level will decrease  Description: Pain level will decrease  Outcome: Ongoing  Goal: Control of acute pain  Description: Control of acute pain  Outcome: Ongoing  Goal: Control of chronic pain  Description: Control of chronic pain  Outcome: Ongoing

## 2022-03-01 NOTE — CARE COORDINATION
Case Management Initial Discharge Plan  Cherrie Maki,             Met with:patient to discuss discharge plans. Information verified: address, contacts, phone number, , insurance Yes  Insurance Provider: [employer]    Emergency Contact/Next of Kin name & number: Spouse Hever Fofana as per face sheet verified  Who are involved in patient's support system? spouse    PCP: Jacob Felipe MD  Date of last visit: august      Discharge Planning    Living Arrangements:  Spouse/Significant Other     Home has 1 stories  5-6 stairs to climb to get into front door, no stairs to climb to reach second floor  Location of bedroom/bathroom in home main    Patient able to perform ADL's:Independent    Current Services (outpatient & in home) DME  DME equipment: none  DME provider: na    Is patient receiving oral anticoagulation therapy? No    If indicated:   Physician managing anticoagulation treatment: na  Where does patient obtain lab work for ATC treatment? na      Potential Assistance Needed:  N/A    Patient agreeable to home care: No  [place] of choice provided:  n/a    Prior SNF/Rehab Placement and Facility: none  Agreeable to SNF/Rehab: No  [place] of choice provided: n/a     Evaluation: n/a    Expected Discharge date:  22    Patient expects to be discharged to:   home    If home: is the family and/or caregiver wiling & able to provide support at home? yes  Who will be providing this support? spouse    Follow Up Appointment: Best Day/ Time: Monday AM    Transportation provider: spouse  Transportation arrangements needed for discharge: No    Readmission Risk              Risk of Unplanned Readmission:  12             Does patient have a readmission risk score greater than 14?: No  If yes, follow-up appointment must be made within 7 days of discharge.      Goals of Care: self care      Educated pt on transitional options, provided freedom of choice and are agreeable with plan      Discharge Plan: home independent, no needs identified, has transportation    Electronically signed by Eliza Jackson RN on 3/1/22 at 10:48 AM EST

## 2022-03-01 NOTE — ED PROVIDER NOTES
Faculty Sign-Out Attestation  Handoff taken on the following patient from prior Attending Physician: Joanie Perrin    I was available and discussed any additional care issues that arose and coordinated the management plans with the resident(s) caring for the patient during my duty period. Any areas of disagreement with residents documentation of care or procedures are noted on the chart. I was personally present for the key portions of any/all procedures during my duty period. I have documented in the chart those procedures where I was not present during the key portions.     Admitted, hx low T4, vss    Melly Patten DO  Attending Physician     Melly Patten DO  03/01/22 8914

## 2022-03-02 LAB
ABSOLUTE EOS #: 0.33 K/UL (ref 0–0.44)
ABSOLUTE IMMATURE GRANULOCYTE: 0.08 K/UL (ref 0–0.3)
ABSOLUTE LYMPH #: 3.71 K/UL (ref 1.1–3.7)
ABSOLUTE MONO #: 0.47 K/UL (ref 0.1–1.2)
ANION GAP SERPL CALCULATED.3IONS-SCNC: 11 MMOL/L (ref 9–17)
BASOPHILS # BLD: 1 % (ref 0–2)
BASOPHILS ABSOLUTE: 0.14 K/UL (ref 0–0.2)
BUN BLDV-MCNC: 9 MG/DL (ref 6–20)
CALCIUM SERPL-MCNC: 8.6 MG/DL (ref 8.6–10.4)
CHLORIDE BLD-SCNC: 101 MMOL/L (ref 98–107)
CO2: 23 MMOL/L (ref 20–31)
CORTISOL: 16.4 UG/DL (ref 2.7–18.4)
CREAT SERPL-MCNC: 0.79 MG/DL (ref 0.5–0.9)
EOSINOPHILS RELATIVE PERCENT: 3 % (ref 1–4)
GFR AFRICAN AMERICAN: >60 ML/MIN
GFR NON-AFRICAN AMERICAN: >60 ML/MIN
GFR SERPL CREATININE-BSD FRML MDRD: ABNORMAL ML/MIN/{1.73_M2}
GLUCOSE BLD-MCNC: 102 MG/DL (ref 70–99)
HCT VFR BLD CALC: 40.5 % (ref 36.3–47.1)
HEMOGLOBIN: 13 G/DL (ref 11.9–15.1)
IMMATURE GRANULOCYTES: 1 %
INR BLD: 0.9
LYMPHOCYTES # BLD: 36 % (ref 24–43)
MCH RBC QN AUTO: 29.3 PG (ref 25.2–33.5)
MCHC RBC AUTO-ENTMCNC: 32.1 G/DL (ref 28.4–34.8)
MCV RBC AUTO: 91.2 FL (ref 82.6–102.9)
MONOCYTES # BLD: 5 % (ref 3–12)
NRBC AUTOMATED: 0 PER 100 WBC
PARTIAL THROMBOPLASTIN TIME: 23.4 SEC (ref 20.5–30.5)
PDW BLD-RTO: 15.5 % (ref 11.8–14.4)
PLATELET # BLD: 263 K/UL (ref 138–453)
PMV BLD AUTO: 12.2 FL (ref 8.1–13.5)
POTASSIUM SERPL-SCNC: 4.2 MMOL/L (ref 3.7–5.3)
PROTHROMBIN TIME: 10 SEC (ref 9.1–12.3)
RBC # BLD: 4.44 M/UL (ref 3.95–5.11)
RBC # BLD: ABNORMAL 10*6/UL
SEG NEUTROPHILS: 54 % (ref 36–65)
SEGMENTED NEUTROPHILS ABSOLUTE COUNT: 5.53 K/UL (ref 1.5–8.1)
SODIUM BLD-SCNC: 135 MMOL/L (ref 135–144)
WBC # BLD: 10.3 K/UL (ref 3.5–11.3)

## 2022-03-02 PROCEDURE — 2500000003 HC RX 250 WO HCPCS: Performed by: PHYSICIAN ASSISTANT

## 2022-03-02 PROCEDURE — 6370000000 HC RX 637 (ALT 250 FOR IP): Performed by: NURSE PRACTITIONER

## 2022-03-02 PROCEDURE — 96376 TX/PRO/DX INJ SAME DRUG ADON: CPT

## 2022-03-02 PROCEDURE — 6370000000 HC RX 637 (ALT 250 FOR IP): Performed by: PHYSICIAN ASSISTANT

## 2022-03-02 PROCEDURE — 82533 TOTAL CORTISOL: CPT

## 2022-03-02 PROCEDURE — 2580000003 HC RX 258: Performed by: PHYSICIAN ASSISTANT

## 2022-03-02 PROCEDURE — 36415 COLL VENOUS BLD VENIPUNCTURE: CPT

## 2022-03-02 PROCEDURE — 1200000000 HC SEMI PRIVATE

## 2022-03-02 PROCEDURE — 96361 HYDRATE IV INFUSION ADD-ON: CPT

## 2022-03-02 PROCEDURE — G0378 HOSPITAL OBSERVATION PER HR: HCPCS

## 2022-03-02 PROCEDURE — 85730 THROMBOPLASTIN TIME PARTIAL: CPT

## 2022-03-02 PROCEDURE — 99232 SBSQ HOSP IP/OBS MODERATE 35: CPT | Performed by: PHYSICIAN ASSISTANT

## 2022-03-02 PROCEDURE — 80048 BASIC METABOLIC PNL TOTAL CA: CPT

## 2022-03-02 PROCEDURE — 85610 PROTHROMBIN TIME: CPT

## 2022-03-02 PROCEDURE — 85025 COMPLETE CBC W/AUTO DIFF WBC: CPT

## 2022-03-02 RX ORDER — LEVOTHYROXINE SODIUM ANHYDROUS 200 UG/5ML
100 INJECTION, POWDER, LYOPHILIZED, FOR SOLUTION INTRAVENOUS ONCE
Status: COMPLETED | OUTPATIENT
Start: 2022-03-02 | End: 2022-03-02

## 2022-03-02 RX ADMIN — ACETAMINOPHEN 650 MG: 325 TABLET ORAL at 06:18

## 2022-03-02 RX ADMIN — LEVOTHYROXINE SODIUM ANHYDROUS 100 MCG: 200 INJECTION, POWDER, LYOPHILIZED, FOR SOLUTION INTRAVENOUS at 15:09

## 2022-03-02 RX ADMIN — SODIUM CHLORIDE: 9 INJECTION, SOLUTION INTRAVENOUS at 06:16

## 2022-03-02 RX ADMIN — PREGABALIN 100 MG: 100 CAPSULE ORAL at 09:29

## 2022-03-02 RX ADMIN — PREGABALIN 100 MG: 100 CAPSULE ORAL at 15:09

## 2022-03-02 RX ADMIN — LIOTHYRONINE SODIUM 25 MCG: 25 TABLET ORAL at 09:29

## 2022-03-02 RX ADMIN — ACETAMINOPHEN 650 MG: 325 TABLET ORAL at 18:56

## 2022-03-02 RX ADMIN — THYROID, PORCINE 240 MG: 180 TABLET ORAL at 09:29

## 2022-03-02 RX ADMIN — PREGABALIN 100 MG: 100 CAPSULE ORAL at 20:26

## 2022-03-02 RX ADMIN — LEVOTHYROXINE SODIUM 50 MCG: 50 TABLET ORAL at 06:15

## 2022-03-02 ASSESSMENT — PAIN DESCRIPTION - PAIN TYPE: TYPE: ACUTE PAIN

## 2022-03-02 ASSESSMENT — PAIN SCALES - GENERAL
PAINLEVEL_OUTOF10: 3
PAINLEVEL_OUTOF10: 3
PAINLEVEL_OUTOF10: 8

## 2022-03-02 ASSESSMENT — PAIN DESCRIPTION - LOCATION: LOCATION: GENERALIZED

## 2022-03-02 NOTE — PLAN OF CARE
Problem:  Activity:  Goal: Capacity to carry out activities will improve  Description: Capacity to carry out activities will improve  3/2/2022 1727 by Karlene Dandy, RN  Outcome: Ongoing  3/2/2022 0533 by Troy Rose RN  Outcome: Ongoing  Goal: Fatigue will decrease  Description: Fatigue will decrease  3/2/2022 1727 by Karlene Dandy, RN  Outcome: Ongoing  3/2/2022 0533 by Troy Rose RN  Outcome: Ongoing  Goal: Energy level will increase  Description: Energy level will increase  3/2/2022 1727 by Karlene Dandy, RN  Outcome: Ongoing  3/2/2022 0533 by Troy Rose RN  Outcome: Ongoing  Goal: Ability to implement measures to reduce episodes of fatigue will improve  Description: Ability to implement measures to reduce episodes of fatigue will improve  3/2/2022 1727 by Karlene Dandy, RN  Outcome: Ongoing  3/2/2022 0533 by Troy Rose RN  Outcome: Ongoing     Problem: Coping:  Goal: Ability to identify and develop effective coping behavior will improve  Description: Ability to identify and develop effective coping behavior will improve  3/2/2022 1727 by Karlene Dandy, RN  Outcome: Ongoing  3/2/2022 0533 by Troy Rose RN  Outcome: Ongoing  Goal: Ability to identify and utilize available resources and services will improve  Description: Ability to identify and utilize available resources and services will improve  3/2/2022 1727 by Karlene Dandy, RN  Outcome: Ongoing  3/2/2022 0533 by Troy Rose RN  Outcome: Ongoing     Problem: Pain:  Goal: Pain level will decrease  Description: Pain level will decrease  3/2/2022 1727 by Karlene Dandy, RN  Outcome: Ongoing  3/2/2022 0533 by Troy Rose RN  Outcome: Ongoing  Goal: Control of acute pain  Description: Control of acute pain  3/2/2022 1727 by Karlene Dandy, RN  Outcome: Ongoing  3/2/2022 0533 by Troy Rose RN  Outcome: Ongoing  Goal: Control of chronic pain  Description: Control of chronic pain  3/2/2022 1727 by Karlene Dandy, RN  Outcome: Ongoing  3/2/2022 0533 by Augie Garcia RN  Outcome: Ongoing

## 2022-03-02 NOTE — PLAN OF CARE
Problem:  Activity:  Goal: Capacity to carry out activities will improve  Description: Capacity to carry out activities will improve  3/2/2022 0533 by Joseph Menjivar RN  Outcome: Ongoing  3/1/2022 1701 by Queen Gustavo RN  Outcome: Ongoing  Goal: Fatigue will decrease  Description: Fatigue will decrease  3/2/2022 0533 by Joseph Menjivar RN  Outcome: Ongoing  3/1/2022 1701 by Queen Gustavo RN  Outcome: Ongoing  Goal: Energy level will increase  Description: Energy level will increase  3/2/2022 0533 by Joseph Menjivar RN  Outcome: Ongoing  3/1/2022 1701 by Queen Gustavo RN  Outcome: Ongoing  Goal: Ability to implement measures to reduce episodes of fatigue will improve  Description: Ability to implement measures to reduce episodes of fatigue will improve  3/2/2022 0533 by Joseph Menjivar RN  Outcome: Ongoing  3/1/2022 1701 by Queen Gustavo RN  Outcome: Ongoing     Problem: Coping:  Goal: Ability to identify and develop effective coping behavior will improve  Description: Ability to identify and develop effective coping behavior will improve  3/2/2022 0533 by Joseph Menjivar RN  Outcome: Ongoing  3/1/2022 1701 by Queen Gustavo RN  Outcome: Ongoing  Goal: Ability to identify and utilize available resources and services will improve  Description: Ability to identify and utilize available resources and services will improve  3/2/2022 0533 by Joseph Menjivar RN  Outcome: Ongoing  3/1/2022 1701 by Queen Gustavo RN  Outcome: Ongoing     Problem: Pain:  Goal: Pain level will decrease  Description: Pain level will decrease  3/2/2022 0533 by Joseph Menjivar RN  Outcome: Ongoing  3/1/2022 1701 by Queen Gustavo RN  Outcome: Ongoing  Goal: Control of acute pain  Description: Control of acute pain  3/2/2022 0533 by Joseph Menjivar RN  Outcome: Ongoing  3/1/2022 1701 by Queen Gustavo RN  Outcome: Ongoing  Goal: Control of chronic pain  Description: Control of chronic pain  3/2/2022 0533 by Joseph Menjivar RN  Outcome: Ongoing  3/1/2022 1701 by Queen Gustavo RN  Outcome: Ongoing

## 2022-03-02 NOTE — PROGRESS NOTES
[facility]  Office: [address], DO, Nelidadmitriy Melton, DO, Mario Horowitz, DO, Helga Torres, DO, Jory Geronimo MD, Luciana Whiting MD, Lilia Hernandez MD, Bethel Sands MD, Daphnie Leal MD, Monserrat Coleman MD, Sarah Miller MD, Naheed Mendez, DO, Yoana Vega, DO, Mercy Harmon MD,  Feliciano Hillman, DO, Vertell Moritz, MD, Tracy Saravia MD, Susan Mosher MD, Rashida Porras MD, Bart Mendez MD, Wendy Louis, CNP, [employer], CNP, Juanito Snyder, CNP, Juanita Grant, CNS, Angelica Appl, CNP, Paul Celis, CNP, Shaq Dwyer, CNP, Harris Oswald, CNP, Alva Medrano, CNP, Alesia Musa PA-C, Yossi Chavira, DNP, David Spears, DNP, Reynolds Leanna, CNP, Shantelle Davis, CNP, Dawayne Oppenheim, CNP, Penny Balderas, CNP, Denise Campos, CNP, [place], [address] [place] [address]    Progress Note    3/2/2022    1:17 PM    Name:   Jovita Castle  MRN:     [id number]     Acct:      [de-identified]   Room:   [address] [place] [address] Day:  1  Admit Date:  2/28/2022 10:35 PM    PCP:   Rudi Harmon MD  Code Status:  Full Code    Subjective:     C/C:   Chief Complaint   Patient presents with    Other     abnormal labs and full body swelling     Interval History Status: not changed. Pt seen and evaluated this morning. She is not feeling any better. Continues to complain of swelling in her extremities, muscle rigidity/pain. Otherwise, she has no new complaints. Brief History:     Jovita Castle is a 35 y.o. female past medical history significant for hypothyroidism. Patient reports that she has had extensive difficulty with her thyroid levels in the past.  She has been on multiple different thyroid medications. Currently she is on [place] Thyroid.   Her dose was increased from 180 to 240 mg approximately 2 weeks ago as she was having worsening symptoms including lower extremity swelling, muscle stiffness/rigidity, hair loss, cold intolerance, fatigue. After increasing her thyroid dose, she has not noticed any significant improvement. Her endocrinologist rechecked her thyroid labs and noted her TSH to be very elevated and T4 very low prompting her admission to the hospital.  Currently, she is complaining of pain and swelling in her lower extremities. She states that her muscles feel very tight and sore. 3/1: received 100 mcg IV levothyroxine    3/2: received additional 100 mcg of IV levothyroxine    Review of Systems:     Constitutional:  negative for chills, fevers, sweats  Respiratory:  negative for cough, dyspnea on exertion, shortness of breath, wheezing  Cardiovascular:  negative for chest pain, chest pressure/discomfort, lower extremity edema, palpitations  Gastrointestinal:  negative for abdominal pain, constipation, diarrhea, nausea, vomiting  Neurological:  negative for dizziness, headache    Medications: Allergies: Allergies   Allergen Reactions    Doxycycline Anaphylaxis       Current Meds:   Scheduled Meds:    sodium chloride flush  5-40 mL IntraVENous 2 times per day    enoxaparin  40 mg SubCUTAneous Daily    levothyroxine  50 mcg Oral Daily    liothyronine  25 mcg Oral Daily    thyroid  240 mg Oral Daily    pregabalin  100 mg Oral TID     Continuous Infusions:    sodium chloride      sodium chloride 75 mL/hr at 03/02/22 0616     PRN Meds: sodium chloride flush, sodium chloride, potassium chloride **OR** potassium alternative oral replacement **OR** potassium chloride, magnesium sulfate, ondansetron **OR** ondansetron, polyethylene glycol, acetaminophen **OR** acetaminophen    Data:     Past Medical History:   has a past medical history of Carpal tunnel syndrome, Enlarged thyroid, H/O D&C (2001), H/O LEEP (1997), H/O SAB x 4, Incomplete miscarriage, Miscarriage, Psychiatric problem, Thyroid disease, and Thyroiditis. Social History:   reports that she has been smoking cigarettes.  She has a 11.50 pack-year smoking history. She has never used smokeless tobacco. She reports that she does not drink alcohol and does not use drugs. Family History:   Family History   Problem Relation Age of Onset    Diabetes Mother     Mental Illness Mother     Substance Abuse Father        Vitals:  /87   Pulse 86   Temp 98.6 °F (37 °C) (Oral)   Resp 17   Ht 5' 4\" (1.626 m)   Wt 200 lb 9.9 oz (91 kg)   LMP 2022   SpO2 98%   BMI 34.44 kg/m²   Temp (24hrs), Av.5 °F (36.9 °C), Min:98.4 °F (36.9 °C), Max:98.6 °F (37 °C)    No results for input(s): POCGLU in the last 72 hours. I/O (24Hr):     Intake/Output Summary (Last 24 hours) at 3/2/2022 1317  Last data filed at 3/2/2022 0616  Gross per 24 hour   Intake 1458 ml   Output 350 ml   Net 1108 ml       Labs:  Hematology:  Recent Labs     22  0530   WBC 13.8* 10.3   RBC 4.51 4.44   HGB 13.1 13.0   HCT 38.7 40.5   MCV 85.8 91.2   MCH 29.0 29.3   MCHC 33.9 32.1   RDW 14.9* 15.5*   PLT See Reflexed IPF Result 263   MPV  --  12.2   INR  --  0.9     Chemistry:  Recent Labs     22  0908 22  0530     --  135   K 3.5*  --  4.2   CL 99  --  101   CO2 23  --  23   GLUCOSE 144*  --  102*   BUN 9  --  9   CREATININE 1.01*  --  0.79   MG 1.9  --   --    ANIONGAP 15  --  11   LABGLOM 60*  --  >60   GFRAA >60  --  >60   CALCIUM 9.5  --  8.6   CKTOTAL 294* 226*  --    MYOGLOBIN 67*  --   --      Recent Labs     22  0908   .81*  --    CHOL  --  278*   HDL  --  45   LDLCHOLESTEROL  --  183*   CHOLHDLRATIO  --  6.2*   TRIG  --  249*     ABG:  Lab Results   Component Value Date    FIO2 INFORMATION NOT PROVIDED 2021     Lab Results   Component Value Date/Time    SPECIAL NOT REPORTED 2020 11:06 AM     Lab Results   Component Value Date/Time    CULTURE NO SIGNIFICANT GROWTH 2012 01:21 AM    CULTURE  2012 01:21 AM     Performed at [facility] [address] [facility], [facility] [phone]       Radiology:  XR CHEST PORTABLE    Result Date: 2/26/2022  No acute process. Physical Examination:        General appearance:  alert, cooperative and no distress  Mental Status:  oriented to person, place and time and normal affect  Lungs:  clear to auscultation bilaterally, normal effort  Heart:  regular rate and rhythm, no murmur  Abdomen:  soft, nontender, nondistended, normal bowel sounds, no masses, hepatomegaly, splenomegaly  Extremities:  no edema, redness, tenderness in the calves  Skin:  no gross lesions, rashes, induration    Assessment:        Hospital Problems           Last Modified POA    * (Principal) Severe hypothyroidism 3/1/2022 Yes    H/O total thyroidectomy 3/1/2022 Yes    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  Discussed case with pt endocrinologist, Dr. Narinder Sultana.  Will administer additional dose of 100 mcg levothyroxine today    Libby Reynolds PA-C  3/2/2022  1:17 PM

## 2022-03-02 NOTE — CARE COORDINATION
Transitional planning:  Nurse rounds: Having hypothyroidism issues. Had thyroid removed 3 years ago. Started on new medication that was recalled. Possible Discharge tomorrow.

## 2022-03-03 VITALS
WEIGHT: 198.9 LBS | OXYGEN SATURATION: 93 % | TEMPERATURE: 98.3 F | HEIGHT: 64 IN | BODY MASS INDEX: 33.96 KG/M2 | RESPIRATION RATE: 18 BRPM | SYSTOLIC BLOOD PRESSURE: 148 MMHG | DIASTOLIC BLOOD PRESSURE: 94 MMHG | HEART RATE: 89 BPM

## 2022-03-03 LAB
T3 FREE: 4.01 PG/ML (ref 2.02–4.43)
THYROXINE, FREE: 0.63 NG/DL (ref 0.93–1.7)
TSH SERPL DL<=0.05 MIU/L-ACNC: 23.61 MIU/L (ref 0.3–5)

## 2022-03-03 PROCEDURE — 96361 HYDRATE IV INFUSION ADD-ON: CPT

## 2022-03-03 PROCEDURE — 84439 ASSAY OF FREE THYROXINE: CPT

## 2022-03-03 PROCEDURE — 36415 COLL VENOUS BLD VENIPUNCTURE: CPT

## 2022-03-03 PROCEDURE — G0378 HOSPITAL OBSERVATION PER HR: HCPCS

## 2022-03-03 PROCEDURE — 99239 HOSP IP/OBS DSCHRG MGMT >30: CPT | Performed by: PHYSICIAN ASSISTANT

## 2022-03-03 PROCEDURE — 84443 ASSAY THYROID STIM HORMONE: CPT

## 2022-03-03 PROCEDURE — 96376 TX/PRO/DX INJ SAME DRUG ADON: CPT

## 2022-03-03 PROCEDURE — 6370000000 HC RX 637 (ALT 250 FOR IP): Performed by: PHYSICIAN ASSISTANT

## 2022-03-03 PROCEDURE — 2500000003 HC RX 250 WO HCPCS: Performed by: PHYSICIAN ASSISTANT

## 2022-03-03 PROCEDURE — 6370000000 HC RX 637 (ALT 250 FOR IP): Performed by: NURSE PRACTITIONER

## 2022-03-03 PROCEDURE — 84481 FREE ASSAY (FT-3): CPT

## 2022-03-03 RX ORDER — LEVOTHYROXINE SODIUM ANHYDROUS 200 UG/5ML
100 INJECTION, POWDER, LYOPHILIZED, FOR SOLUTION INTRAVENOUS ONCE
Status: COMPLETED | OUTPATIENT
Start: 2022-03-03 | End: 2022-03-03

## 2022-03-03 RX ADMIN — LEVOTHYROXINE SODIUM 50 MCG: 50 TABLET ORAL at 06:35

## 2022-03-03 RX ADMIN — PREGABALIN 100 MG: 100 CAPSULE ORAL at 08:11

## 2022-03-03 RX ADMIN — LIOTHYRONINE SODIUM 25 MCG: 25 TABLET ORAL at 08:11

## 2022-03-03 RX ADMIN — LEVOTHYROXINE SODIUM ANHYDROUS 100 MCG: 200 INJECTION, POWDER, LYOPHILIZED, FOR SOLUTION INTRAVENOUS at 10:54

## 2022-03-03 RX ADMIN — THYROID, PORCINE 240 MG: 180 TABLET ORAL at 08:11

## 2022-03-03 NOTE — PLAN OF CARE
Problem:  Activity:  Goal: Capacity to carry out activities will improve  Description: Capacity to carry out activities will improve  3/3/2022 0551 by Monique Altamirano RN  Outcome: Ongoing  3/3/2022 0157 by Monique Altamirano RN  Outcome: Ongoing  3/2/2022 1727 by Kamla Marrero RN  Outcome: Ongoing  Goal: Fatigue will decrease  Description: Fatigue will decrease  3/3/2022 0551 by Monique Altamirano RN  Outcome: Ongoing  3/3/2022 0157 by Monique Altamirano RN  Outcome: Ongoing  3/2/2022 1727 by Kamla Marrero RN  Outcome: Ongoing  Goal: Energy level will increase  Description: Energy level will increase  3/3/2022 0551 by Monique Altamirano RN  Outcome: Ongoing  3/3/2022 0157 by Monique Altamirano RN  Outcome: Ongoing  3/2/2022 1727 by Kamla Marrero RN  Outcome: Ongoing  Goal: Ability to implement measures to reduce episodes of fatigue will improve  Description: Ability to implement measures to reduce episodes of fatigue will improve  3/3/2022 0551 by Monique Altamirano RN  Outcome: Ongoing  3/3/2022 0157 by Monique Altamirano RN  Outcome: Ongoing  3/2/2022 1727 by Kamla Marrero RN  Outcome: Ongoing

## 2022-03-03 NOTE — PROGRESS NOTES
Patient discharged by self to home. Discharge paperwork provided and discussed. All questions answered. One IV removed with no complications and catheter intact. Patient ambulated off unit.

## 2022-03-03 NOTE — DISCHARGE SUMMARY
[facility]  Office: [address], DO, Hyacinth Harkins, DO, Martine Keen, DO, Jluis Torres, DO, Yobani Palma MD, Deisi Mota MD, Baldomero Gregory MD, Christain Ybarra MD, Cris Galeano MD, Santos Stephenson MD, Aviva Dias MD, Jo Ann Simeon, DO, Perfecto Llamas, DO, Simone Luis MD,  Marva Mixon DO, Nuris Vera MD, Quincy Salcedo MD, Meg Sal MD, Fanny Floyd MD, Jono Dukes MD, [facility], [facility], [facility], CNP, Anderson Borges, CNP, Chelsey Choe, CNS, Joyce Adams, CNP, Jozef Kaplan, CNP, Kasey Islas, CNP, Mj Stiles, CNP, Alka Pathak, CNP, Alfonso Cleaning PA-C, Robbie Goncalves, [facility], Brunilda Bryant, [facility], Ashkan Dan, CNP, Therese Gastelum, CNP, Omar Pemberton, CNP, Annette Howard, CNP, Jena Wang, CNP, Maurisio Begum, [facility]    Discharge Summary     Patient ID: Morteza Austni  :  1980   MRN: [id number]     ACCOUNT:  [de-identified]   Patient's PCP: Page Cruz MD  Admit Date: 2022   Discharge Date: 3/3/2022  Length of Stay: 2  Code Status:  Full Code  Admitting Physician: Yas Newberry MD  Discharge Physician: Darrel Curtis PA-C     Active Discharge Diagnoses:     Hospital Problem Lists:  Principal Problem:    Severe hypothyroidism  Active Problems:    H/O total thyroidectomy    Hypothyroid myopathy    Elevated CPK    Hypothyroidism  Resolved Problems:    * No resolved hospital problems.  *      Admission Condition:  poor     Discharged Condition: fair    Hospital Stay:     Hospital Course:     Cherrie Maki is a 35 y.o. female past medical history significant for hypothyroidism.  Patient reports that she has had extensive difficulty with her thyroid levels in the past. Wilian Miller has been on multiple different thyroid medications.  Currently she is on [place] Thyroid.  Her dose was increased from 180 to 240 mg approximately 2 weeks ago as she was having worsening symptoms including lower extremity swelling, muscle stiffness/rigidity, hair loss, cold intolerance, fatigue.  After increasing her thyroid dose, she has not noticed any significant improvement.  Her endocrinologist rechecked her thyroid labs and noted her TSH to be very elevated and T4 very low prompting her admission to the hospital.  Currently, she is complaining of pain and swelling in her lower extremities.  She states that her muscles feel very tight and sore.     3/1: received 100 mcg IV levothyroxine     3/2: received additional 100 mcg of IV levothyroxine     3/3: received additional 100 mcg of IV levothyroxine prior to discharge    Significant therapeutic interventions:     Hypothyroidism  -Continue [place] Thyroid 240 mg daily, Synthroid 50 mcg daily, liothyronine 25 mcg daily per patient's endocrinologist.  Discussed case with pt endocrinologist, Dr. Ladi Santana. Will administer additional dose of 100 mcg levothyroxine today. Pt to follow-up with Dr. Ladi Santana on 3/7/2022.      Significant Diagnostic Studies:   Labs / Micro:  CBC:   Lab Results   Component Value Date    WBC 10.3 03/02/2022    RBC 4.44 03/02/2022    RBC 3.99 04/13/2012    HGB 13.0 03/02/2022    HCT 40.5 03/02/2022    MCV 91.2 03/02/2022    MCH 29.3 03/02/2022    MCHC 32.1 03/02/2022    RDW 15.5 03/02/2022     03/02/2022     04/13/2012     BMP:    Lab Results   Component Value Date    GLUCOSE 102 03/02/2022    GLUCOSE 125 04/13/2012     03/02/2022    K 4.2 03/02/2022     03/02/2022    CO2 23 03/02/2022    ANIONGAP 11 03/02/2022    BUN 9 03/02/2022    CREATININE 0.79 03/02/2022    BUNCRER NOT REPORTED 01/15/2022    CALCIUM 8.6 03/02/2022    LABGLOM >60 03/02/2022    GFRAA >60 03/02/2022    GFR      03/02/2022     HFP:    Lab Results   Component Value Date    PROT 6.9 01/15/2022     CMP:    Lab Results   Component Value Date    GLUCOSE 102 03/02/2022    GLUCOSE 125 04/13/2012     03/02/2022    K 4.2 03/02/2022  03/02/2022    CO2 23 03/02/2022    BUN 9 03/02/2022    CREATININE 0.79 03/02/2022    ANIONGAP 11 03/02/2022    ALKPHOS 88 01/15/2022    ALT 18 01/15/2022    AST 19 01/15/2022    BILITOT <0.10 01/15/2022    LABALBU 4.2 01/15/2022    LABALBU 4.3 04/13/2012    ALBUMIN 1.6 01/15/2022    LABGLOM >60 03/02/2022    GFRAA >60 03/02/2022    GFR      03/02/2022    PROT 6.9 01/15/2022    CALCIUM 8.6 03/02/2022     PT/INR:    Lab Results   Component Value Date    PROTIME 10.0 03/02/2022    INR 0.9 03/02/2022     PTT:   Lab Results   Component Value Date    APTT 23.4 03/02/2022     FLP:    Lab Results   Component Value Date    CHOL 278 03/01/2022    TRIG 249 03/01/2022    HDL 45 03/01/2022     U/A:    Lab Results   Component Value Date    COLORU Yellow 03/01/2022    TURBIDITY Clear 03/01/2022    SPECGRAV 1.012 03/01/2022    HGBUR NEGATIVE 03/01/2022    PHUR 6.5 03/01/2022    PROTEINU TRACE 03/01/2022    GLUCOSEU NEGATIVE 03/01/2022    GLUCOSEU NEGATIVE 01/13/2012    KETUA NEGATIVE 03/01/2022    BILIRUBINUR NEGATIVE 03/01/2022    BILIRUBINUR NEGATIVE 01/13/2012    UROBILINOGEN Normal 03/01/2022    NITRU NEGATIVE 03/01/2022    LEUKOCYTESUR NEGATIVE 03/01/2022     TSH:    Lab Results   Component Value Date    TSH 23.61 03/03/2022     Radiology:  XR CHEST PORTABLE    Result Date: 2/26/2022  No acute process. Consultations:    Consults:     Final Specialist Recommendations/Findings:   IP CONSULT TO HOSPITALIST      The patient was seen and examined on day of discharge and this discharge summary is in conjunction with any daily progress note from day of discharge.     Discharge plan:     Disposition: Home    Physician Follow Up:     Dr. Katherine Villalpando 3/7/2022    Diet: regular diet    Activity: As tolerated    Discharge Medications:      Medication List      CONTINUE taking these medications    [place] Thyroid 180 MG tablet  Generic drug: thyroid     ibuprofen 600 MG tablet  Commonly known as: ADVIL;MOTRIN  Take 1 tablet by mouth every 8 hours as needed for Pain     levothyroxine 50 MCG tablet  Commonly known as: SYNTHROID     liothyronine 25 MCG tablet  Commonly known as: CYTOMEL     pregabalin 100 MG capsule  Commonly known as: LYRICA        STOP taking these medications    cholestyramine light 4 g packet     famotidine 20 MG tablet  Commonly known as: PEPCID     folic acid 1 MG tablet  Commonly known as: FOLVITE            No discharge procedures on file. Time Spent on discharge is  33 mins in patient examination, evaluation, counseling as well as medication reconciliation, prescriptions for required medications, discharge plan and follow up. Electronically signed by   Laura Lane PA-C  3/3/2022  9:38 AM      Thank you Dr. Dennise Ayala MD for the opportunity to be involved in this patient's care.

## 2022-03-03 NOTE — PROGRESS NOTES
[facility]  Office: [address], DO, Love Favre, DO, Grayland Runner, DO, Layla Eye Blood, DO, Marco Almazan MD, Wendy Diaz MD, Quique Daniels MD, Tanna Salamanca MD, Cecy Gallegos MD, Aixa العراقي MD, Herve Nagy MD, Zaida Bates, DO, Rajan Hinkle, DO, Murlean Gowers, MD,  Debbie Vernon, DO, Humberto Morley MD, Eliseo Rock MD, Rogelio Bui MD, Bowen Puente MD, Venancio Lakhani MD, Coty Schulz, Evelyn Hammer CNP, Scooter Norris, CNP, Marques Dukes, CNS, Joceline Nieto CNP, Megan Andrew, CNP, Denise Diaz, CNP, Christal Vogt, CNP, Mindi Chen, CNP, Kathi Castro PA-C, Pearl Gurrola, DNP, Silvia Galvan DNP, Jennifer Villalobos, CNP, Jad Park, CNP, Cachorro Rodríguez, CNP, Frankie Sandoval, CNP, Qing Musa, CNP, Rhett Shen, [address] [place] [address]    Progress Note    3/3/2022    9:37 AM    Name:   Nick Gilbert  MRN:     [id number]     Acct:      [de-identified]   Room:   [address] [place] [address] Day:  2  Admit Date:  2/28/2022 10:35 PM    PCP:   Jennifer Elizalde MD  Code Status:  Full Code    Subjective:     C/C:   Chief Complaint   Patient presents with    Other     abnormal labs and full body swelling     Interval History Status: not changed. Pt seen and evaluated this morning. She feels that her swelling has improved slightly. She is denying any new complaints. Brief History:     Nick Gilbert is a 35 y.o. female past medical history significant for hypothyroidism. Patient reports that she has had extensive difficulty with her thyroid levels in the past.  She has been on multiple different thyroid medications. Currently she is on [place] Thyroid. Her dose was increased from 180 to 240 mg approximately 2 weeks ago as she was having worsening symptoms including lower extremity swelling, muscle stiffness/rigidity, hair loss, cold intolerance, fatigue.   After increasing her thyroid dose, she she has been smoking cigarettes. She has a 11.50 pack-year smoking history. She has never used smokeless tobacco. She reports that she does not drink alcohol and does not use drugs. Family History:   Family History   Problem Relation Age of Onset    Diabetes Mother     Mental Illness Mother     Substance Abuse Father        Vitals:  BP (!) 148/94   Pulse 89   Temp 98.3 °F (36.8 °C) (Oral)   Resp 18   Ht 5' 4\" (1.626 m)   Wt 198 lb 14.4 oz (90.2 kg)   LMP 2022   SpO2 93%   BMI 34.14 kg/m²   Temp (24hrs), Av.5 °F (36.9 °C), Min:98.3 °F (36.8 °C), Max:98.6 °F (37 °C)    No results for input(s): POCGLU in the last 72 hours. I/O (24Hr): Intake/Output Summary (Last 24 hours) at 3/3/2022 0937  Last data filed at 3/3/2022 0612  Gross per 24 hour   Intake 0 ml   Output --   Net 0 ml       Labs:  Hematology:  Recent Labs     22  0530   WBC 13.8* 10.3   RBC 4.51 4.44   HGB 13.1 13.0   HCT 38.7 40.5   MCV 85.8 91.2   MCH 29.0 29.3   MCHC 33.9 32.1   RDW 14.9* 15.5*   PLT See Reflexed IPF Result 263   MPV  --  12.2   INR  --  0.9     Chemistry:  Recent Labs     22  0908 22  0530     --  135   K 3.5*  --  4.2   CL 99  --  101   CO2 23  --  23   GLUCOSE 144*  --  102*   BUN 9  --  9   CREATININE 1.01*  --  0.79   MG 1.9  --   --    ANIONGAP 15  --  11   LABGLOM 60*  --  >60   GFRAA >60  --  >60   CALCIUM 9.5  --  8.6   CKTOTAL 294* 226*  --    MYOGLOBIN 67*  --   --      Recent Labs     22  0908 22  0649   TSH   < >  --  23.61*   CHOL  --  278*  --    HDL  --  45  --    LDLCHOLESTEROL  --  183*  --    CHOLHDLRATIO  --  6.2*  --    TRIG  --  249*  --     < > = values in this interval not displayed.      ABG:  Lab Results   Component Value Date    FIO2 INFORMATION NOT PROVIDED 2021     Lab Results   Component Value Date/Time    SPECIAL NOT REPORTED 2020 11:06 AM     Lab Results   Component Value Date/Time CULTURE NO SIGNIFICANT GROWTH 01/13/2012 01:21 AM    CULTURE  01/13/2012 01:21 AM     Performed at [address] [place] [address] 3 [phone]       Radiology:  XR CHEST PORTABLE    Result Date: 2/26/2022  No acute process. Physical Examination:        General appearance:  alert, cooperative and no distress  Mental Status:  oriented to person, place and time and normal affect  Lungs:  clear to auscultation bilaterally, normal effort  Heart:  regular rate and rhythm, no murmur  Abdomen:  soft, nontender, nondistended, normal bowel sounds, no masses, hepatomegaly, splenomegaly  Extremities:  no edema, redness, tenderness in the calves  Skin:  no gross lesions, rashes, induration    Assessment:        Hospital Problems           Last Modified POA    * (Principal) Severe hypothyroidism 3/3/2022 Yes    H/O total thyroidectomy 3/3/2022 Yes    Hypothyroid myopathy 3/3/2022 Yes    Elevated CPK 3/3/2022 Yes    Hypothyroidism 3/3/2022 Yes          Plan:        Hypothyroidism  -Continue [place] Thyroid 240 mg daily, Synthroid 50 mcg daily, liothyronine 25 mcg daily per patient's endocrinologist.  Discussed case with pt endocrinologist, Dr. Ginger Sunshine. Will administer additional dose of 100 mcg levothyroxine today. Pt to follow-up with Dr. Ginger Sunshine on 3/7/2022.      Lamont Xiao PA-C  3/3/2022  9:37 AM

## 2022-03-04 LAB — NEUTROPHIL AB, FLOW: NEGATIVE

## 2022-04-27 ENCOUNTER — APPOINTMENT (OUTPATIENT)
Dept: GENERAL RADIOLOGY | Age: 42
DRG: 558 | End: 2022-04-27
Payer: COMMERCIAL

## 2022-04-27 ENCOUNTER — HOSPITAL ENCOUNTER (INPATIENT)
Age: 42
LOS: 3 days | Discharge: HOME | DRG: 558 | End: 2022-04-30
Attending: EMERGENCY MEDICINE | Admitting: INTERNAL MEDICINE
Payer: COMMERCIAL

## 2022-04-27 DIAGNOSIS — M62.82 NON-TRAUMATIC RHABDOMYOLYSIS: ICD-10-CM

## 2022-04-27 DIAGNOSIS — E87.6 HYPOKALEMIA: ICD-10-CM

## 2022-04-27 DIAGNOSIS — E03.8 HYPOTHYROIDISM DUE TO HASHIMOTO'S THYROIDITIS: Primary | ICD-10-CM

## 2022-04-27 DIAGNOSIS — E06.3 HYPOTHYROIDISM DUE TO HASHIMOTO'S THYROIDITIS: Primary | ICD-10-CM

## 2022-04-27 LAB
ABSOLUTE EOS #: 0.28 K/UL (ref 0–0.44)
ABSOLUTE IMMATURE GRANULOCYTE: 0.08 K/UL (ref 0–0.3)
ABSOLUTE LYMPH #: 3.78 K/UL (ref 1.1–3.7)
ABSOLUTE MONO #: 0.85 K/UL (ref 0.1–1.2)
ALBUMIN SERPL-MCNC: 4.8 G/DL (ref 3.5–5.2)
ALBUMIN/GLOBULIN RATIO: 1.7 (ref 1–2.5)
ALP BLD-CCNC: 78 U/L (ref 35–104)
ALT SERPL-CCNC: 28 U/L (ref 5–33)
ANION GAP SERPL CALCULATED.3IONS-SCNC: 16 MMOL/L (ref 9–17)
AST SERPL-CCNC: 68 U/L
BASOPHILS # BLD: 1 % (ref 0–2)
BASOPHILS ABSOLUTE: 0.16 K/UL (ref 0–0.2)
BILIRUB SERPL-MCNC: 0.31 MG/DL (ref 0.3–1.2)
BUN BLDV-MCNC: 8 MG/DL (ref 6–20)
CALCIUM SERPL-MCNC: 9.4 MG/DL (ref 8.6–10.4)
CHLORIDE BLD-SCNC: 101 MMOL/L (ref 98–107)
CO2: 22 MMOL/L (ref 20–31)
CREAT SERPL-MCNC: 0.99 MG/DL (ref 0.5–0.9)
EOSINOPHILS RELATIVE PERCENT: 2 % (ref 1–4)
GFR AFRICAN AMERICAN: >60 ML/MIN
GFR NON-AFRICAN AMERICAN: >60 ML/MIN
GFR SERPL CREATININE-BSD FRML MDRD: ABNORMAL ML/MIN/{1.73_M2}
GLUCOSE BLD-MCNC: 137 MG/DL (ref 70–99)
HCT VFR BLD CALC: 40.6 % (ref 36.3–47.1)
HEMOGLOBIN: 13.7 G/DL (ref 11.9–15.1)
IMMATURE GRANULOCYTES: 1 %
LYMPHOCYTES # BLD: 24 % (ref 24–43)
MAGNESIUM: 1.9 MG/DL (ref 1.6–2.6)
MCH RBC QN AUTO: 30.5 PG (ref 25.2–33.5)
MCHC RBC AUTO-ENTMCNC: 33.7 G/DL (ref 28.4–34.8)
MCV RBC AUTO: 90.4 FL (ref 82.6–102.9)
MONOCYTES # BLD: 5 % (ref 3–12)
MYOGLOBIN: 869 NG/ML (ref 25–58)
NRBC AUTOMATED: 0 PER 100 WBC
PDW BLD-RTO: 15.1 % (ref 11.8–14.4)
PLATELET # BLD: 292 K/UL (ref 138–453)
PMV BLD AUTO: 12 FL (ref 8.1–13.5)
POTASSIUM SERPL-SCNC: 3.1 MMOL/L (ref 3.7–5.3)
RBC # BLD: 4.49 M/UL (ref 3.95–5.11)
RBC # BLD: ABNORMAL 10*6/UL
SEG NEUTROPHILS: 67 % (ref 36–65)
SEGMENTED NEUTROPHILS ABSOLUTE COUNT: 10.64 K/UL (ref 1.5–8.1)
SODIUM BLD-SCNC: 139 MMOL/L (ref 135–144)
T3 FREE: 4.11 PG/ML (ref 2.02–4.43)
THYROXINE, FREE: 0.26 NG/DL (ref 0.93–1.7)
TOTAL CK: 3184 U/L (ref 26–192)
TOTAL PROTEIN: 7.6 G/DL (ref 6.4–8.3)
TROPONIN, HIGH SENSITIVITY: 8 NG/L (ref 0–14)
TSH SERPL DL<=0.05 MIU/L-ACNC: 65.75 UIU/ML (ref 0.3–5)
WBC # BLD: 15.8 K/UL (ref 3.5–11.3)

## 2022-04-27 PROCEDURE — 84439 ASSAY OF FREE THYROXINE: CPT

## 2022-04-27 PROCEDURE — 84481 FREE ASSAY (FT-3): CPT

## 2022-04-27 PROCEDURE — 84443 ASSAY THYROID STIM HORMONE: CPT

## 2022-04-27 PROCEDURE — 96375 TX/PRO/DX INJ NEW DRUG ADDON: CPT

## 2022-04-27 PROCEDURE — 99223 1ST HOSP IP/OBS HIGH 75: CPT | Performed by: INTERNAL MEDICINE

## 2022-04-27 PROCEDURE — 82550 ASSAY OF CK (CPK): CPT

## 2022-04-27 PROCEDURE — 93005 ELECTROCARDIOGRAM TRACING: CPT | Performed by: STUDENT IN AN ORGANIZED HEALTH CARE EDUCATION/TRAINING PROGRAM

## 2022-04-27 PROCEDURE — 83874 ASSAY OF MYOGLOBIN: CPT

## 2022-04-27 PROCEDURE — 83735 ASSAY OF MAGNESIUM: CPT

## 2022-04-27 PROCEDURE — 6360000002 HC RX W HCPCS: Performed by: STUDENT IN AN ORGANIZED HEALTH CARE EDUCATION/TRAINING PROGRAM

## 2022-04-27 PROCEDURE — 85025 COMPLETE CBC W/AUTO DIFF WBC: CPT

## 2022-04-27 PROCEDURE — 99285 EMERGENCY DEPT VISIT HI MDM: CPT

## 2022-04-27 PROCEDURE — 2580000003 HC RX 258: Performed by: STUDENT IN AN ORGANIZED HEALTH CARE EDUCATION/TRAINING PROGRAM

## 2022-04-27 PROCEDURE — 84484 ASSAY OF TROPONIN QUANT: CPT

## 2022-04-27 PROCEDURE — 80053 COMPREHEN METABOLIC PANEL: CPT

## 2022-04-27 PROCEDURE — 2060000000 HC ICU INTERMEDIATE R&B

## 2022-04-27 PROCEDURE — 96374 THER/PROPH/DIAG INJ IV PUSH: CPT

## 2022-04-27 PROCEDURE — 71045 X-RAY EXAM CHEST 1 VIEW: CPT

## 2022-04-27 RX ORDER — SODIUM CHLORIDE, SODIUM LACTATE, POTASSIUM CHLORIDE, CALCIUM CHLORIDE 600; 310; 30; 20 MG/100ML; MG/100ML; MG/100ML; MG/100ML
INJECTION, SOLUTION INTRAVENOUS CONTINUOUS
Status: DISCONTINUED | OUTPATIENT
Start: 2022-04-27 | End: 2022-04-28

## 2022-04-27 RX ORDER — MORPHINE SULFATE 4 MG/ML
4 INJECTION, SOLUTION INTRAMUSCULAR; INTRAVENOUS ONCE
Status: COMPLETED | OUTPATIENT
Start: 2022-04-27 | End: 2022-04-27

## 2022-04-27 RX ORDER — FENTANYL CITRATE 50 UG/ML
50 INJECTION, SOLUTION INTRAMUSCULAR; INTRAVENOUS ONCE
Status: COMPLETED | OUTPATIENT
Start: 2022-04-27 | End: 2022-04-27

## 2022-04-27 RX ORDER — CYCLOBENZAPRINE HCL 10 MG
5 TABLET ORAL 3 TIMES DAILY
Status: DISCONTINUED | OUTPATIENT
Start: 2022-04-27 | End: 2022-04-30 | Stop reason: HOSPADM

## 2022-04-27 RX ORDER — ONDANSETRON 2 MG/ML
4 INJECTION INTRAMUSCULAR; INTRAVENOUS ONCE
Status: COMPLETED | OUTPATIENT
Start: 2022-04-27 | End: 2022-04-27

## 2022-04-27 RX ORDER — 0.9 % SODIUM CHLORIDE 0.9 %
1000 INTRAVENOUS SOLUTION INTRAVENOUS ONCE
Status: COMPLETED | OUTPATIENT
Start: 2022-04-27 | End: 2022-04-28

## 2022-04-27 RX ADMIN — SODIUM CHLORIDE 1000 ML: 9 INJECTION, SOLUTION INTRAVENOUS at 20:24

## 2022-04-27 RX ADMIN — MORPHINE SULFATE 4 MG: 4 INJECTION INTRAVENOUS at 23:08

## 2022-04-27 RX ADMIN — ONDANSETRON 4 MG: 2 INJECTION INTRAMUSCULAR; INTRAVENOUS at 20:23

## 2022-04-27 RX ADMIN — FENTANYL CITRATE 50 MCG: 50 INJECTION, SOLUTION INTRAMUSCULAR; INTRAVENOUS at 20:24

## 2022-04-27 ASSESSMENT — ENCOUNTER SYMPTOMS
ABDOMINAL PAIN: 0
COUGH: 0
COLOR CHANGE: 0
VOMITING: 0
SHORTNESS OF BREATH: 0
NAUSEA: 0

## 2022-04-27 ASSESSMENT — PAIN SCALES - GENERAL
PAINLEVEL_OUTOF10: 10
PAINLEVEL_OUTOF10: 10

## 2022-04-27 ASSESSMENT — PAIN - FUNCTIONAL ASSESSMENT: PAIN_FUNCTIONAL_ASSESSMENT: 0-10

## 2022-04-28 LAB
-: ABNORMAL
ABSOLUTE EOS #: 0.28 K/UL (ref 0–0.44)
ABSOLUTE IMMATURE GRANULOCYTE: 0.05 K/UL (ref 0–0.3)
ABSOLUTE LYMPH #: 2.75 K/UL (ref 1.1–3.7)
ABSOLUTE MONO #: 0.45 K/UL (ref 0.1–1.2)
ALBUMIN SERPL-MCNC: 4.5 G/DL (ref 3.5–5.2)
ALBUMIN/GLOBULIN RATIO: 1.7 (ref 1–2.5)
ALP BLD-CCNC: 71 U/L (ref 35–104)
ALT SERPL-CCNC: 38 U/L (ref 5–33)
AMPHETAMINE SCREEN URINE: NEGATIVE
ANION GAP SERPL CALCULATED.3IONS-SCNC: 9 MMOL/L (ref 9–17)
AST SERPL-CCNC: 79 U/L
BACTERIA: ABNORMAL
BARBITURATE SCREEN URINE: NEGATIVE
BASOPHILS # BLD: 1 % (ref 0–2)
BASOPHILS ABSOLUTE: 0.12 K/UL (ref 0–0.2)
BENZODIAZEPINE SCREEN, URINE: NEGATIVE
BILIRUB SERPL-MCNC: 0.44 MG/DL (ref 0.3–1.2)
BILIRUBIN URINE: NEGATIVE
BUN BLDV-MCNC: 7 MG/DL (ref 6–20)
C-REACTIVE PROTEIN: 13.9 MG/L (ref 0–5)
CALCIUM SERPL-MCNC: 8.7 MG/DL (ref 8.6–10.4)
CANNABINOID SCREEN URINE: NEGATIVE
CASTS UA: ABNORMAL /LPF (ref 0–8)
CHLORIDE BLD-SCNC: 104 MMOL/L (ref 98–107)
CO2: 25 MMOL/L (ref 20–31)
COCAINE METABOLITE, URINE: NEGATIVE
COLOR: ABNORMAL
CREAT SERPL-MCNC: 0.81 MG/DL (ref 0.5–0.9)
EOSINOPHILS RELATIVE PERCENT: 3 % (ref 1–4)
EPITHELIAL CELLS UA: ABNORMAL /HPF (ref 0–5)
GFR AFRICAN AMERICAN: >60 ML/MIN
GFR NON-AFRICAN AMERICAN: >60 ML/MIN
GFR SERPL CREATININE-BSD FRML MDRD: ABNORMAL ML/MIN/{1.73_M2}
GLUCOSE BLD-MCNC: 99 MG/DL (ref 70–99)
GLUCOSE URINE: NEGATIVE
HCT VFR BLD CALC: 38.6 % (ref 36.3–47.1)
HEMOGLOBIN: 13.1 G/DL (ref 11.9–15.1)
IMMATURE GRANULOCYTES: 1 %
KETONES, URINE: NEGATIVE
LEUKOCYTE ESTERASE, URINE: ABNORMAL
LYMPHOCYTES # BLD: 31 % (ref 24–43)
MCH RBC QN AUTO: 30.6 PG (ref 25.2–33.5)
MCHC RBC AUTO-ENTMCNC: 33.9 G/DL (ref 28.4–34.8)
MCV RBC AUTO: 90.2 FL (ref 82.6–102.9)
METHADONE SCREEN, URINE: NEGATIVE
MONOCYTES # BLD: 5 % (ref 3–12)
NITRITE, URINE: NEGATIVE
NRBC AUTOMATED: 0 PER 100 WBC
OPIATES, URINE: POSITIVE
OXYCODONE SCREEN URINE: NEGATIVE
PDW BLD-RTO: 15.2 % (ref 11.8–14.4)
PH UA: 7 (ref 5–8)
PHENCYCLIDINE, URINE: NEGATIVE
PLATELET # BLD: 256 K/UL (ref 138–453)
PMV BLD AUTO: 11.6 FL (ref 8.1–13.5)
POTASSIUM SERPL-SCNC: 3.5 MMOL/L (ref 3.7–5.3)
PROTEIN UA: ABNORMAL
RBC # BLD: 4.28 M/UL (ref 3.95–5.11)
RBC # BLD: ABNORMAL 10*6/UL
RBC UA: ABNORMAL /HPF (ref 0–4)
SEDIMENTATION RATE, ERYTHROCYTE: 29 MM/HR (ref 0–20)
SEG NEUTROPHILS: 59 % (ref 36–65)
SEGMENTED NEUTROPHILS ABSOLUTE COUNT: 5.2 K/UL (ref 1.5–8.1)
SODIUM BLD-SCNC: 138 MMOL/L (ref 135–144)
SPECIFIC GRAVITY UA: 1.01 (ref 1–1.03)
TEST INFORMATION: ABNORMAL
TOTAL CK: 2496 U/L (ref 26–192)
TOTAL PROTEIN: 7.2 G/DL (ref 6.4–8.3)
TURBIDITY: ABNORMAL
URINE HGB: ABNORMAL
UROBILINOGEN, URINE: NORMAL
WBC # BLD: 8.9 K/UL (ref 3.5–11.3)
WBC UA: ABNORMAL /HPF (ref 0–5)

## 2022-04-28 PROCEDURE — 2580000003 HC RX 258: Performed by: INTERNAL MEDICINE

## 2022-04-28 PROCEDURE — 6370000000 HC RX 637 (ALT 250 FOR IP): Performed by: INTERNAL MEDICINE

## 2022-04-28 PROCEDURE — 85652 RBC SED RATE AUTOMATED: CPT

## 2022-04-28 PROCEDURE — 2060000000 HC ICU INTERMEDIATE R&B

## 2022-04-28 PROCEDURE — 2580000003 HC RX 258: Performed by: NURSE PRACTITIONER

## 2022-04-28 PROCEDURE — 87186 SC STD MICRODIL/AGAR DIL: CPT

## 2022-04-28 PROCEDURE — 36415 COLL VENOUS BLD VENIPUNCTURE: CPT

## 2022-04-28 PROCEDURE — 87040 BLOOD CULTURE FOR BACTERIA: CPT

## 2022-04-28 PROCEDURE — 80307 DRUG TEST PRSMV CHEM ANLYZR: CPT

## 2022-04-28 PROCEDURE — 85025 COMPLETE CBC W/AUTO DIFF WBC: CPT

## 2022-04-28 PROCEDURE — 6360000002 HC RX W HCPCS: Performed by: INTERNAL MEDICINE

## 2022-04-28 PROCEDURE — 99232 SBSQ HOSP IP/OBS MODERATE 35: CPT | Performed by: INTERNAL MEDICINE

## 2022-04-28 PROCEDURE — 94761 N-INVAS EAR/PLS OXIMETRY MLT: CPT

## 2022-04-28 PROCEDURE — 97161 PT EVAL LOW COMPLEX 20 MIN: CPT

## 2022-04-28 PROCEDURE — 2580000003 HC RX 258: Performed by: STUDENT IN AN ORGANIZED HEALTH CARE EDUCATION/TRAINING PROGRAM

## 2022-04-28 PROCEDURE — 6370000000 HC RX 637 (ALT 250 FOR IP): Performed by: NURSE PRACTITIONER

## 2022-04-28 PROCEDURE — A4216 STERILE WATER/SALINE, 10 ML: HCPCS | Performed by: STUDENT IN AN ORGANIZED HEALTH CARE EDUCATION/TRAINING PROGRAM

## 2022-04-28 PROCEDURE — 87077 CULTURE AEROBIC IDENTIFY: CPT

## 2022-04-28 PROCEDURE — 80053 COMPREHEN METABOLIC PANEL: CPT

## 2022-04-28 PROCEDURE — 87086 URINE CULTURE/COLONY COUNT: CPT

## 2022-04-28 PROCEDURE — 6360000002 HC RX W HCPCS

## 2022-04-28 PROCEDURE — 97116 GAIT TRAINING THERAPY: CPT

## 2022-04-28 PROCEDURE — 86140 C-REACTIVE PROTEIN: CPT

## 2022-04-28 PROCEDURE — 81001 URINALYSIS AUTO W/SCOPE: CPT

## 2022-04-28 PROCEDURE — 2500000003 HC RX 250 WO HCPCS: Performed by: INTERNAL MEDICINE

## 2022-04-28 PROCEDURE — 82550 ASSAY OF CK (CPK): CPT

## 2022-04-28 PROCEDURE — 2500000003 HC RX 250 WO HCPCS: Performed by: STUDENT IN AN ORGANIZED HEALTH CARE EDUCATION/TRAINING PROGRAM

## 2022-04-28 RX ORDER — ONDANSETRON 4 MG/1
4 TABLET, ORALLY DISINTEGRATING ORAL EVERY 8 HOURS PRN
Status: DISCONTINUED | OUTPATIENT
Start: 2022-04-28 | End: 2022-04-30 | Stop reason: HOSPADM

## 2022-04-28 RX ORDER — LEVOTHYROXINE SODIUM 0.1 MG/1
200 TABLET ORAL DAILY
Status: DISCONTINUED | OUTPATIENT
Start: 2022-04-29 | End: 2022-04-30 | Stop reason: HOSPADM

## 2022-04-28 RX ORDER — SODIUM CHLORIDE 9 MG/ML
INJECTION, SOLUTION INTRAVENOUS CONTINUOUS
Status: DISCONTINUED | OUTPATIENT
Start: 2022-04-28 | End: 2022-04-30 | Stop reason: HOSPADM

## 2022-04-28 RX ORDER — POTASSIUM CHLORIDE 7.45 MG/ML
10 INJECTION INTRAVENOUS PRN
Status: DISCONTINUED | OUTPATIENT
Start: 2022-04-28 | End: 2022-04-30 | Stop reason: HOSPADM

## 2022-04-28 RX ORDER — MORPHINE SULFATE 4 MG/ML
4 INJECTION, SOLUTION INTRAMUSCULAR; INTRAVENOUS ONCE
Status: COMPLETED | OUTPATIENT
Start: 2022-04-28 | End: 2022-04-28

## 2022-04-28 RX ORDER — ONDANSETRON 2 MG/ML
4 INJECTION INTRAMUSCULAR; INTRAVENOUS EVERY 6 HOURS PRN
Status: DISCONTINUED | OUTPATIENT
Start: 2022-04-28 | End: 2022-04-30 | Stop reason: HOSPADM

## 2022-04-28 RX ORDER — ACETAMINOPHEN 650 MG/1
650 SUPPOSITORY RECTAL EVERY 6 HOURS PRN
Status: DISCONTINUED | OUTPATIENT
Start: 2022-04-28 | End: 2022-04-30 | Stop reason: HOSPADM

## 2022-04-28 RX ORDER — SODIUM CHLORIDE 9 MG/ML
INJECTION, SOLUTION INTRAVENOUS PRN
Status: DISCONTINUED | OUTPATIENT
Start: 2022-04-28 | End: 2022-04-30 | Stop reason: HOSPADM

## 2022-04-28 RX ORDER — SODIUM CHLORIDE 9 MG/ML
5 INJECTION INTRAVENOUS DAILY
Status: DISCONTINUED | OUTPATIENT
Start: 2022-04-28 | End: 2022-04-30 | Stop reason: HOSPADM

## 2022-04-28 RX ORDER — LIOTHYRONINE SODIUM 25 UG/1
50 TABLET ORAL DAILY
Status: DISCONTINUED | OUTPATIENT
Start: 2022-04-29 | End: 2022-04-30 | Stop reason: HOSPADM

## 2022-04-28 RX ORDER — SODIUM CHLORIDE 0.9 % (FLUSH) 0.9 %
5-40 SYRINGE (ML) INJECTION EVERY 12 HOURS SCHEDULED
Status: DISCONTINUED | OUTPATIENT
Start: 2022-04-28 | End: 2022-04-30 | Stop reason: HOSPADM

## 2022-04-28 RX ORDER — SODIUM CHLORIDE 0.9 % (FLUSH) 0.9 %
10 SYRINGE (ML) INJECTION PRN
Status: DISCONTINUED | OUTPATIENT
Start: 2022-04-28 | End: 2022-04-30 | Stop reason: HOSPADM

## 2022-04-28 RX ORDER — POTASSIUM CHLORIDE 20 MEQ/1
40 TABLET, EXTENDED RELEASE ORAL PRN
Status: DISCONTINUED | OUTPATIENT
Start: 2022-04-28 | End: 2022-04-30 | Stop reason: HOSPADM

## 2022-04-28 RX ORDER — PREGABALIN 100 MG/1
100 CAPSULE ORAL 3 TIMES DAILY
Status: DISCONTINUED | OUTPATIENT
Start: 2022-04-28 | End: 2022-04-30 | Stop reason: HOSPADM

## 2022-04-28 RX ORDER — LIOTHYRONINE SODIUM 25 UG/1
25 TABLET ORAL DAILY
Status: DISCONTINUED | OUTPATIENT
Start: 2022-04-28 | End: 2022-04-28

## 2022-04-28 RX ORDER — SODIUM CHLORIDE 9 MG/ML
INJECTION, SOLUTION INTRAVENOUS CONTINUOUS
Status: DISCONTINUED | OUTPATIENT
Start: 2022-04-28 | End: 2022-04-28

## 2022-04-28 RX ORDER — LEVOTHYROXINE SODIUM 0.05 MG/1
50 TABLET ORAL DAILY
Status: DISCONTINUED | OUTPATIENT
Start: 2022-04-28 | End: 2022-04-28

## 2022-04-28 RX ORDER — MAGNESIUM SULFATE 1 G/100ML
1000 INJECTION INTRAVENOUS PRN
Status: DISCONTINUED | OUTPATIENT
Start: 2022-04-28 | End: 2022-04-30 | Stop reason: HOSPADM

## 2022-04-28 RX ORDER — ACETAMINOPHEN 325 MG/1
650 TABLET ORAL EVERY 6 HOURS PRN
Status: DISCONTINUED | OUTPATIENT
Start: 2022-04-28 | End: 2022-04-30 | Stop reason: HOSPADM

## 2022-04-28 RX ORDER — LEVOTHYROXINE SODIUM ANHYDROUS 100 UG/5ML
100 INJECTION, POWDER, LYOPHILIZED, FOR SOLUTION INTRAVENOUS DAILY
Status: DISCONTINUED | OUTPATIENT
Start: 2022-04-28 | End: 2022-04-30 | Stop reason: HOSPADM

## 2022-04-28 RX ORDER — ENOXAPARIN SODIUM 100 MG/ML
40 INJECTION SUBCUTANEOUS DAILY
Status: DISCONTINUED | OUTPATIENT
Start: 2022-04-28 | End: 2022-04-30 | Stop reason: HOSPADM

## 2022-04-28 RX ORDER — POLYETHYLENE GLYCOL 3350 17 G/17G
17 POWDER, FOR SOLUTION ORAL DAILY PRN
Status: DISCONTINUED | OUTPATIENT
Start: 2022-04-28 | End: 2022-04-30 | Stop reason: HOSPADM

## 2022-04-28 RX ORDER — MORPHINE SULFATE 2 MG/ML
2 INJECTION, SOLUTION INTRAMUSCULAR; INTRAVENOUS EVERY 4 HOURS PRN
Status: DISCONTINUED | OUTPATIENT
Start: 2022-04-28 | End: 2022-04-30 | Stop reason: HOSPADM

## 2022-04-28 RX ADMIN — MORPHINE SULFATE 2 MG: 2 INJECTION, SOLUTION INTRAMUSCULAR; INTRAVENOUS at 14:20

## 2022-04-28 RX ADMIN — LIOTHYRONINE SODIUM 25 MCG: 25 TABLET ORAL at 10:45

## 2022-04-28 RX ADMIN — SODIUM CHLORIDE, POTASSIUM CHLORIDE, SODIUM LACTATE AND CALCIUM CHLORIDE: 600; 310; 30; 20 INJECTION, SOLUTION INTRAVENOUS at 01:58

## 2022-04-28 RX ADMIN — THYROID, PORCINE 240 MG: 180 TABLET ORAL at 10:45

## 2022-04-28 RX ADMIN — MORPHINE SULFATE 4 MG: 4 INJECTION INTRAVENOUS at 07:59

## 2022-04-28 RX ADMIN — CYCLOBENZAPRINE 5 MG: 10 TABLET, FILM COATED ORAL at 17:16

## 2022-04-28 RX ADMIN — CYCLOBENZAPRINE 5 MG: 10 TABLET, FILM COATED ORAL at 11:57

## 2022-04-28 RX ADMIN — LEVOTHYROXINE SODIUM ANHYDROUS 100 MCG: 100 INJECTION, POWDER, LYOPHILIZED, FOR SOLUTION INTRAVENOUS at 10:45

## 2022-04-28 RX ADMIN — CEFTRIAXONE SODIUM 1000 MG: 1 INJECTION, POWDER, FOR SOLUTION INTRAMUSCULAR; INTRAVENOUS at 17:32

## 2022-04-28 RX ADMIN — MORPHINE SULFATE 2 MG: 2 INJECTION, SOLUTION INTRAMUSCULAR; INTRAVENOUS at 22:37

## 2022-04-28 RX ADMIN — POTASSIUM CHLORIDE 40 MEQ: 1500 TABLET, EXTENDED RELEASE ORAL at 12:26

## 2022-04-28 RX ADMIN — SODIUM CHLORIDE: 9 INJECTION, SOLUTION INTRAVENOUS at 12:10

## 2022-04-28 RX ADMIN — SODIUM CHLORIDE, PRESERVATIVE FREE 10 ML: 5 INJECTION INTRAVENOUS at 10:57

## 2022-04-28 RX ADMIN — SODIUM CHLORIDE, POTASSIUM CHLORIDE, SODIUM LACTATE AND CALCIUM CHLORIDE: 600; 310; 30; 20 INJECTION, SOLUTION INTRAVENOUS at 10:32

## 2022-04-28 RX ADMIN — CYCLOBENZAPRINE 5 MG: 10 TABLET, FILM COATED ORAL at 22:26

## 2022-04-28 RX ADMIN — SODIUM CHLORIDE: 9 INJECTION, SOLUTION INTRAVENOUS at 19:58

## 2022-04-28 RX ADMIN — SODIUM CHLORIDE, PRESERVATIVE FREE 10 ML: 5 INJECTION INTRAVENOUS at 22:37

## 2022-04-28 RX ADMIN — ACETAMINOPHEN 650 MG: 325 TABLET ORAL at 17:22

## 2022-04-28 RX ADMIN — CYCLOBENZAPRINE 5 MG: 10 TABLET, FILM COATED ORAL at 01:56

## 2022-04-28 RX ADMIN — PREGABALIN 100 MG: 100 CAPSULE ORAL at 22:28

## 2022-04-28 RX ADMIN — SODIUM CHLORIDE, PRESERVATIVE FREE 10 ML: 5 INJECTION INTRAVENOUS at 22:29

## 2022-04-28 RX ADMIN — SODIUM CHLORIDE, PRESERVATIVE FREE 5 ML: 5 INJECTION INTRAVENOUS at 10:45

## 2022-04-28 RX ADMIN — PREGABALIN 100 MG: 100 CAPSULE ORAL at 17:16

## 2022-04-28 RX ADMIN — PREGABALIN 100 MG: 100 CAPSULE ORAL at 11:58

## 2022-04-28 ASSESSMENT — ENCOUNTER SYMPTOMS
CHOKING: 0
NAUSEA: 1
RHINORRHEA: 0
COUGH: 0
DIARRHEA: 1
WHEEZING: 0
CONSTIPATION: 1
BLOOD IN STOOL: 0
ABDOMINAL PAIN: 0
VOMITING: 0
SHORTNESS OF BREATH: 0
SORE THROAT: 0
BACK PAIN: 1

## 2022-04-28 ASSESSMENT — PAIN DESCRIPTION - ORIENTATION
ORIENTATION: LEFT;RIGHT
ORIENTATION: RIGHT;LEFT
ORIENTATION: LEFT;RIGHT

## 2022-04-28 ASSESSMENT — PAIN DESCRIPTION - LOCATION
LOCATION: LEG
LOCATION: LEG
LOCATION: ARM;LEG

## 2022-04-28 ASSESSMENT — PAIN - FUNCTIONAL ASSESSMENT: PAIN_FUNCTIONAL_ASSESSMENT: PREVENTS OR INTERFERES SOME ACTIVE ACTIVITIES AND ADLS

## 2022-04-28 ASSESSMENT — PAIN SCALES - GENERAL
PAINLEVEL_OUTOF10: 10
PAINLEVEL_OUTOF10: 7
PAINLEVEL_OUTOF10: 10
PAINLEVEL_OUTOF10: 8
PAINLEVEL_OUTOF10: 10
PAINLEVEL_OUTOF10: 7
PAINLEVEL_OUTOF10: 8

## 2022-04-28 NOTE — ED PROVIDER NOTES
[facility]     Emergency Department     Faculty Attestation    I performed a history and physical examination of the patient and discussed management with the resident. I reviewed the residents note and agree with the documented findings and plan of care. Any areas of disagreement are noted on the chart. I was personally present for the key portions of any procedures. I have documented in the chart those procedures where I was not present during the key portions. I have reviewed the emergency nurses triage note. I agree with the chief complaint, past medical history, past surgical history, allergies, medications, social and family history as documented unless otherwise noted below. Documentation of the HPI, Physical Exam and Medical Decision Making performed by medical students or scribes is based on my personal performance of the HPI, PE and MDM. For Physician Assistant/ Nurse Practitioner cases/documentation I have personally evaluated this patient and have completed at least one if not all key elements of the E/M (history, physical exam, and MDM). Additional findings are as noted. Vital signs:   Vitals:    04/27/22 1912   BP: (!) 200/113   Pulse: 100   Resp: 18   Temp: 99 °F (37.2 °C)   SpO2: 80       40-year-old female here with fatigue, diarrhea, diffuse myalgias. Known history of hypothyroidism. Prior admissions for IV thyroid hormone. Recently started on a new medication for hypertriglyceridemia. She is on [place] thyroid, synthroid and liothyronine. She states her legs are so painful that she can't walk. Plan for thyroid studies, CK, myoglobin.      EKG Interpretation    Interpreted by emergency department physician    Rhythm: normal sinus   Rate: normal  Axis: normal  Ectopy: none  Conduction: normal  ST Segments: normal  T Waves: non-specific changes  Q Waves: III    Clinical Impression: non-specific EKG    MD Arlette Arevalo M.D,  Attending Emergency Physician           Michael Monahan MD  04/27/22 8607

## 2022-04-28 NOTE — ED NOTES
Pt moved from [address] [place] [address] via wheelchair to ED 33.  Pt is a/o x4. Pt states that she has problems regulating her thyroid. Pt c/o all over body pains. Pt laced on cardiac monitor. IV established. EKG and labs obtained. Call light provided. Will continue to monitor.       Britany Lin RN  04/27/22 2113

## 2022-04-28 NOTE — PROGRESS NOTES
Physical Therapy  Facility/Department: [facility] NEURO  Physical Therapy Initial Assessment    Name: Edgard Brothers  : 1980  MRN: [id number]  Date of Service: 2022  Chief Complaint   Patient presents with    Other     Issues with regulating TSH levels. Body aches/swelling. Discharge Recommendations:  Patient would benefit from continued therapy after discharge,Continue to assess pending progress   PT Equipment Recommendations  Equipment Needed: Yes  Mobility Devices: Ferguson Hair: Rolling  Other: Continue to assess pending progress- pt notes slight improvement in gait with use of device      Patient Diagnosis(es): The primary encounter diagnosis was Hypothyroidism due to Hashimoto's thyroiditis. A diagnosis of Non-traumatic rhabdomyolysis was also pertinent to this visit. Past Medical History:  has a past medical history of Carpal tunnel syndrome, Enlarged thyroid, H/O D&C (), H/O LEEP (), H/O SAB x 4, Incomplete miscarriage, Miscarriage, Psychiatric problem, Thyroid disease, and Thyroiditis. Past Surgical History:  has a past surgical history that includes LEEP; Cholecystectomy; Dilation and curettage of uterus; Dilation and curettage of uterus (2017); pr induced abortn by dil/evac (N/A, 2017); and Thyroidectomy. Assessment   Body Structures, Functions, Activity Limitations Requiring Skilled Therapeutic Intervention: Decreased functional mobility ; Decreased strength; Increased pain;Decreased balance  Assessment: Pt with mild mobility impairments secondary to LE pain and weakness. Pt able to ambulate household distances with and without device- although notes device does assist with pain management. Pt may benefit from OP PT at discharge for overall extremity strengthening x4 and gross balance training but will continue to assess pending her overall improvements.   Therapy Prognosis: Good  Decision Making: Low Complexity  Requires PT Follow-Up: Yes  Activity Tolerance  Activity Tolerance: Patient limited by pain  Activity Tolerance Comments: Limited by LE pain     Plan   Plan  Plan:  (4-5x)  Current Treatment Recommendations: Strengthening,Balance training,Functional mobility training,Transfer training,Endurance training,Gait training,Stair training,Patient/Caregiver education & training,Safety education & training,Therapeutic activities  Safety Devices  Type of Devices: Call light within reach,Left in bed  Restraints  Restraints Initially in Place: No     Restrictions  Restrictions/Precautions  Restrictions/Precautions: General Precautions  Required Braces or Orthoses?: No  Position Activity Restriction  Other position/activity restrictions: up with assistance     Subjective   General  Patient assessed for rehabilitation services?: Yes  Response To Previous Treatment: Not applicable  Family / Caregiver Present: No  Follows Commands: Within Functional Limits  Subjective  Subjective: Pt supine in bed upon arrival and agreeable to therapy.  Pt reports 7/10 LE pain at rest- up to 10/10 with mobility         Social/Functional History  Social/Functional History  Lives With: Significant other ( and four kids ([address], 15, 6 and 10 yo))  Type of Home: House  Home Layout: Two level,Bed/Bath upstairs (Pt has been staying on first floor; has full bath on main level also)  Home Access: Stairs to enter without rails  Entrance Stairs - Number of Steps: 1  Bathroom Shower/Tub: Tub/Shower unit  Bathroom Toilet: Standard  Bathroom Equipment:  (No bathroom equipment)  Home Equipment:  (No DME)  Has the patient had two or more falls in the past year or any fall with injury in the past year?: No  ADL Assistance: [address] [place] [address] Avenue: Independent  Homemaking Responsibilities: Yes  Meal Prep Responsibility: Primary  Laundry Responsibility: Primary  Cleaning Responsibility: Primary  Shopping Responsibility: Primary  Ambulation Assistance: Independent  Transfer Assistance: Independent  Active : Yes  Mode of Transportation: Car  Occupation: Full time employment  Type of Occupation:  for a [employer]: Enjoys spending time with her children- outside activities.   Additional Comments: Pt notes a gradual decline in function since February    Cognition   Orientation  Overall Orientation Status: Within Functional Limits  Cognition  Overall Cognitive Status: WFL     Objective   Gross Assessment  Sensation: Impaired (Pt reports numbness and tingling in bilateral UEs/LEs- intermittent in nature- subacute in past 1.5-2 weeks)     AROM RLE (degrees)  RLE AROM: WFL  AROM RUE (degrees)  RUE AROM : WFL  AROM LUE (degrees)  LUE AROM : WFL  Strength RLE  Comment: 3+/5 demonstrated; unable to tolerate resistance  Strength LLE  Comment: 3+/5 demonstrated; unable to tolerate resistance  Strength RUE  Comment: grossly 4-/5  Strength LUE  Comment: grossly 4-/5           Bed mobility  Supine to Sit: Supervision  Sit to Supine: Supervision  Scooting: Supervision  Transfers  Sit to Stand: Stand by assistance  Stand to sit: Stand by assistance  Ambulation  Surface: level tile  Device: No Device  Assistance: Stand by assistance  Quality of Gait: antalgic, decreased step length, decreased gait speed, cautious gait  Gait Deviations: Slow Nena;Decreased step length;Shuffles;Decreased step height  Distance: 12ft  More Ambulation?: Yes  Ambulation 2  Surface - 2: level tile  Device 2: Rolling Walker  Assistance 2: Stand by assistance  Quality of Gait 2: antalgic- slight improvement from no device, decreased step length, decreased gait speed, cautious gait  Gait Deviations: Slow Nena;Decreased step length;Decreased step height  Distance: 8ft  Stairs/Curb  Stairs?: No     Balance  Posture: Fair  Sitting - Static: Good  Sitting - Dynamic: Good  Standing - Static: Fair;+  Standing - Dynamic: Fair;+  Comments: standing balance assessed with RW and no device Educated pt on importance of mobility, use of walker if feeling more stable with device - verbalized understanding.        AM-PAC Score  AM-PAC Inpatient Mobility Raw Score : 18 (04/28/22 1550)  AM-PAC Inpatient T-Scale Score : 43.63 (04/28/22 1550)  Mobility Inpatient CMS 0-100% Score: 46.58 (04/28/22 1550)  Mobility Inpatient CMS G-Code Modifier : CK (04/28/22 1550)          Goals  Short Term Goals  Time Frame for Short term goals: 14 visits  Short term goal 1: Pt to ambulate 300ft independently without device  Short term goal 2: Pt to sit <> stand transfer independently  Short term goal 3: Pt to demonstrate independent bed mobility  Short term goal 4: Pt to ascend/descend flight of stairs independently  Short term goal 5: Pt to demonstrate good standing balance       Therapy Time   Individual Concurrent Group Co-treatment   Time In 0323         Time Out 0345         Minutes 22         Timed Code Treatment Minutes: [address] [facility],

## 2022-04-28 NOTE — CARE COORDINATION
Transitional planning    Patient not in room when Mray Pace came to do intake assessment, will reattempt if time allows

## 2022-04-28 NOTE — ED PROVIDER NOTES
[facility] ED  Emergency Department  Emergency Medicine Resident Sign-out     Care of Therman Done was assumed from Dr. Javier Francis and is being seen for Other (Issues with regulating TSH levels. Body aches/swelling. )  . The patient's initial evaluation and plan have been discussed with the prior provider who initially evaluated the patient. EMERGENCY DEPARTMENT COURSE / MEDICAL DECISION MAKING:       MEDICATIONS GIVEN:  Orders Placed This Encounter   Medications    0.9 % sodium chloride bolus    ondansetron (ZOFRAN) injection 4 mg    fentaNYL (SUBLIMAZE) injection 50 mcg    morphine injection 4 mg    potassium bicarb-citric acid (EFFER-K) effervescent tablet 40 mEq    lactated ringers infusion    cyclobenzaprine (FLEXERIL) tablet 5 mg       LABS / RADIOLOGY:     Labs Reviewed   CBC WITH AUTO DIFFERENTIAL - Abnormal; Notable for the following components:       Result Value    WBC 15.8 (*)     RDW 15.1 (*)     Seg Neutrophils 67 (*)     Immature Granulocytes 1 (*)     Segs Absolute 10.64 (*)     Absolute Lymph # 3.78 (*)     All other components within normal limits   COMPREHENSIVE METABOLIC PANEL - Abnormal; Notable for the following components:    Glucose 137 (*)     CREATININE 0.99 (*)     Potassium 3.1 (*)     AST 68 (*)     All other components within normal limits   CK - Abnormal; Notable for the following components:     Total CK 3,184 (*)     All other components within normal limits   MYOGLOBIN, SERUM - Abnormal; Notable for the following components:    Myoglobin 869 (*)     All other components within normal limits   TSH - Abnormal; Notable for the following components:    TSH 65.75 (*)     All other components within normal limits   T4, FREE - Abnormal; Notable for the following components:    Thyroxine, Free 0.26 (*)     All other components within normal limits   TROPONIN   MAGNESIUM   T3, FREE       XR CHEST PORTABLE    Result Date: 4/27/2022  EXAMINATION: ONE XRAY VIEW OF THE CHEST 4/27/2022 8:42 pm COMPARISON: None. HISTORY: ORDERING SYSTEM PROVIDED HISTORY: tachy TECHNOLOGIST PROVIDED HISTORY: tachy Reason for Exam: upr,tachy,thyroid problems FINDINGS: The lungs are clear, no effusion. No pneumothorax. Heart is normal size. Mediastinal and hilar contours are within normal limits. Bony thorax no acute abnormality. 1. No acute cardiopulmonary abnormality. RECENT VITALS:     Temp: 99 °F (37.2 °C),  Pulse: 73, Resp: 19, BP: (!) 136/90, SpO2: 94 %    This patient is a 35 y.o. Female with complaint of generalized weakness, diffuse myalgias, chills and fatigue. History of Hashimoto's thyroiditis on 3 different thyroid medications which have been uptitrated by her endocrinologist.  Laboratory work remarkable for TSH of 65, T4 of 0.26 as well as elevated CK and myoglobin concerning for rhabdomyolysis. Patient given 1 L fluids, symptomatic control with pain medication, potassium replacement. OUTSTANDING TASKS / RECOMMENDATIONS:    1. Admitted, awaiting bed     FINAL IMPRESSION:     1. Hypothyroidism due to Hashimoto's thyroiditis    2. Non-traumatic rhabdomyolysis        DISPOSITION:         DISPOSITION:  []  Discharge   []  Transfer -    [x]  Admission - intermed     []  Against Medical Advice   []  Eloped   FOLLOW-UP: No follow-up provider specified.    DISCHARGE MEDICATIONS: New Prescriptions    No medications on file           Alex Cohen DO  Emergency Medicine Resident  [address] [place], [place]  04/27/22 [address] [facility]   04/28/22 2886

## 2022-04-28 NOTE — ED PROVIDER NOTES
[facility]  ED  Emergency Department Encounter  Emergency Medicine Resident     Pt Name: Branden De León  MRN: [id number]  [place] 1980  Date of evaluation: 4/27/22  PCP:  Rama Rueda MD    CHIEF COMPLAINT       Chief Complaint   Patient presents with    Other     Issues with regulating TSH levels. Body aches/swelling. HISTORY OFPRESENT ILLNESS  (Location/Symptom, Timing/Onset, Context/Setting, Quality, Duration, Modifying Factors,Severity.)      Branden De León is a 35 y.o. female with past medical history of hypothyroidism presents with complaints of diffuse myalgias, generalized weakness, fatigue and a sensation of feeling cold. Patient states she has had these symptoms in the past, most recently in February 2022 after which she had to be admitted for adjustment of thyroid medication. Patient states she just saw her endocrinologist Dr. Chris Art 2 weeks ago and he increased her thyroid medications. Currently patient is on 200 mg of levothyroxine, 300 mg of [place] thyroid as well as 50 mg of liothyronine. Patient was also recently started on Crestor for hyperlipidemia. Patient states today she has had bilateral lower extremity pain and a sensation of feeling weak to the point she has been having trouble with ambulation. Patient denies chest pain or shortness of breath. PAST MEDICAL / SURGICAL / SOCIAL / FAMILY HISTORY      has a past medical history of Carpal tunnel syndrome, Enlarged thyroid, H/O D&C (2001), H/O LEEP (1997), H/O SAB x 4, Incomplete miscarriage, Miscarriage, Psychiatric problem, Thyroid disease, and Thyroiditis. has a past surgical history that includes LEEP; Cholecystectomy; Dilation and curettage of uterus; Dilation and curettage of uterus (06/11/2017); pr induced abortn by dil/evac (N/A, 6/11/2017); and Thyroidectomy.     Social History     Socioeconomic History    Marital status:      Spouse name: Not on file    Number of children: Not on file    Years of education: Not on file    Highest education level: Not on file   Occupational History    Not on file   Tobacco Use    Smoking status: Current Every Day Smoker     Packs/day: 0.50     Years: 23.00     Pack years: 11.50     Types: Cigarettes    Smokeless tobacco: Never Used   Substance and Sexual Activity    Alcohol use: No    Drug use: No    Sexual activity: Not on file   Other Topics Concern    Not on file   Social History Narrative    Not on file     Social Determinants of Health     Financial Resource Strain:     Difficulty of Paying Living Expenses: Not on file   Food Insecurity:     Worried About Running Out of Food in the Last Year: Not on file    [place] of Food in the Last Year: Not on file   Transportation Needs:     Lack of Transportation (Medical): Not on file    Lack of Transportation (Non-Medical):  Not on file   Physical Activity:     Days of Exercise per Week: Not on file    Minutes of Exercise per Session: Not on file   Stress:     Feeling of Stress : Not on file   Social Connections:     Frequency of Communication with Friends and Family: Not on file    Frequency of Social Gatherings with Friends and Family: Not on file    Attends Catholic Services: Not on file    Active Member of [address] [place] [address] [employer] or Organizations: Not on file    Attends Club or Organization Meetings: Not on file    Marital Status: Not on file   Intimate Partner Violence:     Fear of Current or Ex-Partner: Not on file    Emotionally Abused: Not on file    Physically Abused: Not on file    Sexually Abused: Not on file   Housing Stability:     Unable to Pay for Housing in the Last Year: Not on file    Number of [place] in the Last Year: Not on file    Unstable Housing in the Last Year: Not on file       Family History   Problem Relation Age of Onset    Diabetes Mother     Mental Illness Mother     Substance Abuse Father        Allergies:  Doxycycline    Home Medications:  Prior to Admission medications    Medication Sig Start Date End Date Taking? Authorizing Provider   levothyroxine (SYNTHROID) 50 MCG tablet Take 50 mcg by mouth Daily    Historical Provider, MD   liothyronine (CYTOMEL) 25 MCG tablet Take 25 mcg by mouth daily    Historical Provider, MD   pregabalin (LYRICA) 100 MG capsule Take 100 mg by mouth 3 times daily. Historical Provider, MD   thyroid (ARMOUR THYROID) 180 MG tablet Take 240 mg by mouth daily Pt takes 240 mg daily    Historical Provider, MD   ibuprofen (ADVIL;MOTRIN) 600 MG tablet Take 1 tablet by mouth every 8 hours as needed for Pain 9/17/21 1/15/22  Nasim Stahl MD   cholestyramine light 4 g packet Take 1 packet by mouth 2 times daily for 7 days 9/8/20 1/15/22  Alyse Vargas MD   famotidine (PEPCID) 20 MG tablet Take 1 tablet by mouth 2 times daily for 14 days 9/8/20 1/15/22  Alyse Vargas MD   folic acid (FOLVITE) 1 MG tablet Take 1 tablet by mouth daily 9/8/20 1/15/22  Alyse Vargas MD       REVIEW OF SYSTEMS    (2-9 systems for level 4, 10 or more for level 5)      Review of Systems   Constitutional: Positive for chills and fatigue. Negative for fever. HENT: Positive for congestion. Respiratory: Negative for cough and shortness of breath. Cardiovascular: Positive for leg swelling. Negative for chest pain. Gastrointestinal: Negative for abdominal pain, nausea and vomiting. Musculoskeletal: Positive for myalgias. Skin: Positive for pallor. Negative for color change and rash. Neurological: Positive for weakness (Generalized). Negative for numbness. Psychiatric/Behavioral: Negative for confusion. PHYSICAL EXAM   (up to 7 for level 4, 8 or more for level 5)     INITIAL VITALS:    oral temperature is 99 °F (37.2 °C). Her blood pressure is 200/113 (abnormal) and her pulse is 100. Her respiration is 18 and oxygen saturation is 96%.      Physical Exam  Constitutional:       Comments: Alert and oriented person, place, time, patient is ill in appearance however no acute distress. Nontoxic in appearance. HENT:      Mouth/Throat:      Mouth: Mucous membranes are moist.      Pharynx: Oropharynx is clear. Eyes:      Extraocular Movements: Extraocular movements intact. Pupils: Pupils are equal, round, and reactive to light. Cardiovascular:      Rate and Rhythm: Regular rhythm. Tachycardia present. Pulses: Normal pulses. Heart sounds: No murmur heard. No gallop. Pulmonary:      Effort: Pulmonary effort is normal. No respiratory distress. Breath sounds: Normal breath sounds. No wheezing. Abdominal:      General: Abdomen is flat. Palpations: Abdomen is soft. Tenderness: There is no abdominal tenderness. There is no guarding or rebound. Musculoskeletal:      Comments: Patient is diffusely tender to palpation over the bilateral lower as well as upper extremities. Compartments soft in the bilateral upper and lower extremities 4/5 strength BUE/BLE. Skin:     Capillary Refill: Capillary refill takes less than 2 seconds. Coloration: Skin is pale. Neurological:      Comments: Cranial nerves II-XII are equal and grossly intact with pupils equal and reactive with bilateral pupillary reflexes intact, no visual field deficits, intact extraocular motion, no facial motor deficit or droop, no facial sensory deficit, symmetrical palate elevation, intact tongue range of motion, good shoulder shrug and good neck range of motion, no finger-nose ataxia, no pronator drift, good hand grasp bilaterally, 4/5 strength in the bilateral upper and lower extremity, no sensory deficit in the upper or lower extremity.            DIFFERENTIAL  DIAGNOSIS     PLAN (LABS / IMAGING / EKG):  Orders Placed This Encounter   Procedures    XR CHEST PORTABLE    CBC with Auto Differential    Comprehensive Metabolic Panel    Troponin    CK    MYOGLOBIN, SERUM    Magnesium    TSH    T3, Free    T4, Free  Inpatient consult to Hospitalist    Inpatient consult to Endocrinology    EKG 12 Lead       MEDICATIONS ORDERED:  Orders Placed This Encounter   Medications    0.9 % sodium chloride bolus    ondansetron (ZOFRAN) injection 4 mg    fentaNYL (SUBLIMAZE) injection 50 mcg    morphine injection 4 mg    potassium bicarb-citric acid (EFFER-K) effervescent tablet 40 mEq       DDX: Hypothyroidism, myxedema, rhabdomyolysis, electrolyte abnormality, viral illness    Initial MDM/Plan: 35 y.o. female who presents with generalized fatigue, malaise, diffuse myalgias and chills consistent with last episode of hospitalization for hypothyroidism. Patient seen and examined, she is. No acute distress, speaking full sentences. Even nonlabored respirations. No signs remarkable for tachycardia 100s as well as hypertension. Oxygen saturation 96% on room air. Diffusely tender to palpation as well as 4/5 weakness in all 4 extremities. Skin is pale but good capillary refill. No obvious cranial nerve deficits. Concern for underlying hypothyroidism however patient does not clinically appear to be in myxedema coma given she is normothermic, not bradycardic, GCS 15. Will obtain thyroid studies, given diffuse myalgias we will also obtain CK myoglobin to evaluate for rhabdomyolysis as well as base electrolytes and troponin.     DIAGNOSTIC RESULTS / EMERGENCY DEPARTMENT COURSE / MDM     LABS:  Labs Reviewed   CBC WITH AUTO DIFFERENTIAL - Abnormal; Notable for the following components:       Result Value    WBC 15.8 (*)     RDW 15.1 (*)     Seg Neutrophils 67 (*)     Immature Granulocytes 1 (*)     Segs Absolute 10.64 (*)     Absolute Lymph # 3.78 (*)     All other components within normal limits   COMPREHENSIVE METABOLIC PANEL - Abnormal; Notable for the following components:    Glucose 137 (*)     CREATININE 0.99 (*)     Potassium 3.1 (*)     AST 68 (*)     All other components within normal limits   CK - Abnormal; Notable for the following components: Total CK 3,184 (*)     All other components within normal limits   MYOGLOBIN, SERUM - Abnormal; Notable for the following components:    Myoglobin 869 (*)     All other components within normal limits   TSH - Abnormal; Notable for the following components:    TSH 65.75 (*)     All other components within normal limits   T4, FREE - Abnormal; Notable for the following components:    Thyroxine, Free 0.26 (*)     All other components within normal limits   TROPONIN   MAGNESIUM   T3, FREE         RADIOLOGY:  XR CHEST PORTABLE    Result Date: 4/27/2022  EXAMINATION: ONE XRAY VIEW OF THE CHEST 4/27/2022 8:42 pm COMPARISON: None. HISTORY: ORDERING SYSTEM PROVIDED HISTORY: tachy TECHNOLOGIST PROVIDED HISTORY: tachy Reason for Exam: upr,tachy,thyroid problems FINDINGS: The lungs are clear, no effusion. No pneumothorax. Heart is normal size. Mediastinal and hilar contours are within normal limits. Bony thorax no acute abnormality. 1. No acute cardiopulmonary abnormality. EKG  EKG Interpretation    Interpreted by me    Rhythm: normal sinus   Rate: normal  Axis: normal  Ectopy: none  Conduction: normal  ST Segments: Nonspecific change  T Waves: Nonspecific flattening aVL, 3, aVF  Q Waves: none    Clinical Impression: Normal sinus rhythm, normal axis, normal intervals, nonspecific ST and T changes    All EKG's are interpreted by the Emergency Department Physician who either signs or Co-signs this chart in the absence of a cardiologist.    EMERGENCY DEPARTMENT COURSE:  ED Course as of 04/27/22 2206 Wed Apr 27, 2022   [address] 40-year-old female with past medical history of hypothyroidism presents with complaints of diffuse myalgias, generalized weakness, fatigue and a sensation of feeling cold. Patient states she has had these symptoms in the past, most recently in February 2022 after which she had to be admitted for adjustment of thyroid medication.   Patient states she just saw her endocrinologist Dr. Arturo Arriaga 2 weeks ago and he increased her thyroid medications. Currently patient is on 200 mg of levothyroxine, 300 mg of [place] thyroid as well as 50 mg of liothyronine. Patient was also recently started on Crestor for hyperlipidemia. Patient states today she has had bilateral lower extremity pain and a sensation of feeling weak to the point she has been having trouble with ambulation. Patient denies chest pain or shortness of breath. [DS]      ED Course User Index  [DS] Christel Perez DO     TSH significantly elevated, T4 0.26 given thyroid derangements we will plan for admission as well as endocrinology consultation for thyroid hormone replacement. Patient also found to be in rhabdomyolysis with elevated myoglobin and CK. She has received 1 L of fluids with improvement in heart rate to the 80s. Patient had improvement in blood pressure after symptomatic control to 154/90. Patient did report improvement in her symptoms. EKG unremarkable, troponin within normal limits, potassium low which was repleted. Given thyroid derangements as well as rhabdomyolysis, internal medicine was paged for admission of the patient. They accepted admission. They requested endocrinology consult which was attempted in the emergency department however unable to reach patient's endocrinologist given after hours. PROCEDURES:  None    CONSULTS:  IP CONSULT TO HOSPITALIST  IP CONSULT TO ENDOCRINOLOGY    CRITICAL CARE:  Please see attending note    FINAL IMPRESSION      1. Hypothyroidism due to Hashimoto's thyroiditis    2. Non-traumatic rhabdomyolysis          DISPOSITION / PLAN     DISPOSITION Decision To Admit 04/27/2022 09:35:28 PM        PATIENTREFERRED TO:  No follow-up provider specified.     DISCHARGE MEDICATIONS:  New Prescriptions    No medications on file       Christel Perez DO  EmergencyMedicine Resident    (Please note that portions of this note were completed with a voice recognition program. Efforts were made to edit the dictations but occasionally words are mis-transcribed.)        Dolores Reedutant, DO  Resident  04/27/22 [address], DO  Resident  04/27/22 3401

## 2022-04-28 NOTE — PROGRESS NOTES
[facility]  Internal Medicine Teaching Residency Program  Inpatient Daily Progress Note  ______________________________________________________________________________    Patient: Mariana Lopez  YOB: 1980   CORIE:[id number]    Acct: [de-identified]     Room: [facility]4955-72  Admit date: 4/27/2022  Today's date: 04/28/22  Number of days in the hospital: 1    SUBJECTIVE   CC: Other (Issues with regulating TSH levels. Body aches/swelling. )    Pt examined at bedside. Chart & results reviewed. - VSS, pt is saturating well on room air. Patient is afebrile, hemodynamically stable with /92, heart rate 69.  - labs reviewed -sodium 139, potassium 3.1- replaced, chloride 101, creatinine 0.99, glucose 137, total CK  3,184, myoglobin 869, free T3 4.11, TSH 65.75, free thyroxine 0.26, WBC 15.8, hemoglobin 13.7, platelet count 182.  - no acute events overnight.   - Patient denies chest pain, cough, abdominal pain, changes in bowel or urinary habits. ROS:    Review of Systems   Constitutional: Positive for unexpected weight change. Negative for appetite change, chills, fatigue and fever. HENT: Negative for ear pain, postnasal drip, rhinorrhea and sore throat. Respiratory: Negative for cough, choking, shortness of breath and wheezing. Cardiovascular: Positive for leg swelling. Negative for chest pain. Gastrointestinal: Positive for constipation, diarrhea and nausea. Negative for abdominal pain, blood in stool and vomiting. Genitourinary: Positive for decreased urine volume. Negative for difficulty urinating and enuresis. Musculoskeletal: Positive for arthralgias, back pain and myalgias. Negative for joint swelling. Neurological: Positive for headaches. Negative for syncope and light-headedness. Psychiatric/Behavioral: The patient is not nervous/anxious.           BRIEF HISTORY   56-year-old  female with underlying Hashimoto's, HLD recently started on statin, DM with neuropathy who  Presented with diffuse myalgias, generailized weakness, tender to touch with little stimuli and chills for the past 2 days. Denies SOB, cough, diarrhea, consitpation, Urinary complaints, fever, night sweats, URI sx, change in voice, difficulty swallowing, leg swelling, headache or vision changes. Denies any sick contacts or recent travel     Patient reports that she has had similar milder episodes in the past and was felt to be due to her chronic inflammatory thyroiditis. For which her medications have been titrated. Recently her 3 thyroid medications were adjusted by her primary care doctor and the patient reports that this is her current baseline. SHes denies confusion, difficulty concentrating.      She reports starting a statin medication recently, since then has noticed body aches. Doesn't take coQ10. She reports not drinking as much fluid as she should.      On admission, she was afebrile, hemodynamically stable on room air. Leukocytosis 15 K, CPK 3100, myoglobin 69. Her potassium, renal function LFTs relatively ~normal. She reported significant improvement with fentanyl and admitted to fluid.     Her thyroid panel showed a normal T3, low T4, elevated TSH. Chest x-ray unrevealing    OBJECTIVE     Vital Signs:  BP (!) 134/1   Pulse 76   Temp 98.1 °F (36.7 °C) (Oral)   Resp 24   SpO2 95%     Temp (24hrs), Av.6 °F (37 °C), Min:98.1 °F (36.7 °C), Max:99 °F (37.2 °C)    No intake/output data recorded. Physical Exam:  Constitutional: This is a well developed, well nourished, 30-34.9 - Obesity Grade I 35y.o. year old female who is alert, oriented, cooperative and in no apparent distress. Head:normocephalic and atraumatic. Respiratory:Breath sounds bilaterally were clear to auscultation. There were no wheezes, rhonchi or rales. There is no intercostal retraction or use of accessory muscles.   Cardiovascular: Regular without murmur, clicks, gallops or rubs. Abdomen: Slightly rounded and soft. No rebound, rigidity or guarding was appreciated. Extremities:  No lower extremity edema, ulcerations, tenderness, varicosities or erythema. No involuntary movements are noted. Skin:  Warm and dry. Good color, turgor and pigmentation. No lesions or scars. No cyanosis or clubbing  Neurological/Psychiatric: The patient's general behavior, level of consciousness, thought content and emotional status is normal.        Medications:  Scheduled Medications:    levothyroxine  50 mcg Oral Daily    liothyronine  25 mcg Oral Daily    pregabalin  100 mg Oral TID    thyroid  240 mg Oral Daily    sodium chloride flush  5-40 mL IntraVENous 2 times per day    enoxaparin  40 mg SubCUTAneous Daily    potassium bicarb-citric acid  40 mEq Oral Daily    cyclobenzaprine  5 mg Oral TID     Continuous Infusions:    sodium chloride      sodium chloride      lactated ringers 125 mL/hr at 04/28/22 0158     PRN Medicationssodium chloride flush, 10 mL, PRN  sodium chloride, , PRN  potassium chloride, 40 mEq, PRN   Or  potassium alternative oral replacement, 40 mEq, PRN   Or  potassium chloride, 10 mEq, PRN  magnesium sulfate, 1,000 mg, PRN  ondansetron, 4 mg, Q8H PRN   Or  ondansetron, 4 mg, Q6H PRN  polyethylene glycol, 17 g, Daily PRN  acetaminophen, 650 mg, Q6H PRN   Or  acetaminophen, 650 mg, Q6H PRN        Diagnostic Labs:  CBC:   Recent Labs     04/27/22 2035   WBC 15.8*   RBC 4.49   HGB 13.7   HCT 40.6   MCV 90.4   RDW 15.1*        BMP:   Recent Labs     04/27/22 2035      K 3.1*      CO2 22   BUN 8   CREATININE 0.99*     BNP: No results for input(s): BNP in the last 72 hours. PT/INR: No results for input(s): PROTIME, INR in the last 72 hours. APTT: No results for input(s): APTT in the last 72 hours. CARDIAC ENZYMES: No results for input(s): CKMB, CKMBINDEX, TROPONINI in the last 72 hours.     Invalid input(s): CKTOTAL;3  FASTING LIPID PANEL:  Lab Results   Component Value Date    CHOL 278 (H) 03/01/2022    HDL 45 03/01/2022    TRIG 249 (H) 03/01/2022     LIVER PROFILE:   Recent Labs     04/27/22 2035   AST 68*   ALT 28   BILITOT 0.31   ALKPHOS 66      MICROBIOLOGY:   Lab Results   Component Value Date/Time    CULTURE NO SIGNIFICANT GROWTH 01/13/2012 01:21 AM    CULTURE  01/13/2012 01:21 AM     Performed at [address] [place] [address] 3 [phone]       Imaging:    XR CHEST PORTABLE    Result Date: 4/27/2022  1. No acute cardiopulmonary abnormality. ASSESSMENT & PLAN     Principal Problem:    Hypothyroidism  Active Problems:    Hashimoto's disease  Resolved Problems:    * No resolved hospital problems. *    Nontraumatic rhabdomyolysis:  -Etiology multifactorial probably due to statin induced rhabdo. Statin held. Fenofibrate held. Patient received 1 L of 0.9% sodium chloride bolus in the ER on 4/27/2022. Continue IV hydration-0.9% sodium chloride at 150 ml per hour. total CK  3,184, myoglobin 869  We will continue to monitor CK. Patient received morphine 4 Mg yesterday and again this morning for pain control. Chronic Hashimoto's thyroiditis:   free T3 4.11, TSH 65.75, free thyroxine 0.26,   Endocrinology consulted  Patient on IV levothyroxine 100 mcg daily. Hypokalemia:  Potassium replaced. DVT Ppx: patient refused lovenox  GI Ppx: Not Indicated  Diet: Regular adult diet  PT/OT: Consulted  Case Management: Consulted    Disposition: Monitor inpatient.      Daniel Parker MD  PGY-1, Internal Medicine Resident  [facility]         4/28/2022, 7:20 AM

## 2022-04-28 NOTE — H&P
@[facility]@    [address] [place] [address]    HISTORY AND PHYSICAL EXAMINATION            Date:   4/27/2022  Patient name:  Quan Linder  Date of admission:  4/27/2022  7:40 PM  MRN:   [id number]  Account:  [de-identified]  YOB: 1980  PCP:    Ana Ann MD  Room:   33/33  Code Status:    Prior    Chief Complaint:     Diffuse body aches, tender to touch through out   _______________________________________________________  ED was Unable to transfer patient to facility. Hallie her Thyroid derrangements were stable and didn't reflect a endocrine emergency given her stability. As per ED, her endocrinologist is often called when shes been admitted here and can provide clarity on her thyroid status. History of Present Illness:   58-year-old  female with underlying Zev Mercy recently started on statin, DM with neuropathy who  Presented with diffuse myalgias, generailized weakness, tender to touch with little stimuli and chills for the past 2 days. Denies SOB, cough, diarrhea, consitpation, Urinary complaints, fever, night sweats, URI sx, change in voice, difficulty swallowing, leg swelling, headache or vision changes. Denies any sick contacts or recent travel    Patient reports that she has had similar milder episodes in the past and was felt to be due to her chronic inflammatory thyroiditis. For which her medications have been titrated. Recently her 3 thyroid medications were adjusted by her primary care doctor and the patient reports that this is her current baseline. SHes denies confusion, difficulty concentrating. She reports starting a statin medication recently, since then has noticed body aches. Doesn't take coQ10. She reports not drinking as much fluid as she should. On admission, she was afebrile, hemodynamically stable on room air. Leukocytosis 15 K, CPK 3100, myoglobin 69.  Her potassium, renal function LFTs relatively ~normal. She reported significant improvement with fentanyl and admitted to fluid. Her thyroid panel showed a normal T3, low T4, elevated TSH. Chest x-ray unrevealing    Past Medical History:     Past Medical History:   Diagnosis Date    Carpal tunnel syndrome     Enlarged thyroid 10/30/2018    H/O D&C (2001) 6/11/2017    H/O LEEP (1997) 6/11/2017    H/O SAB x 4 6/11/2017    Incomplete miscarriage 6/11/2017    Miscarriage     Psychiatric problem     Thyroid disease     Thyroiditis 12/2/2017        Past Surgical History:     Past Surgical History:   Procedure Laterality Date    CHOLECYSTECTOMY      DILATION AND CURETTAGE OF UTERUS      DILATION AND CURETTAGE OF UTERUS  06/11/2017    LEEP      MS INDUCED ABORTN BY DIL/EVAC N/A 6/11/2017    DILATATION AND CURETTAGE SUCTION performed by Ivet Bourne DO at [id number] [place] [address]          Medications Prior to Admission:     Prior to Admission medications    Medication Sig Start Date End Date Taking? Authorizing Provider   levothyroxine (SYNTHROID) 50 MCG tablet Take 50 mcg by mouth Daily    Historical Provider, MD   liothyronine (CYTOMEL) 25 MCG tablet Take 25 mcg by mouth daily    Historical Provider, MD   pregabalin (LYRICA) 100 MG capsule Take 100 mg by mouth 3 times daily.     Historical Provider, MD   thyroid (GERMAN THYROID) 180 MG tablet Take 240 mg by mouth daily Pt takes 240 mg daily    Historical Provider, MD   ibuprofen (ADVIL;MOTRIN) 600 MG tablet Take 1 tablet by mouth every 8 hours as needed for Pain 9/17/21 1/15/22  Yehuda Runner, MD   cholestyramine light 4 g packet Take 1 packet by mouth 2 times daily for 7 days 9/8/20 1/15/22  Park Hartman MD   famotidine (PEPCID) 20 MG tablet Take 1 tablet by mouth 2 times daily for 14 days 9/8/20 1/15/22  Park Hartman MD   folic acid (FOLVITE) 1 MG tablet Take 1 tablet by mouth daily 9/8/20 1/15/22  Park Hartman MD        Allergies: Doxycycline    Social History:     Tobacco:    reports that she has been smoking cigarettes. She has a 11.50 pack-year smoking history. She has never used smokeless tobacco.  Alcohol:      reports no history of alcohol use. Drug Use:  reports no history of drug use. Family History:     Family History   Problem Relation Age of Onset    Diabetes Mother     Mental Illness Mother     Substance Abuse Father        Review of Systems:     Positive and Negative as described in HPI. ROS Negative other then the above stated   Physical Exam:   BP (!) 200/113   Pulse 100   Temp 99 °F (37.2 °C) (Oral)   Resp 18   SpO2 96%   Temp (24hrs), Av °F (37.2 °C), Min:99 °F (37.2 °C), Max:99 °F (37.2 °C)    No results for input(s): POCGLU in the last 72 hours. No intake or output data in the 24 hours ending 22 4951    Physical Exam  Constitutional:       Comments: Alert and oriented person, place, time, patient is ill in appearance however no acute distress. Nontoxic in appearance. HENT:      Mouth/Throat:      Mouth: Mucous membranes are moist.      Pharynx: Oropharynx is clear. Eyes:      Extraocular Movements: Extraocular movements intact. Pupils: Pupils are equal, round, and reactive to light. Cardiovascular:      Rate and Rhythm: Regular rhythm. Tachycardia present. Pulses: Normal pulses. Heart sounds: No murmur heard. No gallop. Pulmonary:      Effort: Pulmonary effort is normal. No respiratory distress. Breath sounds: Normal breath sounds. No wheezing. Abdominal:      General: Abdomen is flat. Palpations: Abdomen is soft. Tenderness: There is no abdominal tenderness. There is no guarding or rebound. Musculoskeletal:      Comments: Patient is diffusely tender to palpation over the bilateral lower as well as upper extremities. Compartments soft in the bilateral upper and lower extremities 4/5 strength BUE/BLE.     Skin:     Capillary Refill: Capillary refill takes less than 2 seconds. Coloration: Skin is pale. Neurological:      Comments: Cranial nerves II-XII are equal and grossly intact with pupils equal and reactive with bilateral pupillary reflexes intact, no visual field deficits, intact extraocular motion, no facial motor deficit or droop, no facial sensory deficit, symmetrical palate elevation, intact tongue range of motion, good shoulder shrug and good neck range of motion, no finger-nose ataxia, no pronator drift, good hand grasp bilaterally, 4/5 strength in the bilateral upper and lower extremity, no sensory deficit in the upper or lower extremity.     Investigations:      Laboratory Testing:  Recent Results (from the past 24 hour(s))   CBC with Auto Differential    Collection Time: 04/27/22  8:35 PM   Result Value Ref Range    WBC 15.8 (H) 3.5 - 11.3 k/uL    RBC 4.49 3.95 - 5.11 m/uL    Hemoglobin 13.7 11.9 - 15.1 g/dL    Hematocrit 40.6 36.3 - 47.1 %    MCV 90.4 82.6 - 102.9 fL    MCH 30.5 25.2 - 33.5 pg    MCHC 33.7 28.4 - 34.8 g/dL    RDW 15.1 (H) 11.8 - 14.4 %    Platelets 795 698 - 663 k/uL    MPV 12.0 8.1 - 13.5 fL    NRBC Automated 0.0 0.0 per 100 WBC    Seg Neutrophils 67 (H) 36 - 65 %    Lymphocytes 24 24 - 43 %    Monocytes 5 3 - 12 %    Eosinophils % 2 1 - 4 %    Basophils 1 0 - 2 %    Immature Granulocytes 1 (H) 0 %    Segs Absolute 10.64 (H) 1.50 - 8.10 k/uL    Absolute Lymph # 3.78 (H) 1.10 - 3.70 k/uL    Absolute Mono # 0.85 0.10 - 1.20 k/uL    Absolute Eos # 0.28 0.00 - 0.44 k/uL    Basophils Absolute 0.16 0.00 - 0.20 k/uL    Absolute Immature Granulocyte 0.08 0.00 - 0.30 k/uL    RBC Morphology ANISOCYTOSIS PRESENT    Comprehensive Metabolic Panel    Collection Time: 04/27/22  8:35 PM   Result Value Ref Range    Glucose 137 (H) 70 - 99 mg/dL    BUN 8 6 - 20 mg/dL    CREATININE 0.99 (H) 0.50 - 0.90 mg/dL    Calcium 9.4 8.6 - 10.4 mg/dL    Sodium 139 135 - 144 mmol/L    Potassium 3.1 (L) 3.7 - 5.3 mmol/L    Chloride 101 98 - 107 mmol/L CO2 22 20 - 31 mmol/L    Anion Gap 16 9 - 17 mmol/L    Alkaline Phosphatase 78 35 - 104 U/L    ALT 28 5 - 33 U/L    AST 68 (H) <32 U/L    Total Bilirubin 0.31 0.3 - 1.2 mg/dL    Total Protein 7.6 6.4 - 8.3 g/dL    Albumin 4.8 3.5 - 5.2 g/dL    Albumin/Globulin Ratio 1.7 1.0 - 2.5    GFR Non-African American >60 >60 mL/min    GFR African American >60 >60 mL/min    GFR Comment         Troponin    Collection Time: 04/27/22  8:35 PM   Result Value Ref Range    Troponin, High Sensitivity 8 0 - 14 ng/L   CK    Collection Time: 04/27/22  8:35 PM   Result Value Ref Range    Total CK 3,184 (H) 26 - 192 U/L   MYOGLOBIN, SERUM    Collection Time: 04/27/22  8:35 PM   Result Value Ref Range    Myoglobin 869 (H) 25 - 58 ng/mL   Magnesium    Collection Time: 04/27/22  8:35 PM   Result Value Ref Range    Magnesium 1.9 1.6 - 2.6 mg/dL   TSH    Collection Time: 04/27/22  8:35 PM   Result Value Ref Range    TSH 65.75 (H) 0.30 - 5.00 uIU/mL   T3, Free    Collection Time: 04/27/22  8:35 PM   Result Value Ref Range    T3, Free 4.11 2.02 - 4.43 pg/mL   T4, Free    Collection Time: 04/27/22  8:35 PM   Result Value Ref Range    Thyroxine, Free 0.26 (L) 0.93 - 1.70 ng/dL       Imaging/Diagnostics:  XR CHEST PORTABLE    Result Date: 4/27/2022  1. No acute cardiopulmonary abnormality. Assessment :      #Nontraumatic Rhabdomyolysis  -Etiology appears to be multifactorial with an element of statin induced rhabdo. Will need a U tox to evaluate for any drug use as she was hypotensive on admission and became normotensive with fentanyl. It is possible to rhabdo pain was resolved and her blood pressure came down or possible autonomic hyperactivity from withdrawal?  Denies any history of drug use.   -At this time is no obvious infectious etiology that could be contributing  -She appears to have a subtle malar rash?,  She reports being evaluated for other autoimmune diseases that have been unrevealing.  -Autoimmune markers reveal negative anti-Nataliya, anti-smooth, double-stranded DNA, PETER, rheumatoid factor antihistone. PLAN  -Continue to hydrate treat symptomatically  -Follow up on CPK, hold the statin. If she downtrends after stopping the statin that is likely larger contributor than expected. -Follow-up on inflammatory markers, cultures, UA  []  -Call her endocrinologist first thing in morning for recs on if any concern for chronic thyroiditis contributing to her presentation as well as reviewing thyroid function labs. -Is very important that the patient is counseled that we do not have an endocrinologist here. It is very important that the endocrinologist knows not to refer her to [facility] due to not having endocrinology services. Is reported that the patient seek hospitals of endocrine specialty prior to coming to [facility]. Furthermore, the emergency department [facility] should be well aware that we do not have endocrinology specialist.      #Chronic Hashimoto Thyroiditis  -Known history. Denies any symptoms of low thyroid.   AO 3, hemodynamically stable at this time.  -Thyroid panel reveals normal T3, low T4 elevated TSH  -We will resume her medications  -As per above

## 2022-04-28 NOTE — ADT AUTH CERT
Utilization Reviews         Musculoskeletal Disease GRG - Care Day 2 (4/28/2022) by Scooter Tanner RN       Review Status Review Entered   Completed 4/28/2022 13:38      Criteria Review      Care Day: 2 Care Date: 4/28/2022 Level of Care: Intermediate Care    Guideline Day 2    Clinical Status    ( ) * No ICU or intermediate care needs    Interventions    (X) Inpatient interventions continue    4/28/2022 1:38 PM EDT by Shira Burnett      daily weight, telemetry, up with assist, I&O, routine vitals, daily labs, reg diet, consult endo, pulse ox checks, pt/ot, nc prn    ( ) Transition to oral routes    4/28/2022 1:38 PM EDT by Shira Burnett      ms 4mg iv  ns 150    * Milestone   Additional Notes   DATE: 4/28 care day 2            Vitals:    98.2 (36.8) 18 80 139/92 96% ra pain 7         Abnl/Pertinent Labs/Radiology/Diagnostic Studies:        CRP                         13.9 (H)          0.0 - 5.0 mg*        Total CK                    2,496 (H)         26 - 192 U/L         Potassium                   3.5 (L)           3.7 - 5.3 mm*             Sed Rate                    29 (H)            0 - 20 mm/Hr         Leukocyte Esterase, Ur*     MODERATE (A)      NEGATIVE             Bacteria, UA                MODERATE (A)      None                  Physical Exam:   Wnl         MD Consults/Assessments & Plans:   ===MEDICINE   Nontraumatic rhabdomyolysis:   -Etiology multifactorial probably due to statin induced rhabdo. Statin held. Fenofibrate held. Patient received 1 L of 0.9% sodium chloride bolus in the ER on 4/27/2022. Continue IV hydration-0.9% sodium chloride at 150 ml per hour. total CK  3,184, myoglobin 869   We will continue to monitor CK.    Patient received morphine 4 Mg yesterday and again this morning for pain control.       Chronic Hashimoto's thyroiditis:    free T3 4.11, TSH 65.75, free thyroxine 0.26,    Endocrinology consulted   Patient on IV levothyroxine 100 mcg daily.       Hypokalemia: Potassium replaced.            DVT Ppx: patient refused lovenox   GI Ppx: Not Indicated   Diet: Regular adult diet   PT/OT: Consulted   Case Management: Consulted         Medications:   Scheduled Meds:pregabalin, 100 mg, Oral, TID   enoxaparin, 40 mg, SubCUTAneous, Daily   levothyroxine, 100 mcg, IntraVENous, Daily   [Held by provider] levothyroxine, 200 mcg, Oral, Daily   [Held by provider] liothyronine, 50 mcg, Oral, Daily   [Held by provider] thyroid, 300 mg, Oral, Daily   potassium bicarb-citric acid, 40 mEq, Oral, Daily   cyclobenzaprine, 5 mg, Oral, TID   Ms 4mg iv x 1   Klor con 40meq po x 1   sodium chloride Last Rate: 150 mL/hr             PT/OT/SLP/CM Assessments or Notes:   DC PLAN - TBD            PA INPT LETTER OF RECOMMENDATION by Dawood Avalos RN       Review Status Review Entered   In [facility] 2022 13:37      Criteria Review   I agree with INPATIENT admission status. Name: Chevy Terry   : 1980   MRN: [id number]  Insurance: [employer]     Date of admission: 22    Clinical summary Nontraumatic Rhabdomyolysis  Vitals   Labs and Imaging CK 3184    High-risk patient with Nontraumatic Rhabdomyolysis requiring continued IV therapies, further evaluation, close monitoring, and ongoing hospital-based care. Care plan is pending on day 2 and therefore do not have enough supportive documentation to recommend a change in status at this point.      [facility] care criteria apply      Patient's chart was reviewed at 11:59 AM 2022    Kaila Shelby MD  Physician [place] [address] [employer]   Cell: [phone]

## 2022-04-29 PROBLEM — B95.2 ENTEROCOCCUS UTI: Status: ACTIVE | Noted: 2022-04-29

## 2022-04-29 PROBLEM — E07.9 THYROID MYOPATHY: Status: ACTIVE | Noted: 2020-09-08

## 2022-04-29 PROBLEM — G72.0 STATIN MYOPATHY: Status: ACTIVE | Noted: 2022-04-29

## 2022-04-29 PROBLEM — N39.0 ENTEROCOCCUS UTI: Status: ACTIVE | Noted: 2022-04-29

## 2022-04-29 PROBLEM — T46.6X5A STATIN MYOPATHY: Status: ACTIVE | Noted: 2022-04-29

## 2022-04-29 LAB
ABSOLUTE EOS #: 0.29 K/UL (ref 0–0.4)
ABSOLUTE IMMATURE GRANULOCYTE: 0 K/UL (ref 0–0.3)
ABSOLUTE LYMPH #: 3.23 K/UL (ref 1–4.8)
ABSOLUTE MONO #: 0.57 K/UL (ref 0.1–0.8)
ANION GAP SERPL CALCULATED.3IONS-SCNC: 10 MMOL/L (ref 9–17)
BASOPHILS # BLD: 0 % (ref 0–2)
BASOPHILS ABSOLUTE: 0 K/UL (ref 0–0.2)
BUN BLDV-MCNC: 9 MG/DL (ref 6–20)
CALCIUM SERPL-MCNC: 8.6 MG/DL (ref 8.6–10.4)
CHLORIDE BLD-SCNC: 105 MMOL/L (ref 98–107)
CO2: 23 MMOL/L (ref 20–31)
CREAT SERPL-MCNC: 0.81 MG/DL (ref 0.5–0.9)
CULTURE: ABNORMAL
EKG ATRIAL RATE: 95 BPM
EKG P AXIS: 58 DEGREES
EKG P-R INTERVAL: 146 MS
EKG Q-T INTERVAL: 348 MS
EKG QRS DURATION: 92 MS
EKG QTC CALCULATION (BAZETT): 437 MS
EKG R AXIS: 60 DEGREES
EKG T AXIS: 48 DEGREES
EKG VENTRICULAR RATE: 95 BPM
EOSINOPHILS RELATIVE PERCENT: 3 % (ref 1–4)
GFR AFRICAN AMERICAN: >60 ML/MIN
GFR NON-AFRICAN AMERICAN: >60 ML/MIN
GFR SERPL CREATININE-BSD FRML MDRD: ABNORMAL ML/MIN/{1.73_M2}
GLUCOSE BLD-MCNC: 122 MG/DL (ref 70–99)
HCT VFR BLD CALC: 36.3 % (ref 36.3–47.1)
HEMOGLOBIN: 12.1 G/DL (ref 11.9–15.1)
IMMATURE GRANULOCYTES: 0 %
INR BLD: 1
LYMPHOCYTES # BLD: 34 % (ref 24–44)
MCH RBC QN AUTO: 31.2 PG (ref 25.2–33.5)
MCHC RBC AUTO-ENTMCNC: 33.3 G/DL (ref 28.4–34.8)
MCV RBC AUTO: 93.6 FL (ref 82.6–102.9)
MONOCYTES # BLD: 6 % (ref 1–7)
MORPHOLOGY: ABNORMAL
NRBC AUTOMATED: 0 PER 100 WBC
PDW BLD-RTO: 15.3 % (ref 11.8–14.4)
PLATELET # BLD: 256 K/UL (ref 138–453)
PMV BLD AUTO: 11.7 FL (ref 8.1–13.5)
POTASSIUM SERPL-SCNC: 3.6 MMOL/L (ref 3.7–5.3)
PROTHROMBIN TIME: 10.5 SEC (ref 9.1–12.3)
RBC # BLD: 3.88 M/UL (ref 3.95–5.11)
SEG NEUTROPHILS: 57 % (ref 36–66)
SEGMENTED NEUTROPHILS ABSOLUTE COUNT: 5.41 K/UL (ref 1.8–7.7)
SODIUM BLD-SCNC: 138 MMOL/L (ref 135–144)
SPECIMEN DESCRIPTION: ABNORMAL
TOTAL CK: 1721 U/L (ref 26–192)
VITAMIN D 25-HYDROXY: 25.3 NG/ML
WBC # BLD: 9.5 K/UL (ref 3.5–11.3)

## 2022-04-29 PROCEDURE — 2580000003 HC RX 258: Performed by: NURSE PRACTITIONER

## 2022-04-29 PROCEDURE — 93010 ELECTROCARDIOGRAM REPORT: CPT | Performed by: INTERNAL MEDICINE

## 2022-04-29 PROCEDURE — 85025 COMPLETE CBC W/AUTO DIFF WBC: CPT

## 2022-04-29 PROCEDURE — 80048 BASIC METABOLIC PNL TOTAL CA: CPT

## 2022-04-29 PROCEDURE — 82550 ASSAY OF CK (CPK): CPT

## 2022-04-29 PROCEDURE — 2060000000 HC ICU INTERMEDIATE R&B

## 2022-04-29 PROCEDURE — 6370000000 HC RX 637 (ALT 250 FOR IP): Performed by: INTERNAL MEDICINE

## 2022-04-29 PROCEDURE — 2580000003 HC RX 258: Performed by: STUDENT IN AN ORGANIZED HEALTH CARE EDUCATION/TRAINING PROGRAM

## 2022-04-29 PROCEDURE — 2500000003 HC RX 250 WO HCPCS: Performed by: STUDENT IN AN ORGANIZED HEALTH CARE EDUCATION/TRAINING PROGRAM

## 2022-04-29 PROCEDURE — 6360000002 HC RX W HCPCS

## 2022-04-29 PROCEDURE — 82306 VITAMIN D 25 HYDROXY: CPT

## 2022-04-29 PROCEDURE — 36415 COLL VENOUS BLD VENIPUNCTURE: CPT

## 2022-04-29 PROCEDURE — 99232 SBSQ HOSP IP/OBS MODERATE 35: CPT | Performed by: INTERNAL MEDICINE

## 2022-04-29 PROCEDURE — 6370000000 HC RX 637 (ALT 250 FOR IP): Performed by: STUDENT IN AN ORGANIZED HEALTH CARE EDUCATION/TRAINING PROGRAM

## 2022-04-29 PROCEDURE — 85610 PROTHROMBIN TIME: CPT

## 2022-04-29 PROCEDURE — 97530 THERAPEUTIC ACTIVITIES: CPT

## 2022-04-29 PROCEDURE — 6370000000 HC RX 637 (ALT 250 FOR IP): Performed by: NURSE PRACTITIONER

## 2022-04-29 PROCEDURE — 97110 THERAPEUTIC EXERCISES: CPT

## 2022-04-29 PROCEDURE — 97116 GAIT TRAINING THERAPY: CPT

## 2022-04-29 RX ORDER — POTASSIUM CHLORIDE 20 MEQ/1
40 TABLET, EXTENDED RELEASE ORAL ONCE
Status: COMPLETED | OUTPATIENT
Start: 2022-04-29 | End: 2022-04-29

## 2022-04-29 RX ORDER — NITROFURANTOIN 25; 75 MG/1; MG/1
100 CAPSULE ORAL EVERY 12 HOURS SCHEDULED
Status: DISCONTINUED | OUTPATIENT
Start: 2022-04-29 | End: 2022-04-29

## 2022-04-29 RX ORDER — 0.9 % SODIUM CHLORIDE 0.9 %
1000 INTRAVENOUS SOLUTION INTRAVENOUS ONCE
Status: COMPLETED | OUTPATIENT
Start: 2022-04-29 | End: 2022-04-29

## 2022-04-29 RX ORDER — AMOXICILLIN 500 MG/1
500 CAPSULE ORAL EVERY 8 HOURS SCHEDULED
Status: DISCONTINUED | OUTPATIENT
Start: 2022-04-29 | End: 2022-04-30 | Stop reason: HOSPADM

## 2022-04-29 RX ORDER — VITAMIN B COMPLEX
1000 TABLET ORAL DAILY
Status: DISCONTINUED | OUTPATIENT
Start: 2022-04-29 | End: 2022-04-30 | Stop reason: HOSPADM

## 2022-04-29 RX ADMIN — SODIUM CHLORIDE, PRESERVATIVE FREE 5 ML: 5 INJECTION INTRAVENOUS at 08:45

## 2022-04-29 RX ADMIN — CYCLOBENZAPRINE 5 MG: 10 TABLET, FILM COATED ORAL at 08:45

## 2022-04-29 RX ADMIN — MORPHINE SULFATE 2 MG: 2 INJECTION, SOLUTION INTRAMUSCULAR; INTRAVENOUS at 20:14

## 2022-04-29 RX ADMIN — THYROID, PORCINE 300 MG: 180 TABLET ORAL at 12:46

## 2022-04-29 RX ADMIN — PREGABALIN 100 MG: 100 CAPSULE ORAL at 08:46

## 2022-04-29 RX ADMIN — MORPHINE SULFATE 2 MG: 2 INJECTION, SOLUTION INTRAMUSCULAR; INTRAVENOUS at 08:49

## 2022-04-29 RX ADMIN — CYCLOBENZAPRINE 5 MG: 10 TABLET, FILM COATED ORAL at 16:08

## 2022-04-29 RX ADMIN — SODIUM CHLORIDE 1000 ML: 9 INJECTION, SOLUTION INTRAVENOUS at 10:54

## 2022-04-29 RX ADMIN — SODIUM CHLORIDE: 9 INJECTION, SOLUTION INTRAVENOUS at 02:58

## 2022-04-29 RX ADMIN — SODIUM CHLORIDE, PRESERVATIVE FREE 10 ML: 5 INJECTION INTRAVENOUS at 08:46

## 2022-04-29 RX ADMIN — CYCLOBENZAPRINE 5 MG: 10 TABLET, FILM COATED ORAL at 20:14

## 2022-04-29 RX ADMIN — SODIUM CHLORIDE, PRESERVATIVE FREE 10 ML: 5 INJECTION INTRAVENOUS at 16:08

## 2022-04-29 RX ADMIN — LEVOTHYROXINE SODIUM ANHYDROUS 100 MCG: 100 INJECTION, POWDER, LYOPHILIZED, FOR SOLUTION INTRAVENOUS at 08:45

## 2022-04-29 RX ADMIN — SODIUM CHLORIDE, PRESERVATIVE FREE 10 ML: 5 INJECTION INTRAVENOUS at 20:14

## 2022-04-29 RX ADMIN — AMOXICILLIN 500 MG: 500 CAPSULE ORAL at 20:14

## 2022-04-29 RX ADMIN — SODIUM CHLORIDE: 9 INJECTION, SOLUTION INTRAVENOUS at 20:26

## 2022-04-29 RX ADMIN — AMOXICILLIN 500 MG: 500 CAPSULE ORAL at 16:08

## 2022-04-29 RX ADMIN — MORPHINE SULFATE 2 MG: 2 INJECTION, SOLUTION INTRAMUSCULAR; INTRAVENOUS at 16:12

## 2022-04-29 RX ADMIN — PREGABALIN 100 MG: 100 CAPSULE ORAL at 16:08

## 2022-04-29 RX ADMIN — PREGABALIN 100 MG: 100 CAPSULE ORAL at 20:14

## 2022-04-29 RX ADMIN — AMOXICILLIN 500 MG: 500 CAPSULE ORAL at 10:55

## 2022-04-29 RX ADMIN — POTASSIUM CHLORIDE 40 MEQ: 1500 TABLET, EXTENDED RELEASE ORAL at 08:49

## 2022-04-29 RX ADMIN — Medication 1000 UNITS: at 12:46

## 2022-04-29 ASSESSMENT — PAIN SCALES - GENERAL
PAINLEVEL_OUTOF10: 6
PAINLEVEL_OUTOF10: 6
PAINLEVEL_OUTOF10: 7
PAINLEVEL_OUTOF10: 7

## 2022-04-29 ASSESSMENT — ENCOUNTER SYMPTOMS
CONSTIPATION: 1
ABDOMINAL PAIN: 0
COUGH: 0
DIARRHEA: 1
VOMITING: 0
RHINORRHEA: 0
BLOOD IN STOOL: 0
CHOKING: 0
SHORTNESS OF BREATH: 0
NAUSEA: 1
WHEEZING: 0
BACK PAIN: 1
SORE THROAT: 0

## 2022-04-29 ASSESSMENT — PAIN DESCRIPTION - DESCRIPTORS: DESCRIPTORS: DISCOMFORT;POUNDING

## 2022-04-29 ASSESSMENT — PAIN DESCRIPTION - LOCATION
LOCATION: FOOT
LOCATION: ARM;LEG
LOCATION: LEG

## 2022-04-29 ASSESSMENT — PAIN DESCRIPTION - ORIENTATION
ORIENTATION: RIGHT;LEFT
ORIENTATION: LEFT;RIGHT

## 2022-04-29 NOTE — CARE COORDINATION
Case Management Initial Discharge Plan  Cherrie Maki,             Met with:patient to discuss discharge plans. Information verified: address, contacts, phone number, , insurance Yes  Insurance Provider: [id number]    Emergency Contact/Next of Kin name & number:   Chely Jean-Baptiste        Spouse    [phone]       Who are involved in patient's support system? ,     PCP: Joyce Stewart MD  Date of last visit: 8 months      Discharge Planning    Living Arrangements:        Home has 2 stories  3 stairs to climb to get into front door, 0stairs to climb to reach second floor  Location of bedroom/bathroom in home main    Patient able to perform ADL's:Independent    Current Services (outpatient & in home) none  DME equipment: none  DME provider: none    Is patient receiving oral anticoagulation therapy? No    If indicated:   Physician managing anticoagulation treatment: n/a  Where does patient obtain lab work for ATC treatment? [facility]    Does patient have any issues/concerns obtaining medications? No  If yes, what are patient's concerns? Is there a preferred Pharmacy after hours or on weekends? No    If yes, which pharmacy? Potential Assistance Needed:       Patient agreeable to home care: No  [place] of choice provided:  n/a    Prior SNF/Rehab Placement and Facility: none  Agreeable to SNF/Rehab: No  [place] of choice provided: n/a     Evaluation: no    Expected Discharge date:       Patient expects to be discharged to:        If home: is the family and/or caregiver wiling & able to provide support at home? yes  Who will be providing this support? *    Follow Up Appointment: Best Day/ Time:      Transportation provider: family  Transportation arrangements needed for discharge: No    Readmission Risk              Risk of Unplanned Readmission:  14             Does patient have a readmission risk score greater than 14?: No  If yes, follow-up appointment must be made within 7 days of discharge.      Goals of Care: feel better get legs stronger      Educated pt on transitional options, provided freedom of choice and are agreeable with plan      Discharge Plan: May need walker, has transportation          Electronically signed by Ru Villa RN on 4/29/22 at 5:05 PM EDT

## 2022-04-29 NOTE — PROGRESS NOTES
Physical Therapy  Facility/Department: [facility] NEURO  Daily Treatment Note  NAME: Temitope Carrillo  : 1980  MRN: [id number]    Date of Service: 2022    Discharge Recommendations:  Patient would benefit from continued therapy after discharge,Continue to assess pending progress . Further therapy recommended at discharge. For stair traimning       Patient Diagnosis(es): The primary encounter diagnosis was Hypothyroidism due to Hashimoto's thyroiditis. A diagnosis of Non-traumatic rhabdomyolysis was also pertinent to this visit. Assessment   Assessment: Pt presents with abilty to ambulate ad wade with painful joints and inabilty to negotiate steps due to severe pain and weakness. Pt will benefit from further PT to progress activity and promote safety on steps     Plan    Plan  Plan: 5-7 times per week  Current Treatment Recommendations: Strengthening;Balance training;Functional mobility training;Transfer training; Endurance training;Gait training;Stair training;Patient/Caregiver education & training; Safety education & training; Therapeutic activities     Subjective    Subjective  Subjective: Pt awake, alert sitting at EOB in agreement with PT intervention  Pain: 7/10 bilateral knees and calf , general muscle pain with weakness to bilateral hands  Orientation  Overall Orientation Status: Within Functional Limits  Cognition  Overall Cognitive Status: WFL     Objective   Vitals     Bed Mobility Training  Bed Mobility Training: Yes  Overall Level of Assistance: Independent  Interventions: Demonstration  Rolling: Independent  Supine to Sit: Independent  Sit to Supine: Independent  Scooting: Independent  Balance  Sitting: Intact  Standing: Intact  Transfer Training  Transfer Training: Yes  Overall Level of Assistance: Stand-by assistance  Interventions: Verbal cues  Sit to Stand: Independent  Stand to Sit: Independent  Stand Pivot Transfers: Independent  Bed to Chair: Independent  Toilet Transfer: Independent  Gait Training  Gait Training: Yes  Right Side Weight Bearing: Full  Left Side Weight Bearing: Full  Gait  Overall Level of Assistance: Stand-by assistance  Interventions: Safety awareness training;Visual cues;Demonstration  Base of Support: Widened  Speed/Nena: Pace decreased (< 100 feet/min)  Gait Abnormalities: Antalgic  Distance (ft): 300 Feet (pt up ad wade, walks outside several x/ day independently without AD to smoke)  Assistive Device:  (none, pt denies need for AD)  Number of Stairs Trained: 1 (Pt attempted 1 step, unable to step up step 2/2 pain)  Uneven Terrain - Level of Assistance:  Total assistance     PT Exercises  Exercise Treatment: Pt provided verbal, practical and written instruction in seated bialteral U/LE ROM exercises for general stretching, ROM and strengthening as tolerated,        Patient Education  Education Given To: Patient  Education Provided: Role of Therapy;Plan of Care;Home Exercise Program;Transfer Training;Energy Conservation  Education Method: Demonstration;Verbal  Education Outcome: Verbalized understanding;Demonstrated understanding    Goals  Short Term Goals  Time Frame for Short term goals: 14 visits  Short term goal 1: Pt to ambulate 300ft independently without device  Short term goal 2: Pt to sit <> stand transfer independently  Short term goal 3: Pt to demonstrate independent bed mobility  Short term goal 4: Pt to ascend/descend flight of stairs independently  Short term goal 5: Pt to demonstrate good standing balance       AM-PAC Score  AM-PAC Inpatient Mobility Raw Score : 19 (04/28/22 1550)    Therapy Time   Individual Concurrent Group Co-treatment   Time In 1046         Time Out 1125         Minutes 39         Timed Code Treatment Minutes: [address]       Estes Memory PT, DPT

## 2022-04-29 NOTE — PLAN OF CARE
Problem: Discharge Planning  Goal: Discharge to home or other facility with appropriate resources  Outcome: Progressing     Problem: Pain  Goal: Verbalizes/displays adequate comfort level or baseline comfort level  Outcome: Progressing     Problem: ABCDS Injury Assessment  Goal: Absence of physical injury  Outcome: Progressing   Electronically signed by Enrico Kehr, RN on 4/29/2022 at 7:04 PM

## 2022-04-29 NOTE — PROGRESS NOTES
[facility]  Internal Medicine Teaching Residency Program  Inpatient Daily Progress Note  ______________________________________________________________________________    Patient: Shahram Horta  YOB: 1980   HLI:[id number]    Acct: [de-identified]     Room: [address] [place] [address]  Admit date: 4/27/2022  Today's date: 04/29/22  Number of days in the hospital: 2    SUBJECTIVE   CC: Other (Issues with regulating TSH levels. Body aches/swelling. )    Pt examined at bedside. Chart & results reviewed. No acute episodes overnight   Patient is heme stable and afebrile  Vitals reviewed. Slightly elevated but most likely related to agitation. Muscle aches and pain slightly improved  Patient is able to walk but is moving gingerly  AM CBC and BMP reviewed. CK trending down  UA positive for enterococcus - Started on amoxicillin every 8 hours  Urine output 600 mL over last 24 hours - Will give 1 liter bolus  Vitamin D replaced     ROS:  Review of Systems   Constitutional: Positive for unexpected weight change. Negative for appetite change, chills, fatigue and fever. HENT: Negative for ear pain, postnasal drip, rhinorrhea and sore throat. Respiratory: Negative for cough, choking, shortness of breath and wheezing. Cardiovascular: Positive for leg swelling. Negative for chest pain. Gastrointestinal: Positive for constipation, diarrhea and nausea. Negative for abdominal pain, blood in stool and vomiting. Genitourinary: Positive for decreased urine volume. Negative for difficulty urinating and enuresis. Musculoskeletal: Positive for arthralgias, back pain and myalgias. Negative for joint swelling. Neurological: Positive for headaches. Negative for syncope and light-headedness. Psychiatric/Behavioral: The patient is not nervous/anxious.       BRIEF HISTORY   49-year-old  female with underlying Hashimoto's, HLD recently started on statin, DM with neuropathy who  Presented with diffuse myalgias, generailized weakness, tender to touch with little stimuli and chills for the past 2 days. Denies SOB, cough, diarrhea, consitpation, Urinary complaints, fever, night sweats, URI sx, change in voice, difficulty swallowing, leg swelling, headache or vision changes. Denies any sick contacts or recent travel     Patient reports that she has had similar milder episodes in the past and was felt to be due to her chronic inflammatory thyroiditis. For which her medications have been titrated. Recently her 3 thyroid medications were adjusted by her primary care doctor and the patient reports that this is her current baseline. SHes denies confusion, difficulty concentrating.      She reports starting a statin medication recently, since then has noticed body aches. Doesn't take coQ10. She reports not drinking as much fluid as she should.      On admission, she was afebrile, hemodynamically stable on room air. Leukocytosis 15 K, CPK 3100, myoglobin 69. Her potassium, renal function LFTs relatively ~normal. She reported significant improvement with fentanyl and admitted to fluid.     Her thyroid panel showed a normal T3, low T4, elevated TSH. Chest x-ray unrevealing    OBJECTIVE     Vital Signs:  BP (!) 142/91   Pulse 85   Temp 97.8 °F (36.6 °C) (Oral)   Resp 18   SpO2 94%     Temp (24hrs), Av °F (36.7 °C), Min:97.5 °F (36.4 °C), Max:98.2 °F (36.8 °C)    In: 3267.3   Out: 600 [Urine:600]    Physical Exam:  Constitutional: This is a well developed, well nourished, 30-34.9 - Obesity Grade I 35y.o. year old female who is alert, oriented, cooperative and in no apparent distress. Head:normocephalic and atraumatic. Respiratory:Breath sounds bilaterally were clear to auscultation. There were no wheezes, rhonchi or rales. There is no intercostal retraction or use of accessory muscles. Cardiovascular: Regular without murmur, clicks, gallops or rubs.    Abdomen: Slightly rounded and soft. No rebound, rigidity or guarding was appreciated. Extremities:  No lower extremity edema, ulcerations, tenderness, varicosities or erythema. No involuntary movements are noted. Skin:  Warm and dry. Good color, turgor and pigmentation. No lesions or scars.   No cyanosis or clubbing  Neurological/Psychiatric: The patient's general behavior, level of consciousness, thought content and emotional status is normal.        Medications:  Scheduled Medications:    amoxicillin  500 mg Oral 3 times per day    sodium chloride  1,000 mL IntraVENous Once    thyroid  300 mg Oral Daily    Vitamin D  1,000 Units Oral Daily    pregabalin  100 mg Oral TID    sodium chloride flush  5-40 mL IntraVENous 2 times per day    enoxaparin  40 mg SubCUTAneous Daily    levothyroxine  100 mcg IntraVENous Daily    And    sodium chloride (PF)  5 mL IntraVENous Daily    [Held by provider] levothyroxine  200 mcg Oral Daily    [Held by provider] liothyronine  50 mcg Oral Daily    cyclobenzaprine  5 mg Oral TID     Continuous Infusions:    sodium chloride      sodium chloride Stopped (04/29/22 1051)     PRN Medicationssodium chloride flush, 10 mL, PRN  sodium chloride, , PRN  potassium chloride, 40 mEq, PRN   Or  potassium alternative oral replacement, 40 mEq, PRN   Or  potassium chloride, 10 mEq, PRN  magnesium sulfate, 1,000 mg, PRN  ondansetron, 4 mg, Q8H PRN   Or  ondansetron, 4 mg, Q6H PRN  polyethylene glycol, 17 g, Daily PRN  acetaminophen, 650 mg, Q6H PRN   Or  acetaminophen, 650 mg, Q6H PRN  morphine, 2 mg, Q4H PRN      Diagnostic Labs:  CBC:   Recent Labs     04/27/22 2035 04/28/22  0938 04/29/22  0420   WBC 15.8* 8.9 9.5   RBC 4.49 4.28 3.88*   HGB 13.7 13.1 12.1   HCT 40.6 38.6 36.3   MCV 90.4 90.2 93.6   RDW 15.1* 15.2* 15.3*    256 256     BMP:   Recent Labs     04/27/22 2035 04/28/22  0938 04/29/22  0420    138 138   K 3.1* 3.5* 3.6*    104 105   CO2 22 25 23   BUN 8 7 9 CREATININE 0.99* 0.81 0.81     BNP: No results for input(s): BNP in the last 72 hours. PT/INR:   Recent Labs     04/29/22  0420   PROTIME 10.5   INR 1.0     APTT: No results for input(s): APTT in the last 72 hours. CARDIAC ENZYMES: No results for input(s): CKMB, CKMBINDEX, TROPONINI in the last 72 hours. Invalid input(s): CKTOTAL;3  FASTING LIPID PANEL:  Lab Results   Component Value Date    CHOL 278 (H) 03/01/2022    HDL 45 03/01/2022    TRIG 249 (H) 03/01/2022     LIVER PROFILE:   Recent Labs     04/27/22 2035 04/28/22  0938   AST 68* 79*   ALT 28 38*   BILITOT 0.31 0.44   ALKPHOS 78 71      MICROBIOLOGY:   Lab Results   Component Value Date/Time    CULTURE  04/28/2022 10:45 AM     PRESUMPTIVE ID: GROUP D ENTEROCOCCUS >[id number] CFU/ML     Imaging:    XR CHEST PORTABLE    Result Date: 4/27/2022  1. No acute cardiopulmonary abnormality. ASSESSMENT & PLAN     Principal Problem:    Non-traumatic rhabdomyolysis  Active Problems:    Hashimoto's disease    Statin myopathy    Enterococcus UTI    Severe hypothyroidism    Obesity (BMI 30-39. 9)    Hypothyroid myopathy  Resolved Problems:    * No resolved hospital problems. *    Nontraumatic rhabdomyolysis:  -Etiology multifactorial probably due to statin induced rhabdo. -Statin held. Fenofibrate held. -CK trending down   -Urine output not at goal of 200 mL/hr  -Given 1 liters bolus of normal saline    Chronic Hashimoto's thyroiditis:  -Endocrinology consulted. Recommendations appreciated. -Continue IV Levothyroxine 100 mcg daily and [place] 200 mg PO Daily  -Synthroid 200 mcg PO daily and Cytomel 50 mg PO daily held     Urinary Tract Infection  -Patient asymptomatic   -Urine culture positive for Group D Enterococcus  -Started on Amoxicillin 500 mg TID for 5 days     Vitamin D Deficiency  -Vit D 25.3  -Continue 1,000 units daily on discharge     Hypokalemia:  -Potassium replaced. DVT Ppx: EPC Cuffs (Lovenox deferred by patient.  She is up and about walking around the unit)    PT/OT: PT deferred. OT working with patient  Case Management: Consulted. Anticipate discharge to home in a day or two.     Kath Linda MD  PGY-2, Internal Medicine Resident  [address]         4/29/2022, 11:52 AM

## 2022-04-30 VITALS
DIASTOLIC BLOOD PRESSURE: 89 MMHG | HEART RATE: 85 BPM | OXYGEN SATURATION: 98 % | TEMPERATURE: 98.8 F | SYSTOLIC BLOOD PRESSURE: 153 MMHG | RESPIRATION RATE: 16 BRPM

## 2022-04-30 LAB
ABSOLUTE EOS #: 0.42 K/UL (ref 0–0.44)
ABSOLUTE IMMATURE GRANULOCYTE: 0.06 K/UL (ref 0–0.3)
ABSOLUTE LYMPH #: 4.11 K/UL (ref 1.1–3.7)
ABSOLUTE MONO #: 0.55 K/UL (ref 0.1–1.2)
ANION GAP SERPL CALCULATED.3IONS-SCNC: 12 MMOL/L (ref 9–17)
BASOPHILS # BLD: 2 % (ref 0–2)
BASOPHILS ABSOLUTE: 0.16 K/UL (ref 0–0.2)
BUN BLDV-MCNC: 10 MG/DL (ref 6–20)
CALCIUM SERPL-MCNC: 8.5 MG/DL (ref 8.6–10.4)
CHLORIDE BLD-SCNC: 104 MMOL/L (ref 98–107)
CO2: 21 MMOL/L (ref 20–31)
CREAT SERPL-MCNC: 0.69 MG/DL (ref 0.5–0.9)
CULTURE: NORMAL
EOSINOPHILS RELATIVE PERCENT: 4 % (ref 1–4)
GFR AFRICAN AMERICAN: >60 ML/MIN
GFR NON-AFRICAN AMERICAN: >60 ML/MIN
GFR SERPL CREATININE-BSD FRML MDRD: ABNORMAL ML/MIN/{1.73_M2}
GLUCOSE BLD-MCNC: 120 MG/DL (ref 70–99)
HCT VFR BLD CALC: 38.8 % (ref 36.3–47.1)
HEMOGLOBIN: 13 G/DL (ref 11.9–15.1)
IMMATURE GRANULOCYTES: 1 %
LYMPHOCYTES # BLD: 38 % (ref 24–43)
MCH RBC QN AUTO: 30.5 PG (ref 25.2–33.5)
MCHC RBC AUTO-ENTMCNC: 33.5 G/DL (ref 28.4–34.8)
MCV RBC AUTO: 91.1 FL (ref 82.6–102.9)
MONOCYTES # BLD: 5 % (ref 3–12)
MYOGLOBIN: 57 NG/ML (ref 25–58)
NRBC AUTOMATED: 0 PER 100 WBC
PDW BLD-RTO: 15.3 % (ref 11.8–14.4)
PLATELET # BLD: 243 K/UL (ref 138–453)
PMV BLD AUTO: 11.7 FL (ref 8.1–13.5)
POTASSIUM SERPL-SCNC: 3.2 MMOL/L (ref 3.7–5.3)
RBC # BLD: 4.26 M/UL (ref 3.95–5.11)
RBC # BLD: ABNORMAL 10*6/UL
SEG NEUTROPHILS: 50 % (ref 36–65)
SEGMENTED NEUTROPHILS ABSOLUTE COUNT: 5.61 K/UL (ref 1.5–8.1)
SODIUM BLD-SCNC: 137 MMOL/L (ref 135–144)
SPECIMEN DESCRIPTION: NORMAL
T3 FREE: 3.83 PG/ML (ref 2.02–4.43)
THYROXINE, FREE: 0.52 NG/DL (ref 0.93–1.7)
TOTAL CK: 1250 U/L (ref 26–192)
TSH SERPL DL<=0.05 MIU/L-ACNC: 43.61 UIU/ML (ref 0.3–5)
WBC # BLD: 10.9 K/UL (ref 3.5–11.3)

## 2022-04-30 PROCEDURE — 6370000000 HC RX 637 (ALT 250 FOR IP): Performed by: INTERNAL MEDICINE

## 2022-04-30 PROCEDURE — 36415 COLL VENOUS BLD VENIPUNCTURE: CPT

## 2022-04-30 PROCEDURE — 84443 ASSAY THYROID STIM HORMONE: CPT

## 2022-04-30 PROCEDURE — 82550 ASSAY OF CK (CPK): CPT

## 2022-04-30 PROCEDURE — 2580000003 HC RX 258: Performed by: STUDENT IN AN ORGANIZED HEALTH CARE EDUCATION/TRAINING PROGRAM

## 2022-04-30 PROCEDURE — 84481 FREE ASSAY (FT-3): CPT

## 2022-04-30 PROCEDURE — 6370000000 HC RX 637 (ALT 250 FOR IP): Performed by: STUDENT IN AN ORGANIZED HEALTH CARE EDUCATION/TRAINING PROGRAM

## 2022-04-30 PROCEDURE — 80048 BASIC METABOLIC PNL TOTAL CA: CPT

## 2022-04-30 PROCEDURE — 85025 COMPLETE CBC W/AUTO DIFF WBC: CPT

## 2022-04-30 PROCEDURE — 83874 ASSAY OF MYOGLOBIN: CPT

## 2022-04-30 PROCEDURE — 84439 ASSAY OF FREE THYROXINE: CPT

## 2022-04-30 PROCEDURE — 6360000002 HC RX W HCPCS: Performed by: STUDENT IN AN ORGANIZED HEALTH CARE EDUCATION/TRAINING PROGRAM

## 2022-04-30 PROCEDURE — 6370000000 HC RX 637 (ALT 250 FOR IP): Performed by: NURSE PRACTITIONER

## 2022-04-30 PROCEDURE — 2500000003 HC RX 250 WO HCPCS: Performed by: STUDENT IN AN ORGANIZED HEALTH CARE EDUCATION/TRAINING PROGRAM

## 2022-04-30 PROCEDURE — 99232 SBSQ HOSP IP/OBS MODERATE 35: CPT | Performed by: INTERNAL MEDICINE

## 2022-04-30 PROCEDURE — 83516 IMMUNOASSAY NONANTIBODY: CPT

## 2022-04-30 PROCEDURE — 6370000000 HC RX 637 (ALT 250 FOR IP)

## 2022-04-30 PROCEDURE — 82784 ASSAY IGA/IGD/IGG/IGM EACH: CPT

## 2022-04-30 RX ORDER — VITAMIN B COMPLEX
1000 TABLET ORAL DAILY
Qty: 29 TABLET | Refills: 3 | Status: SHIPPED | OUTPATIENT
Start: 2022-05-01 | End: 2022-08-25

## 2022-04-30 RX ORDER — AMOXICILLIN 500 MG/1
500 CAPSULE ORAL EVERY 8 HOURS SCHEDULED
Qty: 12 CAPSULE | Refills: 0 | Status: SHIPPED | OUTPATIENT
Start: 2022-04-30 | End: 2022-05-04

## 2022-04-30 RX ORDER — FOLIC ACID 1 MG/1
1 TABLET ORAL DAILY
Qty: 30 TABLET | Refills: 0 | Status: SHIPPED | OUTPATIENT
Start: 2022-04-30 | End: 2022-07-29

## 2022-04-30 RX ORDER — FAMOTIDINE 20 MG/1
20 TABLET, FILM COATED ORAL 2 TIMES DAILY
Qty: 28 TABLET | Refills: 0 | Status: SHIPPED | OUTPATIENT
Start: 2022-04-30 | End: 2022-05-14

## 2022-04-30 RX ORDER — POTASSIUM CHLORIDE 7.45 MG/ML
10 INJECTION INTRAVENOUS
Status: DISCONTINUED | OUTPATIENT
Start: 2022-04-30 | End: 2022-04-30

## 2022-04-30 RX ORDER — AMLODIPINE BESYLATE 5 MG/1
5 TABLET ORAL DAILY
Status: DISCONTINUED | OUTPATIENT
Start: 2022-04-30 | End: 2022-04-30

## 2022-04-30 RX ORDER — POTASSIUM CHLORIDE 7.45 MG/ML
10 INJECTION INTRAVENOUS
Status: COMPLETED | OUTPATIENT
Start: 2022-04-30 | End: 2022-04-30

## 2022-04-30 RX ORDER — LEVOTHYROXINE SODIUM 0.2 MG/1
200 TABLET ORAL DAILY
Qty: 30 TABLET | Refills: 3 | Status: SHIPPED | OUTPATIENT
Start: 2022-04-30

## 2022-04-30 RX ORDER — CYCLOBENZAPRINE HCL 5 MG
5 TABLET ORAL 3 TIMES DAILY
Qty: 30 TABLET | Refills: 0 | Status: SHIPPED | OUTPATIENT
Start: 2022-04-30 | End: 2022-05-10

## 2022-04-30 RX ORDER — LIOTHYRONINE SODIUM 50 UG/1
50 TABLET ORAL DAILY
Qty: 30 TABLET | Refills: 3 | Status: SHIPPED | OUTPATIENT
Start: 2022-04-30

## 2022-04-30 RX ADMIN — LEVOTHYROXINE SODIUM ANHYDROUS 100 MCG: 100 INJECTION, POWDER, LYOPHILIZED, FOR SOLUTION INTRAVENOUS at 10:01

## 2022-04-30 RX ADMIN — PREGABALIN 100 MG: 100 CAPSULE ORAL at 12:52

## 2022-04-30 RX ADMIN — AMLODIPINE BESYLATE 5 MG: 5 TABLET ORAL at 10:01

## 2022-04-30 RX ADMIN — CYCLOBENZAPRINE 5 MG: 10 TABLET, FILM COATED ORAL at 08:17

## 2022-04-30 RX ADMIN — PREGABALIN 100 MG: 100 CAPSULE ORAL at 08:17

## 2022-04-30 RX ADMIN — SODIUM CHLORIDE: 9 INJECTION, SOLUTION INTRAVENOUS at 09:00

## 2022-04-30 RX ADMIN — AMOXICILLIN 500 MG: 500 CAPSULE ORAL at 05:48

## 2022-04-30 RX ADMIN — SODIUM CHLORIDE, PRESERVATIVE FREE 5 ML: 5 INJECTION INTRAVENOUS at 10:01

## 2022-04-30 RX ADMIN — POTASSIUM CHLORIDE 10 MEQ: 7.46 INJECTION, SOLUTION INTRAVENOUS at 11:55

## 2022-04-30 RX ADMIN — POTASSIUM CHLORIDE 10 MEQ: 7.46 INJECTION, SOLUTION INTRAVENOUS at 12:51

## 2022-04-30 RX ADMIN — CYCLOBENZAPRINE 5 MG: 10 TABLET, FILM COATED ORAL at 13:59

## 2022-04-30 RX ADMIN — POTASSIUM CHLORIDE 10 MEQ: 7.46 INJECTION, SOLUTION INTRAVENOUS at 13:59

## 2022-04-30 RX ADMIN — Medication 1000 UNITS: at 08:17

## 2022-04-30 RX ADMIN — AMOXICILLIN 500 MG: 500 CAPSULE ORAL at 12:52

## 2022-04-30 RX ADMIN — POTASSIUM CHLORIDE 10 MEQ: 7.46 INJECTION, SOLUTION INTRAVENOUS at 10:55

## 2022-04-30 RX ADMIN — POTASSIUM CHLORIDE 10 MEQ: 7.46 INJECTION, SOLUTION INTRAVENOUS at 09:03

## 2022-04-30 RX ADMIN — THYROID, PORCINE 300 MG: 180 TABLET ORAL at 08:16

## 2022-04-30 NOTE — CARE COORDINATION
Transitional planning:  Reviewed PT notes with internal medicine, Dr. Belen Rowan, does not state any recommendation for walker. [address] [facility] [address] [address], RN, reviewed above with her. Agrees with above. No walker ordered. Does not qualify for one.

## 2022-04-30 NOTE — PLAN OF CARE
Problem: Discharge Planning  Goal: Discharge to home or other facility with appropriate resources  4/29/2022 2149 by Jessica Ch, RN  Outcome: Progressing  4/29/2022 1904 by Lian Sim RN  Outcome: Progressing     Problem: Pain  Goal: Verbalizes/displays adequate comfort level or baseline comfort level  4/29/2022 2149 by Jessica Ch, RN  Outcome: Progressing  4/29/2022 1904 by Lian Sim RN  Outcome: Progressing     Problem: ABCDS Injury Assessment  Goal: Absence of physical injury  4/29/2022 2149 by Jessica Ch, RN  Outcome: Progressing  4/29/2022 1904 by Lian Sim RN  Outcome: Progressing     Problem: Safety - Adult  Goal: Free from fall injury  Outcome: Progressing

## 2022-04-30 NOTE — PROGRESS NOTES
[facility]  Internal Medicine Teaching Residency Program  Inpatient Daily Progress Note  ______________________________________________________________________________    Patient: Branden Plascencia  YOB: 1980   ENH:[id number]    Acct: [de-identified]     Room: [facility]62-15  Admit date: 4/27/2022  Today's date: 04/30/22  Number of days in the hospital: 3    SUBJECTIVE   CC: Other (Issues with regulating TSH levels. Body aches/swelling. )    Pt examined at bedside. Chart & results reviewed. - VSS, pt is saturating well on room air.   - Patient is afebrile, hemodynamically stable with BP- 147/96, heart rate 85.  - labs reviewed -sodium 137, potassium 3.0 -potassium replaced, chloride 104, glucose 120, free T3 3.83, TSH is 43.61, decreased from 65.75, free thyroxine 0.52- increased from 0.26.      - no acute events overnight.   - Patient denies headache,  nausea, vomiting, chest pain,  abdominal pain, changes in bowel or urinary habits.     ROS:  General ROS: Completed and except as mentioned above were negative   HEENT ROS: Completed and except as mentioned above were negative   Allergy and Immunology ROS:  Completed and except as mentioned above were negative  Hematological and Lymphatic ROS:  Completed and except as mentioned above were negative  Respiratory ROS:  Completed and except as mentioned above were negative  Cardiovascular ROS:  Completed and except as mentioned above were negative  Gastrointestinal ROS: Completed and except as mentioned above were negative  Genito-Urinary ROS:  Completed and except as mentioned above were negative  Musculoskeletal ROS:  Completed and except as mentioned above were negative  Neurological ROS:  Completed and except as mentioned above were negative  Skin & Dermatological ROS:  Completed and except as mentioned above were negative  Psychological ROS:  Completed and except as mentioned above were negative  BRIEF HISTORY 56-year-old  female with underlying Evens Leach recently started on statin, DM with neuropathy who  Presented with diffuse myalgias, generailized weakness, tender to touch with little stimuli and chills for the past 2 days. Denies SOB, cough, diarrhea, consitpation, Urinary complaints, fever, night sweats, URI sx, change in voice, difficulty swallowing, leg swelling, headache or vision changes.  Denies any sick contacts or recent travel     Patient reports that she has had similar milder episodes in the past and was felt to be due to her chronic inflammatory thyroiditis.  For which her medications have been titrated.  Recently her 3 thyroid medications were adjusted by her primary care doctor and the patient reports that this is her current baseline. SHes denies confusion, difficulty concentrating.      She reports starting a statin medication recently, since then has noticed body aches. Doesn't take coQ10. She reports not drinking as much fluid as she should.      On admission, she was afebrile, hemodynamically stable on room air.  Leukocytosis 15 K, CPK 3100, myoglobin 69. Her potassium, renal function LFTs relatively ~normal. She reported significant improvement with fentanyl and admitted to fluid.     Her thyroid panel showed a normal T3, low T4, elevated TSH.  Chest x-ray unrevealing       OBJECTIVE     Vital Signs:  BP (!) 148/93   Pulse 76   Temp 98.1 °F (36.7 °C) (Oral)   Resp 18   SpO2 95%     Temp (24hrs), Av.1 °F (36.7 °C), Min:97.8 °F (36.6 °C), Max:98.6 °F (37 °C)    In: 1090   Out: 500 [Urine:500]    Physical Exam:  Constitutional: This is a well developed, well nourished, 30-34.9 - Obesity Grade I 35y.o. year old female who is alert, oriented, cooperative and in no apparent distress. Head:normocephalic and atraumatic. Respiratory:  Breath sounds bilaterally were clear to auscultation. There were no wheezes, rhonchi or rales.  There is no intercostal retraction or use of accessory muscles. Cardiovascular: Regular without murmur, clicks, gallops or rubs. Abdomen: Slightly rounded and soft. No rebound, rigidity or guarding was appreciated. Extremities:  Patient has tenderness over the extremities. Refused examination. Skin:  Warm and dry. Good color, turgor and pigmentation. No lesions or scars.   No cyanosis or clubbing  Neurological/Psychiatric: The patient's general behavior, level of consciousness, thought content and emotional status is normal.        Medications:  Scheduled Medications:    amoxicillin  500 mg Oral 3 times per day    thyroid  300 mg Oral Daily    Vitamin D  1,000 Units Oral Daily    pregabalin  100 mg Oral TID    sodium chloride flush  5-40 mL IntraVENous 2 times per day    enoxaparin  40 mg SubCUTAneous Daily    levothyroxine  100 mcg IntraVENous Daily    And    sodium chloride (PF)  5 mL IntraVENous Daily    [Held by provider] levothyroxine  200 mcg Oral Daily    [Held by provider] liothyronine  50 mcg Oral Daily    cyclobenzaprine  5 mg Oral TID     Continuous Infusions:    sodium chloride      sodium chloride 150 mL/hr at 04/29/22 2026     PRN Medicationssodium chloride flush, 10 mL, PRN  sodium chloride, , PRN  potassium chloride, 40 mEq, PRN   Or  potassium alternative oral replacement, 40 mEq, PRN   Or  potassium chloride, 10 mEq, PRN  magnesium sulfate, 1,000 mg, PRN  ondansetron, 4 mg, Q8H PRN   Or  ondansetron, 4 mg, Q6H PRN  polyethylene glycol, 17 g, Daily PRN  acetaminophen, 650 mg, Q6H PRN   Or  acetaminophen, 650 mg, Q6H PRN  morphine, 2 mg, Q4H PRN        Diagnostic Labs:  CBC:   Recent Labs     04/28/22 0938 04/29/22  0420 04/30/22  0638   WBC 8.9 9.5 10.9   RBC 4.28 3.88* 4.26   HGB 13.1 12.1 13.0   HCT 38.6 36.3 38.8   MCV 90.2 93.6 91.1   RDW 15.2* 15.3* 15.3*    256 243     BMP:   Recent Labs     04/27/22 2035 04/28/22  0938 04/29/22  0420    138 138   K 3.1* 3.5* 3.6*    104 105   CO2 22 25 23 BUN 8 7 9   CREATININE 0.99* 0.81 0.81     BNP: No results for input(s): BNP in the last 72 hours. PT/INR:   Recent Labs     04/29/22  0420   PROTIME 10.5   INR 1.0     APTT: No results for input(s): APTT in the last 72 hours. CARDIAC ENZYMES: No results for input(s): CKMB, CKMBINDEX, TROPONINI in the last 72 hours. Invalid input(s): CKTOTAL;3  FASTING LIPID PANEL:  Lab Results   Component Value Date    CHOL 278 (H) 03/01/2022    HDL 45 03/01/2022    TRIG 249 (H) 03/01/2022     LIVER PROFILE:   Recent Labs     04/27/22 2035 04/28/22  0938   AST 68* 79*   ALT 28 38*   BILITOT 0.31 0.44   ALKPHOS 78 71      MICROBIOLOGY:   Lab Results   Component Value Date/Time    CULTURE NO SIGNIFICANT GROWTH 04/28/2022 02:45 PM       Imaging:    XR CHEST PORTABLE    Result Date: 4/27/2022  1. No acute cardiopulmonary abnormality. ASSESSMENT & PLAN     Principal Problem:    Non-traumatic rhabdomyolysis  Active Problems:    Hashimoto's disease    Statin myopathy    Enterococcus UTI    Severe hypothyroidism    Obesity (BMI 30-39. 9)    Hypothyroid myopathy  Resolved Problems:    * No resolved hospital problems. *       Nontraumatic rhabdomyolysis:  Etiology multifactorial probably due to statin induced rhabdo. Statin held. Fenofibrate held. Patient received 1 L of 0.9% sodium chloride bolus in the ER on 4/27/2022. Continue IV hydration-0.9% sodium chloride at 150 ml per hour. total CK  1721. Patient on Pregabalin 100 mg thrice daily and flexeril 5 mg thrice daily. Chronic Hashimoto's thyroiditis:  free T3 3.83, TSH is 43.61, decreased from 65.75, free thyroxine 0.52- increased from 0.26. Patient on IV levothyroxine 100 mcg daily. Patient on armour tablet 300 mg daily. Urinary tract infection:  Urine culture positive for group D Enterococcus. Patient on amoxicillin 500 mg 3 times daily    Vitamin D deficiency:  Vitamin D of 25.3  Continue 1000 units daily on discharge.     Hypokalemia:  Potassium replaced.       DVT Ppx: patient refused lovenox, patient on EPC cuffs.    GI Ppx: Not Indicated  Diet: Regular adult diet  PT/OT: Consulted  Case Management: Consulted        Kasey Mcnamara MD  PGY-1, Internal Medicine Resident  [facility] 4/30/2022, 7:16 AM

## 2022-04-30 NOTE — PLAN OF CARE
Problem: Discharge Planning  Goal: Discharge to home or other facility with appropriate resources  Outcome: Completed     Problem: Pain  Goal: Verbalizes/displays adequate comfort level or baseline comfort level  Outcome: Completed     Problem: ABCDS Injury Assessment  Goal: Absence of physical injury  Outcome: Completed     Problem: Safety - Adult  Goal: Free from fall injury  Outcome: Completed   Electronically signed by Rosmery Hoffmann RN on 4/30/2022 at 12:01 PM

## 2022-05-01 NOTE — PROGRESS NOTES
CLINICAL PHARMACY NOTE: MEDS TO BEDS    Total # of Prescriptions Filled: 5   The following medications were delivered to the patient:  · famotidine  · Vitamin d  · Flexeril  · Amoxicillin  · Folic acid     Additional Documentation:

## 2022-05-03 LAB
CULTURE: NORMAL
GLIADIN DEAMINIDATED PEPTIDE AB IGA: 8.6 U/ML
GLIADIN DEAMINIDATED PEPTIDE AB IGG: <0.4 U/ML
IGA: 217 MG/DL (ref 70–400)
Lab: NORMAL
SPECIMEN DESCRIPTION: NORMAL
TISSUE TRANSGLUTAMINASE IGA: 1 U/ML

## 2022-05-03 NOTE — DISCHARGE SUMMARY
[facility]     Department of Internal Medicine - Staff Internal Medicine Teaching Service    INPATIENT DISCHARGE SUMMARY      Patient Identification:  Branden De León is a 35 y.o. female. :  1980  MRN: [id number]     Acct: [de-identified]   PCP: Rama Rueda MD  Admit Date:  2022  Discharge date and time: 2022  5:10 PM   Attending Provider: No att. providers found                                     [address] Problem Lists:  Principal Problem:    Non-traumatic rhabdomyolysis  Active Problems:    Hashimoto's disease    Statin myopathy    Enterococcus UTI    Severe hypothyroidism    Obesity (BMI 30-39. 9)    Hypothyroid myopathy  Resolved Problems:    * No resolved hospital problems. *      HOSPITAL STAY     Brief Inpatient course:   Branden De León is a 35 y.o. female who presented with  diffuse myalgias, generailized weakness, tender to touch with little stimuli and chills. She repored starting a statin medication recently, since then had noticed body aches. On admission, she was afebrile, hemodynamically stable on room air.  Leukocytosis 15 K, CPK 3100, myoglobin 69. Her potassium, renal function LFTs relatively ~normal. She reported significant improvement with fentanyl and admitted to fluid. Her thyroid panel showed a normal T3, low T4, elevated TSH.  Chest x-ray unrevealing   Urinalysis positive for UTI and patient was started on Rocephin which was later switched to Amoxicillin every 8 hours since Urine culture positive for Group D Enterococcus. Hospital course:    Nontraumatic rhabdomyolysis:  Etiology multifactorial probably due to statin induced rhabdo. Statin held. Fenofibrate held. Patient received 1 L of 0.9% sodium chloride bolus in the ER on 2022. Patient received Continuous IV hydration-0.9% sodium chloride at 150 ml per hour. total [id number] was trending down.   Patient on Pregabalin 100 mg thrice daily and flexeril 5 mg thrice daily.      Chronic Hashimoto's thyroiditis:  free T3 3.83, TSH is 43.61, decreased from 65.75, free thyroxine 0.52- increased from 0.26. Patient was on IV levothyroxine 100 mcg daily. Patient was on armour tablet 300 mg daily.      Urinary tract infection:  Urine culture positive for group D Enterococcus. Patient was on amoxicillin 500 mg 3 times daily     Vitamin D deficiency:  Vitamin D of 25.3  Patient was [id number] units daily on discharge.     Hypokalemia:  Potassium replaced.      Discharge diagnosis: Nontraumatic rhabdomyolysis:    Patient was medically cleared for discharge on 4/30/2022 after her CK and myoglobin were trending down. Patient was advised to follow up with GI and Endo. Procedures/ Significant Interventions:        Consults:     Consults:     Final Specialist Recommendations/Findings:   IP CONSULT TO HOSPITALIST  IP CONSULT TO ENDOCRINOLOGY      Any Hospital Acquired Infections: none    Discharge Functional Status:  stable    DISCHARGE PLAN     Disposition: home    Patient Instructions:   Discharge Medication List as of 4/30/2022  3:09 PM      START taking these medications    Details   amoxicillin (AMOXIL) 500 MG capsule Take 1 capsule by mouth every 8 hours for 4 days, Disp-12 capsule, R-0Normal      cyclobenzaprine (FLEXERIL) 5 MG tablet Take 1 tablet by mouth 3 times daily for 10 days, Disp-30 tablet, R-0Normal      Vitamin D (CHOLECALCIFEROL) 25 MCG (1000 UT) TABS tablet Take 1 tablet by mouth daily, Disp-29 tablet, R-3Labeling may look different. 25 mcg=1000 Units. Please double check dosages. Normal         CONTINUE these medications which have CHANGED    Details   folic acid (FOLVITE) 1 MG tablet Take 1 tablet by mouth daily, Disp-30 tablet, R-0Normal      levothyroxine (SYNTHROID) 200 MCG tablet Take 1 tablet by mouth Daily, Disp-30 tablet, R-3Normal      thyroid (ARMOUR THYROID) 300 MG tablet Take 1 tablet by mouth daily, Disp-30 tablet, R-3Normal liothyronine (CYTOMEL) 50 MCG tablet Take 1 tablet by mouth daily, Disp-30 tablet, R-3Normal      famotidine (PEPCID) 20 MG tablet Take 1 tablet by mouth 2 times daily for 14 days, Disp-28 tablet, R-0Normal         CONTINUE these medications which have NOT CHANGED    Details   pregabalin (LYRICA) 100 MG capsule Take 100 mg by mouth 3 times daily. Historical Med      ibuprofen (ADVIL;MOTRIN) 600 MG tablet Take 1 tablet by mouth every 8 hours as needed for Pain, Disp-21 tablet, R-0Print         STOP taking these medications       cholestyramine light 4 g packet Comments:   Reason for Stopping:               Activity: activity as tolerated and activity as tolerated and no driving for today    Diet: regular diet    Follow-up:    Salud Bernstein [facility] [address] [address]    Schedule an appointment as soon as possible for a visit in 3 days      Magda Estrada MD  [facility] [address] [place] [address]  [address] [address]  [phone]      for EGD and Colonoscopy    Ana Ann MD  [address] [facility] [address] [address]    In 1 week      Nighat Payne MD  [address] [place] [address]  [phone]    In 2 weeks        Patient Instructions:   Please take Amoxicillin 500 mg every 8 hours for 4 more days. Please take Vit D 1000 units 1 tablet daily. Please take your thyroid medications in the doses prescribed by your endocrinologist.     Please follow up with your PCP in 1 week    Please follow up with your endocrinologist    Please follow up with gastroenterologist for EGD, Colonoscopy    Please get labs for BMP in 3 days. Follow-up labs: bmp  Follow-up imaging: EGD, Colonoscopy      Note that over 30 minutes was spent in preparing discharge papers, discussing discharge with patient, medication review, etc.      Anthony Hall MD  Internal Medicine Resident, PGY-1  [facility];  [place], [place]  5/3/2022, 1:10 PM

## 2022-07-11 ENCOUNTER — HOSPITAL ENCOUNTER (INPATIENT)
Age: 42
LOS: 5 days | Discharge: HOME | DRG: 427 | End: 2022-07-17
Attending: EMERGENCY MEDICINE | Admitting: STUDENT IN AN ORGANIZED HEALTH CARE EDUCATION/TRAINING PROGRAM
Payer: COMMERCIAL

## 2022-07-11 DIAGNOSIS — E06.3 HYPOTHYROIDISM DUE TO HASHIMOTO'S THYROIDITIS: Primary | ICD-10-CM

## 2022-07-11 DIAGNOSIS — M62.82 NON-TRAUMATIC RHABDOMYOLYSIS: ICD-10-CM

## 2022-07-11 DIAGNOSIS — E03.8 HYPOTHYROIDISM DUE TO HASHIMOTO'S THYROIDITIS: Primary | ICD-10-CM

## 2022-07-11 LAB
-: ABNORMAL
BACTERIA: ABNORMAL
BILIRUBIN URINE: NEGATIVE
COLOR: YELLOW
EPITHELIAL CELLS UA: ABNORMAL /HPF (ref 0–5)
GLUCOSE URINE: NEGATIVE
HCG QUALITATIVE: NEGATIVE
KETONES, URINE: NEGATIVE
LEUKOCYTE ESTERASE, URINE: ABNORMAL
MAGNESIUM: 1.8 MG/DL (ref 1.6–2.6)
MYOGLOBIN: 86 NG/ML (ref 25–58)
NITRITE, URINE: NEGATIVE
PH UA: 7 (ref 5–8)
PROTEIN UA: ABNORMAL
RBC UA: ABNORMAL /HPF (ref 0–2)
SPECIFIC GRAVITY UA: 1.01 (ref 1–1.03)
THYROXINE, FREE: 0.12 NG/DL (ref 0.93–1.7)
TOTAL CK: 612 U/L (ref 26–192)
TURBIDITY: ABNORMAL
URINE HGB: NEGATIVE
UROBILINOGEN, URINE: NORMAL
WBC UA: ABNORMAL /HPF (ref 0–5)

## 2022-07-11 PROCEDURE — 6370000000 HC RX 637 (ALT 250 FOR IP)

## 2022-07-11 PROCEDURE — 85025 COMPLETE CBC W/AUTO DIFF WBC: CPT

## 2022-07-11 PROCEDURE — 82550 ASSAY OF CK (CPK): CPT

## 2022-07-11 PROCEDURE — 2580000003 HC RX 258

## 2022-07-11 PROCEDURE — 83874 ASSAY OF MYOGLOBIN: CPT

## 2022-07-11 PROCEDURE — 96365 THER/PROPH/DIAG IV INF INIT: CPT

## 2022-07-11 PROCEDURE — 83735 ASSAY OF MAGNESIUM: CPT

## 2022-07-11 PROCEDURE — 84443 ASSAY THYROID STIM HORMONE: CPT

## 2022-07-11 PROCEDURE — 6360000002 HC RX W HCPCS

## 2022-07-11 PROCEDURE — 99285 EMERGENCY DEPT VISIT HI MDM: CPT

## 2022-07-11 PROCEDURE — 96375 TX/PRO/DX INJ NEW DRUG ADDON: CPT

## 2022-07-11 PROCEDURE — 84439 ASSAY OF FREE THYROXINE: CPT

## 2022-07-11 PROCEDURE — 80053 COMPREHEN METABOLIC PANEL: CPT

## 2022-07-11 PROCEDURE — 81001 URINALYSIS AUTO W/SCOPE: CPT

## 2022-07-11 PROCEDURE — 84703 CHORIONIC GONADOTROPIN ASSAY: CPT

## 2022-07-11 RX ORDER — MORPHINE SULFATE 4 MG/ML
4 INJECTION, SOLUTION INTRAMUSCULAR; INTRAVENOUS ONCE
Status: COMPLETED | OUTPATIENT
Start: 2022-07-11 | End: 2022-07-11

## 2022-07-11 RX ORDER — 0.9 % SODIUM CHLORIDE 0.9 %
1000 INTRAVENOUS SOLUTION INTRAVENOUS ONCE
Status: COMPLETED | OUTPATIENT
Start: 2022-07-11 | End: 2022-07-11

## 2022-07-11 RX ORDER — POTASSIUM CHLORIDE 7.45 MG/ML
10 INJECTION INTRAVENOUS ONCE
Status: COMPLETED | OUTPATIENT
Start: 2022-07-11 | End: 2022-07-12

## 2022-07-11 RX ADMIN — CEFTRIAXONE SODIUM 1000 MG: 1 INJECTION, POWDER, FOR SOLUTION INTRAMUSCULAR; INTRAVENOUS at 23:45

## 2022-07-11 RX ADMIN — POTASSIUM BICARBONATE 40 MEQ: 782 TABLET, EFFERVESCENT ORAL at 23:45

## 2022-07-11 RX ADMIN — MORPHINE SULFATE 4 MG: 4 INJECTION INTRAVENOUS at 22:23

## 2022-07-11 RX ADMIN — SODIUM CHLORIDE 1000 ML: 9 INJECTION, SOLUTION INTRAVENOUS at 22:22

## 2022-07-11 ASSESSMENT — ENCOUNTER SYMPTOMS
DIARRHEA: 0
VOMITING: 0
SHORTNESS OF BREATH: 0
NAUSEA: 0
ABDOMINAL PAIN: 0

## 2022-07-11 ASSESSMENT — PAIN SCALES - GENERAL: PAINLEVEL_OUTOF10: 10

## 2022-07-11 NOTE — Clinical Note
Discharge Plan[de-identified] Home with Office Follow-up   Telemetry/Cardiac Monitoring Required?: Yes   Bed request comments: med surg tele

## 2022-07-12 PROBLEM — E03.9 MYXEDEMA: Status: ACTIVE | Noted: 2022-07-12

## 2022-07-12 PROBLEM — I10 PRIMARY HYPERTENSION: Status: ACTIVE | Noted: 2022-07-12

## 2022-07-12 PROBLEM — G73.7 THYROID MYOPATHY: Status: ACTIVE | Noted: 2022-07-12

## 2022-07-12 PROBLEM — E07.9 THYROID MYOPATHY: Status: ACTIVE | Noted: 2022-07-12

## 2022-07-12 LAB
ABSOLUTE EOS #: 0.32 K/UL (ref 0–0.4)
ABSOLUTE IMMATURE GRANULOCYTE: 0 K/UL (ref 0–0.3)
ABSOLUTE LYMPH #: 4.93 K/UL (ref 1–4.8)
ABSOLUTE MONO #: 0.64 K/UL (ref 0.1–0.8)
ALBUMIN SERPL-MCNC: 4.4 G/DL (ref 3.5–5.2)
ALBUMIN/GLOBULIN RATIO: 1.6 (ref 1–2.5)
ALP BLD-CCNC: 80 U/L (ref 35–104)
ALT SERPL-CCNC: 26 U/L (ref 5–33)
ANION GAP SERPL CALCULATED.3IONS-SCNC: 17 MMOL/L (ref 9–17)
AST SERPL-CCNC: 31 U/L
ATYPICAL LYMPHOCYTE ABSOLUTE COUNT: 0.95 K/UL
ATYPICAL LYMPHOCYTES: 6 %
BASOPHILS # BLD: 1 % (ref 0–2)
BASOPHILS ABSOLUTE: 0.16 K/UL (ref 0–0.2)
BILIRUB SERPL-MCNC: 0.19 MG/DL (ref 0.3–1.2)
BUN BLDV-MCNC: 9 MG/DL (ref 6–20)
CALCIUM SERPL-MCNC: 10.2 MG/DL (ref 8.6–10.4)
CHLORIDE BLD-SCNC: 94 MMOL/L (ref 98–107)
CO2: 23 MMOL/L (ref 20–31)
CORTISOL: 6 UG/DL (ref 2.7–18.4)
CORTISOL: 6.8 UG/DL (ref 2.7–18.4)
CREAT SERPL-MCNC: 1 MG/DL (ref 0.5–0.9)
EOSINOPHILS RELATIVE PERCENT: 2 % (ref 1–4)
GFR AFRICAN AMERICAN: >60 ML/MIN
GFR NON-AFRICAN AMERICAN: >60 ML/MIN
GFR SERPL CREATININE-BSD FRML MDRD: ABNORMAL ML/MIN/{1.73_M2}
GLUCOSE BLD-MCNC: 159 MG/DL (ref 70–99)
HCT VFR BLD CALC: 38.5 % (ref 36.3–47.1)
HEMOGLOBIN: 13.5 G/DL (ref 11.9–15.1)
IMMATURE GRANULOCYTES: 0 %
LYMPHOCYTES # BLD: 31 % (ref 24–44)
MCH RBC QN AUTO: 31.1 PG (ref 25.2–33.5)
MCHC RBC AUTO-ENTMCNC: 35.1 G/DL (ref 28.4–34.8)
MCV RBC AUTO: 88.7 FL (ref 82.6–102.9)
MONOCYTES # BLD: 4 % (ref 1–7)
MORPHOLOGY: NORMAL
NRBC AUTOMATED: 0 PER 100 WBC
PDW BLD-RTO: 14.1 % (ref 11.8–14.4)
PLATELET # BLD: 245 K/UL (ref 138–453)
PMV BLD AUTO: 12.9 FL (ref 8.1–13.5)
POTASSIUM SERPL-SCNC: 3.1 MMOL/L (ref 3.7–5.3)
RBC # BLD: 4.34 M/UL (ref 3.95–5.11)
SEG NEUTROPHILS: 56 % (ref 36–66)
SEGMENTED NEUTROPHILS ABSOLUTE COUNT: 8.9 K/UL (ref 1.8–7.7)
SODIUM BLD-SCNC: 134 MMOL/L (ref 135–144)
T3 FREE: 0.49 PG/ML (ref 2.02–4.43)
THYROXINE, FREE: 0.12 NG/DL (ref 0.93–1.7)
TOTAL CK: 472 U/L (ref 26–192)
TOTAL PROTEIN: 7.2 G/DL (ref 6.4–8.3)
TSH SERPL DL<=0.05 MIU/L-ACNC: 255.5 UIU/ML (ref 0.3–5)
TSH SERPL DL<=0.05 MIU/L-ACNC: 302.8 UIU/ML (ref 0.3–5)
WBC # BLD: 15.9 K/UL (ref 3.5–11.3)

## 2022-07-12 PROCEDURE — 84443 ASSAY THYROID STIM HORMONE: CPT

## 2022-07-12 PROCEDURE — 2060000000 HC ICU INTERMEDIATE R&B

## 2022-07-12 PROCEDURE — 82550 ASSAY OF CK (CPK): CPT

## 2022-07-12 PROCEDURE — 99223 1ST HOSP IP/OBS HIGH 75: CPT | Performed by: PHYSICIAN ASSISTANT

## 2022-07-12 PROCEDURE — 2580000003 HC RX 258: Performed by: PHYSICIAN ASSISTANT

## 2022-07-12 PROCEDURE — 84439 ASSAY OF FREE THYROXINE: CPT

## 2022-07-12 PROCEDURE — 2500000003 HC RX 250 WO HCPCS: Performed by: PHYSICIAN ASSISTANT

## 2022-07-12 PROCEDURE — 6360000002 HC RX W HCPCS

## 2022-07-12 PROCEDURE — 82533 TOTAL CORTISOL: CPT

## 2022-07-12 PROCEDURE — 96375 TX/PRO/DX INJ NEW DRUG ADDON: CPT

## 2022-07-12 PROCEDURE — A4216 STERILE WATER/SALINE, 10 ML: HCPCS | Performed by: PHYSICIAN ASSISTANT

## 2022-07-12 PROCEDURE — 6360000002 HC RX W HCPCS: Performed by: PHYSICIAN ASSISTANT

## 2022-07-12 PROCEDURE — 6370000000 HC RX 637 (ALT 250 FOR IP): Performed by: NURSE PRACTITIONER

## 2022-07-12 PROCEDURE — 6370000000 HC RX 637 (ALT 250 FOR IP): Performed by: PHYSICIAN ASSISTANT

## 2022-07-12 PROCEDURE — 2580000003 HC RX 258: Performed by: NURSE PRACTITIONER

## 2022-07-12 PROCEDURE — 6360000002 HC RX W HCPCS: Performed by: NURSE PRACTITIONER

## 2022-07-12 PROCEDURE — 84481 FREE ASSAY (FT-3): CPT

## 2022-07-12 PROCEDURE — 6370000000 HC RX 637 (ALT 250 FOR IP)

## 2022-07-12 PROCEDURE — 36415 COLL VENOUS BLD VENIPUNCTURE: CPT

## 2022-07-12 RX ORDER — POTASSIUM CHLORIDE 20 MEQ/1
40 TABLET, EXTENDED RELEASE ORAL PRN
Status: DISCONTINUED | OUTPATIENT
Start: 2022-07-12 | End: 2022-07-17 | Stop reason: HOSPADM

## 2022-07-12 RX ORDER — FAMOTIDINE 20 MG/1
20 TABLET, FILM COATED ORAL 2 TIMES DAILY
Status: DISCONTINUED | OUTPATIENT
Start: 2022-07-12 | End: 2022-07-17 | Stop reason: HOSPADM

## 2022-07-12 RX ORDER — VITAMIN B COMPLEX
1000 TABLET ORAL DAILY
Status: DISCONTINUED | OUTPATIENT
Start: 2022-07-12 | End: 2022-07-17 | Stop reason: HOSPADM

## 2022-07-12 RX ORDER — LEVOTHYROXINE SODIUM ANHYDROUS 100 UG/5ML
100 INJECTION, POWDER, LYOPHILIZED, FOR SOLUTION INTRAVENOUS DAILY
Status: DISCONTINUED | OUTPATIENT
Start: 2022-07-12 | End: 2022-07-14

## 2022-07-12 RX ORDER — FOLIC ACID 1 MG/1
1 TABLET ORAL DAILY
Status: DISCONTINUED | OUTPATIENT
Start: 2022-07-12 | End: 2022-07-17 | Stop reason: HOSPADM

## 2022-07-12 RX ORDER — POLYETHYLENE GLYCOL 3350 17 G/17G
17 POWDER, FOR SOLUTION ORAL DAILY PRN
Status: DISCONTINUED | OUTPATIENT
Start: 2022-07-12 | End: 2022-07-17 | Stop reason: HOSPADM

## 2022-07-12 RX ORDER — SODIUM CHLORIDE 9 MG/ML
5 INJECTION INTRAVENOUS DAILY
Status: DISCONTINUED | OUTPATIENT
Start: 2022-07-12 | End: 2022-07-14

## 2022-07-12 RX ORDER — PREGABALIN 100 MG/1
100 CAPSULE ORAL 3 TIMES DAILY
Status: DISCONTINUED | OUTPATIENT
Start: 2022-07-12 | End: 2022-07-17 | Stop reason: HOSPADM

## 2022-07-12 RX ORDER — ONDANSETRON 4 MG/1
4 TABLET, ORALLY DISINTEGRATING ORAL EVERY 8 HOURS PRN
Status: DISCONTINUED | OUTPATIENT
Start: 2022-07-12 | End: 2022-07-17 | Stop reason: HOSPADM

## 2022-07-12 RX ORDER — SODIUM CHLORIDE AND POTASSIUM CHLORIDE .9; .15 G/100ML; G/100ML
SOLUTION INTRAVENOUS CONTINUOUS
Status: DISCONTINUED | OUTPATIENT
Start: 2022-07-12 | End: 2022-07-17 | Stop reason: HOSPADM

## 2022-07-12 RX ORDER — ENOXAPARIN SODIUM 100 MG/ML
40 INJECTION SUBCUTANEOUS DAILY
Status: DISCONTINUED | OUTPATIENT
Start: 2022-07-12 | End: 2022-07-17 | Stop reason: HOSPADM

## 2022-07-12 RX ORDER — ACETAMINOPHEN 325 MG/1
650 TABLET ORAL EVERY 6 HOURS PRN
Status: DISCONTINUED | OUTPATIENT
Start: 2022-07-12 | End: 2022-07-17 | Stop reason: HOSPADM

## 2022-07-12 RX ORDER — SODIUM CHLORIDE 9 MG/ML
INJECTION, SOLUTION INTRAVENOUS CONTINUOUS
Status: DISCONTINUED | OUTPATIENT
Start: 2022-07-12 | End: 2022-07-17 | Stop reason: HOSPADM

## 2022-07-12 RX ORDER — SODIUM CHLORIDE 0.9 % (FLUSH) 0.9 %
5-40 SYRINGE (ML) INJECTION PRN
Status: DISCONTINUED | OUTPATIENT
Start: 2022-07-12 | End: 2022-07-17 | Stop reason: HOSPADM

## 2022-07-12 RX ORDER — MORPHINE SULFATE 2 MG/ML
2 INJECTION, SOLUTION INTRAMUSCULAR; INTRAVENOUS EVERY 4 HOURS PRN
Status: DISCONTINUED | OUTPATIENT
Start: 2022-07-12 | End: 2022-07-17 | Stop reason: HOSPADM

## 2022-07-12 RX ORDER — ONDANSETRON 2 MG/ML
4 INJECTION INTRAMUSCULAR; INTRAVENOUS EVERY 6 HOURS PRN
Status: DISCONTINUED | OUTPATIENT
Start: 2022-07-12 | End: 2022-07-17 | Stop reason: HOSPADM

## 2022-07-12 RX ORDER — LEVOTHYROXINE SODIUM 0.1 MG/1
200 TABLET ORAL DAILY
Status: DISCONTINUED | OUTPATIENT
Start: 2022-07-12 | End: 2022-07-17 | Stop reason: HOSPADM

## 2022-07-12 RX ORDER — AMLODIPINE BESYLATE 5 MG/1
5 TABLET ORAL DAILY
Status: DISCONTINUED | OUTPATIENT
Start: 2022-07-12 | End: 2022-07-13

## 2022-07-12 RX ORDER — ACETAMINOPHEN 650 MG/1
650 SUPPOSITORY RECTAL EVERY 6 HOURS PRN
Status: DISCONTINUED | OUTPATIENT
Start: 2022-07-12 | End: 2022-07-17 | Stop reason: HOSPADM

## 2022-07-12 RX ORDER — POTASSIUM CHLORIDE 7.45 MG/ML
10 INJECTION INTRAVENOUS PRN
Status: DISCONTINUED | OUTPATIENT
Start: 2022-07-12 | End: 2022-07-17 | Stop reason: HOSPADM

## 2022-07-12 RX ORDER — SODIUM CHLORIDE 0.9 % (FLUSH) 0.9 %
5-40 SYRINGE (ML) INJECTION EVERY 12 HOURS SCHEDULED
Status: DISCONTINUED | OUTPATIENT
Start: 2022-07-12 | End: 2022-07-17 | Stop reason: HOSPADM

## 2022-07-12 RX ORDER — LIOTHYRONINE SODIUM 25 UG/1
50 TABLET ORAL DAILY
Status: DISCONTINUED | OUTPATIENT
Start: 2022-07-12 | End: 2022-07-14

## 2022-07-12 RX ADMIN — FOLIC ACID 1 MG: 1 TABLET ORAL at 12:47

## 2022-07-12 RX ADMIN — LIOTHYRONINE SODIUM 50 MCG: 25 TABLET ORAL at 16:07

## 2022-07-12 RX ADMIN — PREGABALIN 100 MG: 100 CAPSULE ORAL at 19:35

## 2022-07-12 RX ADMIN — SODIUM CHLORIDE 5 ML: 9 INJECTION INTRAMUSCULAR; INTRAVENOUS; SUBCUTANEOUS at 17:27

## 2022-07-12 RX ADMIN — LEVOTHYROXINE SODIUM 200 MCG: 100 TABLET ORAL at 14:19

## 2022-07-12 RX ADMIN — THYROID, PORCINE 300 MG: 180 TABLET ORAL at 16:06

## 2022-07-12 RX ADMIN — FAMOTIDINE 20 MG: 20 TABLET, FILM COATED ORAL at 19:29

## 2022-07-12 RX ADMIN — Medication 1000 UNITS: at 12:47

## 2022-07-12 RX ADMIN — POTASSIUM CHLORIDE AND SODIUM CHLORIDE: 900; 150 INJECTION, SOLUTION INTRAVENOUS at 11:31

## 2022-07-12 RX ADMIN — ACETAMINOPHEN 650 MG: 325 TABLET ORAL at 19:29

## 2022-07-12 RX ADMIN — MORPHINE SULFATE 2 MG: 2 INJECTION, SOLUTION INTRAMUSCULAR; INTRAVENOUS at 14:24

## 2022-07-12 RX ADMIN — FAMOTIDINE 20 MG: 20 TABLET, FILM COATED ORAL at 12:48

## 2022-07-12 RX ADMIN — MORPHINE SULFATE 2 MG: 2 INJECTION, SOLUTION INTRAMUSCULAR; INTRAVENOUS at 19:29

## 2022-07-12 RX ADMIN — POTASSIUM CHLORIDE 10 MEQ: 7.46 INJECTION, SOLUTION INTRAVENOUS at 00:17

## 2022-07-12 RX ADMIN — AMLODIPINE BESYLATE 5 MG: 5 TABLET ORAL at 10:40

## 2022-07-12 RX ADMIN — POTASSIUM BICARBONATE 40 MEQ: 782 TABLET, EFFERVESCENT ORAL at 10:40

## 2022-07-12 RX ADMIN — PREGABALIN 100 MG: 100 CAPSULE ORAL at 14:19

## 2022-07-12 RX ADMIN — LEVOTHYROXINE SODIUM ANHYDROUS 100 MCG: 100 INJECTION, POWDER, LYOPHILIZED, FOR SOLUTION INTRAVENOUS at 17:26

## 2022-07-12 RX ADMIN — ONDANSETRON 4 MG: 4 TABLET, ORALLY DISINTEGRATING ORAL at 19:29

## 2022-07-12 RX ADMIN — SODIUM CHLORIDE, PRESERVATIVE FREE 10 ML: 5 INJECTION INTRAVENOUS at 11:35

## 2022-07-12 ASSESSMENT — PAIN DESCRIPTION - LOCATION: LOCATION: FOOT

## 2022-07-12 ASSESSMENT — PAIN DESCRIPTION - DESCRIPTORS: DESCRIPTORS: ACHING

## 2022-07-12 ASSESSMENT — PAIN SCALES - GENERAL
PAINLEVEL_OUTOF10: 8
PAINLEVEL_OUTOF10: 8

## 2022-07-12 ASSESSMENT — PAIN DESCRIPTION - ORIENTATION: ORIENTATION: RIGHT;LEFT

## 2022-07-12 NOTE — ED PROVIDER NOTES
FACULTY SIGN-OUT  ADDENDUM     Care of this patient was assumed from previous attending physician. The patient's initial evaluation and plan have been discussed with the prior provider who initially evaluated the patient. Attestation  I was available and discussed any additional care issues that arose and coordinated the management plans with the resident(s) caring for the patient during my duty period. Any areas of disagreement with resident's documentation of care or procedures are noted on the chart. I was personally present for the key portions of any/all procedures, during my duty period. I have documented in the chart those procedures where I was not present during the key portions. ED COURSE      The patient was given the following medications:  Orders Placed This Encounter   Medications    morphine injection 4 mg    0.9 % sodium chloride bolus    cefTRIAXone (ROCEPHIN) 1000 mg IVPB in NS 50ml minibag     Order Specific Question:   Antimicrobial Indications     Answer:   Urinary Tract Infection    potassium chloride 10 mEq/100 mL IVPB (Peripheral Line)    potassium bicarb-citric acid (EFFER-K) effervescent tablet 40 mEq       RECENT VITALS:   Temp: 98.4 °F (36.9 °C), Heart Rate: 84, Resp: 16, BP: (!) 161/98    MEDICAL DECISION MAKING        Cherrie Blankenship is a 35 y.o. female who presents to the Emergency Department with complaints of muscle aches. UTI, HypoK, mild rhabdo. Plan admit.       Annemarie Cabrera MD  Attending Emergency Physician    (Please note that portions of this note were completed with a voice recognition program.  Efforts were made to edit the dictations but occasionally words are mis-transcribed.)          Annemarie Cabrera MD  07/11/22 7901

## 2022-07-12 NOTE — ED NOTES
Report taken from staff RN. Pt respirations are even and unlabored, pt is oriented X 4, speaking in complete sentences, bed is in the lowest position, call light is within reach. Will continue to monitor.   Water given to pt per request.     Steffanie Plummer RN  07/12/22 2443

## 2022-07-12 NOTE — ED NOTES
Pt updated on POC. Pt resting on cot with call light in reach. NAD.       Lonnie Su RN  07/12/22 8720

## 2022-07-12 NOTE — FLOWSHEET NOTE
Pt admitted to 4B  at 0849. Vitals taken, pt orientated to room. See flowsheet for further documentation. Pt requested the form to go outside and smoke. RN educated the pt on smoking cessation. RN informed the medical team of the pt's request to leave the unit to smoke. Pt is independent in the room able to ambulate to go outside. Call light within reach. Will continue to monitor pt.     Milagros GÓMEZ

## 2022-07-12 NOTE — ED NOTES
Pt provided with meal tray and updated on POC with intent to admit upstairs.  Pt good with plan and no further needs      Lonnie Su RN  07/12/22 0434

## 2022-07-12 NOTE — H&P
[facility]  Office: [address], DO, Everett Bills, DO, Sameera Esthela, DO, Stephy Townsend Blood, DO, Peace Chau MD, Yaneth Sanchez MD, Ken Troncoso MD, Colleen Everett MD, Saurabh Benjamin MD, Delores Duckworth MD, Kashmir Nova MD, Valeri Lozoya, DO, Chloé Kinsey MD,  Noel Phillip, DO, Valentin Horner MD, Rafael Contreras MD, Mariam Altamirano DO, Santo Wilson MD, Dione Guillen MD, Violet Chow DO, Loree Singh MD, Karen Amaya MD, Moris Cannon [facility], [facility], CNP, Terri Rosas, CNP, Randee Feliciano, CNP, Adria Palu, CNP, France Ontiveros, CNP, Deepti Lewis PA-C, Yue Fernández, DNP, Michael Gaona, CNP, Wolfgang Pollock, CNP, Bobbi Finley, CNP, John Melton, CNS, Christopher Cruz, [facility], Alicia Resendiz, CNP, Alexsandra Aceves, CNP, Kasey Anglin, CNP         [address]    HISTORY AND PHYSICAL EXAMINATION            Date:   7/12/2022  Patient name:  Lalo An  Date of admission:  7/11/2022  9:37 PM  MRN:   [id number]  Account:  [de-identified]  YOB: 1980  PCP:    Bethany Ron MD  Room:   0496/2844-32  Code Status:    Full Code    Chief Complaint:     Chief Complaint   Patient presents with    Other     possible thyroid storm. Last TSh 191.  Muscle Pain       History Obtained From:     patient, electronic medical record    History of Present Illness:     Lalo An is a 35 y.o. female with past medical history detailed below. Patient presented to the emergency department complaining of fatigue, muscle weakness/pain. Patient has a longstanding history of hypothyroidism that she has struggled with. She had a total thyroidectomy in 2019 for multinodular goiter. She is intermittently hospitalized with symptoms consistent with profound hypothyroidism. Typically her symptoms consist of fatigue and muscle weakness/pain.   She presented to the emergency department complaining of (36.8 °C) (Temporal)   Resp 17   SpO2 95%   Temp (24hrs), Av.8 °F (36.6 °C), Min:97 °F (36.1 °C), Max:98.4 °F (36.9 °C)    No results for input(s): POCGLU in the last 72 hours.     Intake/Output Summary (Last 24 hours) at 2022 1356  Last data filed at 2022 1300  Gross per 24 hour   Intake 540 ml   Output --   Net 540 ml       General Appearance: alert, well appearing, and in no acute distress  Mental status: oriented to person, place, and time  Head: normocephalic, atraumatic  Eye: no icterus, redness, pupils equal and reactive, extraocular eye movements intact, conjunctiva clear  Ear: normal external ear, no discharge, hearing intact  Nose: no drainage noted  Mouth: mucous membranes moist  Neck: supple, no carotid bruits, thyroid not palpable  Lungs: Bilateral equal air entry, clear to ausculation, no wheezing, rales or rhonchi, normal effort  Cardiovascular: normal rate, regular rhythm, no murmur, gallop, rub  Abdomen: Soft, nontender, nondistended, normal bowel sounds, no hepatomegaly or splenomegaly  Neurologic: There are no new focal motor or sensory deficits, normal muscle tone and bulk, no abnormal sensation, normal speech, cranial nerves II through XII grossly intact  Skin: No gross lesions, rashes, bruising or bleeding on exposed skin area  Extremities: peripheral pulses palpable. + lower extremity edema, taut skin  Psych: normal affect    Investigations:      Laboratory Testing:  Recent Results (from the past 24 hour(s))   CBC with Auto Differential    Collection Time: 22 10:14 PM   Result Value Ref Range    WBC 15.9 (H) 3.5 - 11.3 k/uL    RBC 4.34 3.95 - 5.11 m/uL    Hemoglobin 13.5 11.9 - 15.1 g/dL    Hematocrit 38.5 36.3 - 47.1 %    MCV 88.7 82.6 - 102.9 fL    MCH 31.1 25.2 - 33.5 pg    MCHC 35.1 (H) 28.4 - 34.8 g/dL    RDW 14.1 11.8 - 14.4 %    Platelets 959 558 - 313 k/uL    MPV 12.9 8.1 - 13.5 fL    NRBC Automated 0.0 0.0 per 100 WBC    Immature Granulocytes 0 0 %    Seg Result Value Ref Range    Color, UA Yellow Yellow    Turbidity UA Cloudy (A) Clear    Glucose, Ur NEGATIVE NEGATIVE    Bilirubin Urine NEGATIVE NEGATIVE    Ketones, Urine NEGATIVE NEGATIVE    Specific Gravity, UA 1.011 1.005 - 1.030    Urine Hgb NEGATIVE NEGATIVE    pH, UA 7.0 5.0 - 8.0    Protein, UA 2+ (A) NEGATIVE    Urobilinogen, Urine Normal Normal    Nitrite, Urine NEGATIVE NEGATIVE    Leukocyte Esterase, Urine TRACE (A) NEGATIVE    -          WBC, UA 5 TO 10 0 - 5 /HPF    RBC, UA 0 TO 2 0 - 2 /HPF    Epithelial Cells UA 10 TO 20 0 - 5 /HPF    Bacteria, UA MODERATE (A) None   CK    Collection Time: 07/12/22 12:23 PM   Result Value Ref Range    Total  (H) 26 - 192 U/L       Imaging/Diagnostics:  No results found. Assessment :      Hospital Problems           Last Modified POA    * (Principal) Thyroid myopathy 7/12/2022 Yes    Myxedema 7/12/2022 Yes    Primary hypertension 7/12/2022 Yes    Severe hypothyroidism 7/12/2022 Yes    Obesity (BMI 30-39. 9) 7/12/2022 Yes    H/O total thyroidectomy 7/12/2022 Yes    Non-traumatic rhabdomyolysis 7/12/2022 Yes          Plan:     Patient status inpatient in the Med/Surge    #Severe hypothyroidism/rhabdo  - IV synthroid 100 mcg daily. Continue oral regimen as ordered. Check cortisol. Transfer to [address] [place] [address], already accepted. #Hypertension  - start amlodipine 5 mg daily, will titrate as appropriate    #Hypokalemia  - will likely need IV potassium as her absorption capacity is likely poor given the status of her thyroid. Will continue to monitor    Lovenox DVT prophylaxis  PT/OT    Consultations:   IP CONSULT TO HOSPITALIST    Patient is admitted as inpatient status because of co-morbidities listed above, severity of signs and symptoms as outlined, requirement for current medical therapies and most importantly because of direct risk to patient if care not provided in a hospital setting. Expected length of stay > 48 hours.     Feli Nix CHERELLE  7/12/2022  1:56 PM    Copy sent to Dr. Jered Roberson MD

## 2022-07-12 NOTE — PLAN OF CARE
Attending Supervising Physician's Attestation Statement  I performed a history and physical examination on the patient and discussed the management with the physician assistant. I reviewed and agree with the findings and plan as documented in his note . Briefly this is a 24-year-old female past medical history of sever hypothyrodisim, Status post thyroidectomy, obesity, rhabdomyolisis due to myxedema presetns with fatigue has worked with her endocrinologist as outpatient. She tellsme that she has been working with her endocrinologist as an outpatient has failed to absorb cytomel, synthroid and armour. Currently on armour 300 mg daily. Patient states she is trying to see if she can get a port for daily synthroid injections as no insurance will cover compounding synthroid. Discussed with patient my concern of needing endocrinology inpatient for further evaluation. Plan is to transfer to [facility] for inpatient evaluation of endocrinology as pateint has had 5 admissions for the same issues this year.     Electronically signed by Ofe Olsen MD on 7/12/22 at 8:08 AM EDT

## 2022-07-12 NOTE — ED NOTES
Pt being transported to room 426 now with wheelchair. Pt on RA and no fluids running.      Tamia Eden RN  07/12/22 7401

## 2022-07-12 NOTE — ED NOTES
Pt to ED via private auto with c/o sofía beard lower ex swelling as well as pain. Pt states she had her thyroid removed several years ago and has been having increasingly worsening issues with absorption and thyroid levels. Pt states her dr suggested she come in to ED for admit to get synthroid infusion and possibly have a PICC inserted.  Pt in NAD and A/O x [address], RN  07/12/22 4785

## 2022-07-12 NOTE — CARE COORDINATION
07/12/22 1737   Service Assessment   Patient Orientation Alert and Oriented   Cognition Alert   History Provided By Patient   Primary Caregiver Self   Support Systems Spouse/Significant Other;Children;Family Members;Parent   PCP Verified by CM Yes   Last Visit to PCP Within last two years   Prior Functional Level Independent in ADLs/IADLs   Current Functional Level Independent in ADLs/IADLs   Can patient return to prior living arrangement Yes   Ability to make needs known: Good   Family able to assist with home care needs: Yes   Would you like for me to discuss the discharge plan with any other family members/significant others, and if so, who? No   Financial Resources Medicaid; [employer]   Social/Functional History   Lives With Spouse;Daughter;Son  (4 sons live at home)   Type of [address] [place] to enter without rails   Entrance Stairs - Number of Steps 1   Bathroom Shower/Tub Tub/Shower unit   Bathroom Toilet Standard   Bathroom Accessibility Accessible   Receives Help From Family;Friend(s)   ADL Assistance Independent   Homemaking Assistance Independent   Homemaking Responsibilities Yes   Ambulation Assistance Independent   Transfer Assistance Independent   Active  Yes   Mode of Transportation Car   Occupation On disability   Discharge Planning   Type of Johnny Spouse/Significant Other;Children   Current Services Prior To Admission None   Potential Assistance Needed Transportation  (to [facility])   DME Ordered? No   Potential Assistance Purchasing Medications No   Meds-to-Beds: Does the patient want to have any new prescriptions delivered to bedside prior to discharge? Yes   Patient expects to be discharged to: Other (comment)  [facility] clinic)   One/Two Story Residence One story   History of falls? [address] Discharge None   [employer] Information Provided?  No   Planning on

## 2022-07-12 NOTE — ED PROVIDER NOTES
[facility]  ED  Emergency Department Encounter  EmergencyMedicine Resident     Pt Name:Cherrie Smith  MRN: [id number]  [place] 1980  Date of evaluation: 7/11/22  PCP:  Leann Alegre MD      CHIEF COMPLAINT       Chief Complaint   Patient presents with    Other     possible thyroid storm. Last TSh 191.  Muscle Pain       HISTORY OF PRESENT ILLNESS  (Location/Symptom, Timing/Onset, Context/Setting, Quality, Duration, Modifying Factors, Severity.)      Td Ellison is a 35 y.o. female with a past medical history of chronic Hashimoto's thyroiditis, s/p surgical thyroidectomy in 2019, and hypothyroid myopathy who presents with diffuse muscle pain. Patient states that she has been having muscle pain for months but today the pain was unbearable. She called her endocrinologist's office this afternoon (Dr. Jacquelyn Banuelos) and they recommended she come to the emergency department to receive IV synthroid as \"her body does not absorb synthroid\". Patient states she is scheduled to meet with Dr. Jacquelyn Banuelos on July 18th to discuss placing a port that will assist in delivering her thyroid medications. Patient is currently on 200 mg of levothyroxine, 300 mg of [place] thyroid as well as 50 mg of liothyronine. Patient normally takes Lyrica TID and Flexeril for her pain but that has not helped. Patient denies any chest pain, SOB. abdominal pain, diarrhea, fever, or diaphoresis. Patient was admitted for non-traumatic rhabdomyolysis in April of 2022 where she received IV synthroid during that admission. PAST MEDICAL / SURGICAL / SOCIAL / FAMILY HISTORY      has a past medical history of Carpal tunnel syndrome, Enlarged thyroid, H/O D&C (2001), H/O LEEP (1997), H/O SAB x 4, Incomplete miscarriage, Miscarriage, Psychiatric problem, Thyroid disease, and Thyroiditis. has a past surgical history that includes LEEP;  Cholecystectomy; Dilation and curettage of uterus; Dilation and curettage of uterus (06/11/2017); pr induced abortn by dil/evac (N/A, 6/11/2017); and Thyroidectomy. Social History     Socioeconomic History    Marital status:      Spouse name: Not on file    Number of children: Not on file    Years of education: Not on file    Highest education level: Not on file   Occupational History    Not on file   Tobacco Use    Smoking status: Current Every Day Smoker     Packs/day: 0.50     Years: 23.00     Pack years: 11.50     Types: Cigarettes    Smokeless tobacco: Never Used   Substance and Sexual Activity    Alcohol use: No    Drug use: No    Sexual activity: Not on file   Other Topics Concern    Not on file   Social History Narrative    Not on file     Social Determinants of Health     Financial Resource Strain:     Difficulty of Paying Living Expenses: Not on file   Food Insecurity:     Worried About Running Out of Food in the Last Year: Not on file    [place] of Food in the Last Year: Not on file   Transportation Needs:     Lack of Transportation (Medical): Not on file    Lack of Transportation (Non-Medical):  Not on file   Physical Activity:     Days of Exercise per Week: Not on file    Minutes of Exercise per Session: Not on file   Stress:     Feeling of Stress : Not on file   Social Connections:     Frequency of Communication with Friends and Family: Not on file    Frequency of Social Gatherings with Friends and Family: Not on file    Attends Congregational Services: Not on file    Active Member of Clubs or Organizations: Not on file    Attends Club or Organization Meetings: Not on file    Marital Status: Not on file   Intimate Partner Violence:     Fear of Current or Ex-Partner: Not on file    Emotionally Abused: Not on file    Physically Abused: Not on file    Sexually Abused: Not on file   Housing Stability:     Unable to Pay for Housing in the Last Year: Not on file    Number of [place] in the Last Year: Not on file    Unstable Housing in the Resp 16   SpO2 96%     Physical Exam  Constitutional:       Appearance: She is not diaphoretic. HENT:      Head: Normocephalic and atraumatic. Eyes:      Extraocular Movements: Extraocular movements intact. Cardiovascular:      Rate and Rhythm: Tachycardia present. Heart sounds: Normal heart sounds. Pulmonary:      Effort: Pulmonary effort is normal. No respiratory distress. Breath sounds: Normal breath sounds. Abdominal:      Palpations: Abdomen is soft. Tenderness: There is no abdominal tenderness. Musculoskeletal:         General: Tenderness present. No swelling. Cervical back: Normal range of motion. Comments: Diffuse tenderness in her upper and lower extremities. Skin:     Findings: No rash. Neurological:      General: No focal deficit present. Mental Status: She is alert and oriented to person, place, and time. Motor: No weakness.          DIFFERENTIAL  DIAGNOSIS     PLAN (LABS / IMAGING / EKG):  Orders Placed This Encounter   Procedures    CBC with Auto Differential    Comprehensive Metabolic Panel    TSH with Reflex    T4, Free    Urinalysis with Microscopic    Magnesium    CK    MYOGLOBIN, SERUM    HCG Qualitative, Serum    Inpatient consult to Hospitalist       MEDICATIONS ORDERED:  Orders Placed This Encounter   Medications    morphine injection 4 mg    0.9 % sodium chloride bolus    cefTRIAXone (ROCEPHIN) 1000 mg IVPB in NS 50ml minibag     Order Specific Question:   Antimicrobial Indications     Answer:   Urinary Tract Infection    potassium chloride 10 mEq/100 mL IVPB (Peripheral Line)    potassium bicarb-citric acid (EFFER-K) effervescent tablet 40 mEq       DDX: rhabdomyolysis, hypothyroidism    DIAGNOSTIC RESULTS / EMERGENCY DEPARTMENT COURSE / MDM   LAB RESULTS:  Results for orders placed or performed during the hospital encounter of 07/11/22   CBC with Auto Differential   Result Value Ref Range    WBC 15.9 (H) 3.5 - 11.3 k/uL    RBC 4. 34 3.95 - 5.11 m/uL    Hemoglobin 13.5 11.9 - 15.1 g/dL    Hematocrit 38.5 36.3 - 47.1 %    MCV 88.7 82.6 - 102.9 fL    MCH 31.1 25.2 - 33.5 pg    MCHC 35.1 (H) 28.4 - 34.8 g/dL    RDW 14.1 11.8 - 14.4 %    Platelets 450 154 - 734 k/uL    MPV 12.9 8.1 - 13.5 fL    NRBC Automated 0.0 0.0 per 100 WBC    Immature Granulocytes 0 0 %    Seg Neutrophils 56 36 - 66 %    Lymphocytes 31 24 - 44 %    Atypical Lymphocytes 6 %    Monocytes 4 1 - 7 %    Eosinophils % 2 1 - 4 %    Basophils 1 0 - 2 %    Absolute Immature Granulocyte 0.00 0.00 - 0.30 k/uL    Segs Absolute 8.90 (H) 1.8 - 7.7 k/uL    Absolute Lymph # 4.93 (H) 1.0 - 4.8 k/uL    Atypical Lymphocytes Absolute 0.95 k/uL    Absolute Mono # 0.64 0.1 - 0.8 k/uL    Absolute Eos # 0.32 0.0 - 0.4 k/uL    Basophils Absolute 0.16 0.0 - 0.2 k/uL    Morphology Normal    Comprehensive Metabolic Panel   Result Value Ref Range    Glucose 159 (H) 70 - 99 mg/dL    BUN 9 6 - 20 mg/dL    CREATININE 1.00 (H) 0.50 - 0.90 mg/dL    Calcium 10.2 8.6 - 10.4 mg/dL    Sodium 134 (L) 135 - 144 mmol/L    Potassium 3.1 (L) 3.7 - 5.3 mmol/L    Chloride 94 (L) 98 - 107 mmol/L    CO2 23 20 - 31 mmol/L    Anion Gap 17 9 - 17 mmol/L    Alkaline Phosphatase 80 35 - 104 U/L    ALT 26 5 - 33 U/L    AST 31 <32 U/L    Total Bilirubin 0.19 (L) 0.3 - 1.2 mg/dL    Total Protein 7.2 6.4 - 8.3 g/dL    Albumin 4.4 3.5 - 5.2 g/dL    Albumin/Globulin Ratio 1.6 1.0 - 2.5    GFR Non-African American >60 >60 mL/min    GFR African American >60 >60 mL/min    GFR Comment         TSH with Reflex   Result Value Ref Range    .80 (H) 0.30 - 5.00 uIU/mL   T4, Free   Result Value Ref Range    Thyroxine, Free 0.12 (L) 0.93 - 1.70 ng/dL   Urinalysis with Microscopic   Result Value Ref Range    Color, UA Yellow Yellow    Turbidity UA Cloudy (A) Clear    Glucose, Ur NEGATIVE NEGATIVE    Bilirubin Urine NEGATIVE NEGATIVE    Ketones, Urine NEGATIVE NEGATIVE    Specific Gravity, UA 1.011 1.005 - 1.030    Urine Hgb NEGATIVE NEGATIVE    pH, UA 7.0 5.0 - 8.0    Protein, UA 2+ (A) NEGATIVE    Urobilinogen, Urine Normal Normal    Nitrite, Urine NEGATIVE NEGATIVE    Leukocyte Esterase, Urine TRACE (A) NEGATIVE    -          WBC, UA 5 TO 10 0 - 5 /HPF    RBC, UA 0 TO 2 0 - 2 /HPF    Epithelial Cells UA 10 TO 20 0 - 5 /HPF    Bacteria, UA MODERATE (A) None   Magnesium   Result Value Ref Range    Magnesium 1.8 1.6 - 2.6 mg/dL   CK   Result Value Ref Range    Total  (H) 26 - 192 U/L   MYOGLOBIN, SERUM   Result Value Ref Range    Myoglobin 86 (H) 25 - 58 ng/mL   HCG Qualitative, Serum   Result Value Ref Range    hCG Qual NEGATIVE NEGATIVE       IMPRESSION:  35 y.o. female with a past medical history of chronic Hashimoto's thyroiditis, s/p surgical thyroidectomy in 2019, hypothyroid myopathy who presents with diffuse muscle pain. Patient was advised to come to the emergency department by her endocrinologist due to the possible need for IV synthroid as patient has trouble absorbing this medication. Laboratory work-up remarkable for elevated TSH of 302.8 ,low T4 of 0.12, and potassium of 3.1. Patient found to have mildly elevated CK levels concerning for rhabdomyolysis. Patient given 1 L normal saline fluids, 4 mg of morphine for pain control, potassium replacement, and one dose of rocephin for UTI. EMERGENCY DEPARTMENT COURSE:  ED Course as of 07/12/22 0132 Mon Jul 11, 2022   2252 Reevaluated patient. Her pain is much better after starting of fluids and receiving morphine. Heart rate and blood pressure have improved. [KR]   2336 Reevaluated patient. Patient resting comfortably. Updated on the plan to admit her and she is agreeable. [KR]   Tue Jul 12, 2022   0022 Nurse updated that patient could not tolerate IV potassium even with dilution with normal saline so infusion was stopped. Oral potassium replacement tolerated well.  [KR]      ED Course User Index  [KR] Hazel Uribe MD       No notes of [facility] Admission Criteria type on file.      CONSULTS:  IP CONSULT TO HOSPITALIST      FINAL IMPRESSION      1. Hypothyroidism due to Hashimoto's thyroiditis    2. Non-traumatic rhabdomyolysis          DISPOSITION / PLAN     DISPOSITION    Decision to admit for mild non-traumatic rhabdomyolysis and management of hypothyroidism    PATIENT REFERRED TO:  No follow-up provider specified.     DISCHARGE MEDICATIONS:  New Prescriptions    No medications on file       Pecola Goldberg, MD  Emergency Medicine Resident    (Please note that portions of thisnote were completed with a voice recognition program.  Efforts were made to edit the dictations but occasionally words are mis-transcribed.)       Pecola Goldberg, MD  Resident  07/12/22 5980

## 2022-07-12 NOTE — ED PROVIDER NOTES
[facility] ED  Emergency Department  Emergency Medicine Resident Sign-out     Care of Dougie Olivo was assumed from Dr. Ribeiro Pac and is being seen for Other (possible thyroid storm. Last TSh 191. ) and Muscle Pain  . The patient's initial evaluation and plan have been discussed with the prior provider who initially evaluated the patient.      EMERGENCY DEPARTMENT COURSE / MEDICAL DECISION MAKING:       MEDICATIONS GIVEN:  Orders Placed This Encounter   Medications    morphine injection 4 mg    0.9 % sodium chloride bolus    cefTRIAXone (ROCEPHIN) 1000 mg IVPB in NS 50ml minibag     Order Specific Question:   Antimicrobial Indications     Answer:   Urinary Tract Infection    potassium chloride 10 mEq/100 mL IVPB (Peripheral Line)    potassium bicarb-citric acid (EFFER-K) effervescent tablet 40 mEq       LABS / RADIOLOGY:     Labs Reviewed   CBC WITH AUTO DIFFERENTIAL - Abnormal; Notable for the following components:       Result Value    WBC 15.9 (*)     MCHC 35.1 (*)     Segs Absolute 8.90 (*)     Absolute Lymph # 4.93 (*)     All other components within normal limits   COMPREHENSIVE METABOLIC PANEL - Abnormal; Notable for the following components:    Glucose 159 (*)     CREATININE 1.00 (*)     Sodium 134 (*)     Potassium 3.1 (*)     Chloride 94 (*)     Total Bilirubin 0.19 (*)     All other components within normal limits   TSH WITH REFLEX - Abnormal; Notable for the following components:    .80 (*)     All other components within normal limits   T4, FREE - Abnormal; Notable for the following components:    Thyroxine, Free 0.12 (*)     All other components within normal limits   URINALYSIS WITH MICROSCOPIC - Abnormal; Notable for the following components:    Turbidity UA Cloudy (*)     Protein, UA 2+ (*)     Leukocyte Esterase, Urine TRACE (*)     Bacteria, UA MODERATE (*)     All other components within normal limits   CK - Abnormal; Notable for the following components: Total  (*)     All other components within normal limits   MYOGLOBIN, SERUM - Abnormal; Notable for the following components:    Myoglobin 86 (*)     All other components within normal limits   MAGNESIUM   HCG, SERUM, QUALITATIVE       No results found. RECENT VITALS:     Temp: 98.4 °F (36.9 °C),  Heart Rate: 84, Resp: 16, BP: (!) 161/98, SpO2: 96 %      This patient is a 35 y.o. Female with past with history of Hashimoto's thyroiditis who initially presented with myalgias. Sent in by her endocrinologist as patient was having complains of myalgias consistent with prior episodes of thyroid myopathy. Elevated CK myoglobin. TSH high with T4 low. Patient did have hypokalemia which was repleted. Patient did receive dose of Rocephin for urinary tract infection, patient received dose of morphine for pain. Plan for admission for thyroid myopathy. ED Course as of 07/12/22 0129   Mon Jul 11, 2022   2252 Reevaluated patient. Her pain is much better after starting of fluids and receiving morphine. Heart rate and blood pressure have improved. [KR]   2336 Reevaluated patient. Patient resting comfortably. Updated on the plan to admit her and she is agreeable. [KR]   Tue Jul 12, 2022   0022 Nurse updated that patient could not tolerate IV potassium even with dilution with normal saline so infusion was stopped. Oral potassium replacement tolerated well. [KR]      ED Course User Index  [KR] Juwan Cao MD       OUTSTANDING TASKS / RECOMMENDATIONS:    1. Awaiting [facility]     FINAL IMPRESSION:     1. Hypothyroidism due to Hashimoto's thyroiditis    2. Non-traumatic rhabdomyolysis        DISPOSITION:         DISPOSITION:  []  Discharge   []  Transfer -    [x]  Admission -     []  Against Medical Advice   []  Eloped   FOLLOW-UP: No follow-up provider specified.    DISCHARGE MEDICATIONS: New Prescriptions    No medications on file          Tony Sky DO  Emergency Medicine Resident  Adan DO Andres  Resident  07/12/22 6653

## 2022-07-12 NOTE — PROGRESS NOTES
I did attempt to contact [facility] for transportation of the patient for hospitalization at a facility that has endocrinology in house per request of attending physician for [facility].   I did speak with [facility] and they stated they would put the patient on a wait list and they would contact us back in the next 24 hours to reevaluate and potentially facilitate transport to their hospital.    Electronically signed by Emilie Gambino DO on 7/12/2022 at 6:36 AM

## 2022-07-13 LAB
ABSOLUTE EOS #: 0.35 K/UL (ref 0–0.44)
ABSOLUTE IMMATURE GRANULOCYTE: 0.11 K/UL (ref 0–0.3)
ABSOLUTE LYMPH #: 3.98 K/UL (ref 1.1–3.7)
ABSOLUTE MONO #: 0.52 K/UL (ref 0.1–1.2)
ADRENOCORTICOTROPIC HORMONE: 21 PG/ML (ref 6–58)
ANION GAP SERPL CALCULATED.3IONS-SCNC: 10 MMOL/L (ref 9–17)
BASOPHILS # BLD: 1 % (ref 0–2)
BASOPHILS ABSOLUTE: 0.16 K/UL (ref 0–0.2)
BUN BLDV-MCNC: 10 MG/DL (ref 6–20)
CALCIUM SERPL-MCNC: 8.4 MG/DL (ref 8.6–10.4)
CHLORIDE BLD-SCNC: 102 MMOL/L (ref 98–107)
CO2: 24 MMOL/L (ref 20–31)
CREAT SERPL-MCNC: 1.1 MG/DL (ref 0.5–0.9)
EOSINOPHILS RELATIVE PERCENT: 3 % (ref 1–4)
GFR AFRICAN AMERICAN: >60 ML/MIN
GFR NON-AFRICAN AMERICAN: 54 ML/MIN
GFR SERPL CREATININE-BSD FRML MDRD: ABNORMAL ML/MIN/{1.73_M2}
GLUCOSE BLD-MCNC: 110 MG/DL (ref 70–99)
HCT VFR BLD CALC: 38.9 % (ref 36.3–47.1)
HEMOGLOBIN: 12.4 G/DL (ref 11.9–15.1)
IMMATURE GRANULOCYTES: 1 %
LYMPHOCYTES # BLD: 35 % (ref 24–43)
MAGNESIUM: 1.7 MG/DL (ref 1.6–2.6)
MCH RBC QN AUTO: 30.4 PG (ref 25.2–33.5)
MCHC RBC AUTO-ENTMCNC: 31.9 G/DL (ref 28.4–34.8)
MCV RBC AUTO: 95.3 FL (ref 82.6–102.9)
MONOCYTES # BLD: 5 % (ref 3–12)
MYOGLOBIN: 36 NG/ML (ref 25–58)
NRBC AUTOMATED: 0 PER 100 WBC
PDW BLD-RTO: 14.5 % (ref 11.8–14.4)
PLATELET # BLD: 198 K/UL (ref 138–453)
PMV BLD AUTO: 12.9 FL (ref 8.1–13.5)
POTASSIUM SERPL-SCNC: 3.5 MMOL/L (ref 3.7–5.3)
RBC # BLD: 4.08 M/UL (ref 3.95–5.11)
RBC # BLD: ABNORMAL 10*6/UL
SEG NEUTROPHILS: 56 % (ref 36–65)
SEGMENTED NEUTROPHILS ABSOLUTE COUNT: 6.41 K/UL (ref 1.5–8.1)
SODIUM BLD-SCNC: 136 MMOL/L (ref 135–144)
WBC # BLD: 11.5 K/UL (ref 3.5–11.3)

## 2022-07-13 PROCEDURE — 6370000000 HC RX 637 (ALT 250 FOR IP): Performed by: PHYSICIAN ASSISTANT

## 2022-07-13 PROCEDURE — 83874 ASSAY OF MYOGLOBIN: CPT

## 2022-07-13 PROCEDURE — 83735 ASSAY OF MAGNESIUM: CPT

## 2022-07-13 PROCEDURE — 2060000000 HC ICU INTERMEDIATE R&B

## 2022-07-13 PROCEDURE — 99232 SBSQ HOSP IP/OBS MODERATE 35: CPT | Performed by: PHYSICIAN ASSISTANT

## 2022-07-13 PROCEDURE — 36415 COLL VENOUS BLD VENIPUNCTURE: CPT

## 2022-07-13 PROCEDURE — 6370000000 HC RX 637 (ALT 250 FOR IP): Performed by: NURSE PRACTITIONER

## 2022-07-13 PROCEDURE — 2500000003 HC RX 250 WO HCPCS: Performed by: PHYSICIAN ASSISTANT

## 2022-07-13 PROCEDURE — 85025 COMPLETE CBC W/AUTO DIFF WBC: CPT

## 2022-07-13 PROCEDURE — 2580000003 HC RX 258: Performed by: NURSE PRACTITIONER

## 2022-07-13 PROCEDURE — 6360000002 HC RX W HCPCS: Performed by: PHYSICIAN ASSISTANT

## 2022-07-13 PROCEDURE — 6370000000 HC RX 637 (ALT 250 FOR IP)

## 2022-07-13 PROCEDURE — 76937 US GUIDE VASCULAR ACCESS: CPT

## 2022-07-13 PROCEDURE — 80048 BASIC METABOLIC PNL TOTAL CA: CPT

## 2022-07-13 PROCEDURE — 82024 ASSAY OF ACTH: CPT

## 2022-07-13 PROCEDURE — 6360000002 HC RX W HCPCS: Performed by: NURSE PRACTITIONER

## 2022-07-13 RX ORDER — AMLODIPINE BESYLATE 10 MG/1
10 TABLET ORAL DAILY
Status: DISCONTINUED | OUTPATIENT
Start: 2022-07-14 | End: 2022-07-17 | Stop reason: HOSPADM

## 2022-07-13 RX ORDER — AMLODIPINE BESYLATE 10 MG/1
10 TABLET ORAL DAILY
Status: DISCONTINUED | OUTPATIENT
Start: 2022-07-13 | End: 2022-07-13

## 2022-07-13 RX ORDER — COSYNTROPIN 0.25 MG/ML
250 INJECTION, POWDER, FOR SOLUTION INTRAMUSCULAR; INTRAVENOUS ONCE
Status: COMPLETED | OUTPATIENT
Start: 2022-07-14 | End: 2022-07-14

## 2022-07-13 RX ADMIN — MORPHINE SULFATE 2 MG: 2 INJECTION, SOLUTION INTRAMUSCULAR; INTRAVENOUS at 08:58

## 2022-07-13 RX ADMIN — MORPHINE SULFATE 2 MG: 2 INJECTION, SOLUTION INTRAMUSCULAR; INTRAVENOUS at 17:34

## 2022-07-13 RX ADMIN — PREGABALIN 100 MG: 100 CAPSULE ORAL at 14:48

## 2022-07-13 RX ADMIN — SODIUM CHLORIDE, PRESERVATIVE FREE 10 ML: 5 INJECTION INTRAVENOUS at 21:14

## 2022-07-13 RX ADMIN — POTASSIUM CHLORIDE 40 MEQ: 1500 TABLET, EXTENDED RELEASE ORAL at 08:58

## 2022-07-13 RX ADMIN — POTASSIUM CHLORIDE AND SODIUM CHLORIDE: 900; 150 INJECTION, SOLUTION INTRAVENOUS at 02:10

## 2022-07-13 RX ADMIN — ACETAMINOPHEN 650 MG: 325 TABLET ORAL at 04:28

## 2022-07-13 RX ADMIN — Medication 1000 UNITS: at 08:31

## 2022-07-13 RX ADMIN — LEVOTHYROXINE SODIUM ANHYDROUS 100 MCG: 100 INJECTION, POWDER, LYOPHILIZED, FOR SOLUTION INTRAVENOUS at 06:04

## 2022-07-13 RX ADMIN — FOLIC ACID 1 MG: 1 TABLET ORAL at 08:30

## 2022-07-13 RX ADMIN — FAMOTIDINE 20 MG: 20 TABLET, FILM COATED ORAL at 21:13

## 2022-07-13 RX ADMIN — ACETAMINOPHEN 650 MG: 325 TABLET ORAL at 18:23

## 2022-07-13 RX ADMIN — FAMOTIDINE 20 MG: 20 TABLET, FILM COATED ORAL at 08:30

## 2022-07-13 RX ADMIN — SODIUM CHLORIDE, PRESERVATIVE FREE 10 ML: 5 INJECTION INTRAVENOUS at 08:31

## 2022-07-13 RX ADMIN — LEVOTHYROXINE SODIUM 200 MCG: 100 TABLET ORAL at 06:04

## 2022-07-13 RX ADMIN — MORPHINE SULFATE 2 MG: 2 INJECTION, SOLUTION INTRAMUSCULAR; INTRAVENOUS at 02:13

## 2022-07-13 RX ADMIN — LIOTHYRONINE SODIUM 50 MCG: 25 TABLET ORAL at 08:30

## 2022-07-13 RX ADMIN — PREGABALIN 100 MG: 100 CAPSULE ORAL at 08:30

## 2022-07-13 RX ADMIN — POTASSIUM BICARBONATE 40 MEQ: 782 TABLET, EFFERVESCENT ORAL at 08:32

## 2022-07-13 RX ADMIN — MORPHINE SULFATE 2 MG: 2 INJECTION, SOLUTION INTRAMUSCULAR; INTRAVENOUS at 21:18

## 2022-07-13 RX ADMIN — AMLODIPINE BESYLATE 5 MG: 5 TABLET ORAL at 08:31

## 2022-07-13 RX ADMIN — THYROID, PORCINE 300 MG: 180 TABLET ORAL at 08:28

## 2022-07-13 RX ADMIN — PREGABALIN 100 MG: 100 CAPSULE ORAL at 21:13

## 2022-07-13 RX ADMIN — POTASSIUM CHLORIDE AND SODIUM CHLORIDE: 900; 150 INJECTION, SOLUTION INTRAVENOUS at 23:42

## 2022-07-13 ASSESSMENT — PAIN DESCRIPTION - LOCATION
LOCATION: KNEE
LOCATION: GENERALIZED;HEAD
LOCATION: KNEE
LOCATION: HEAD

## 2022-07-13 ASSESSMENT — PAIN DESCRIPTION - DESCRIPTORS
DESCRIPTORS: ACHING

## 2022-07-13 ASSESSMENT — PAIN SCALES - GENERAL
PAINLEVEL_OUTOF10: 8
PAINLEVEL_OUTOF10: 6

## 2022-07-13 ASSESSMENT — PAIN DESCRIPTION - ORIENTATION
ORIENTATION: ANTERIOR
ORIENTATION: LEFT;RIGHT
ORIENTATION: LEFT;RIGHT

## 2022-07-13 NOTE — CARE COORDINATION
[facility] Flow/Interdisciplinary Rounds Progress Note    Quality Flow Rounds held on July 13, 2022 at [address] [facility] Attending:  Bedside Nurse, ,  and Nursing Unit Leadership    Barriers to Discharge: Bed availability    Anticipated Discharge Date:       Anticipated Discharge Disposition: Transfer to [facility]    Readmission Risk              Risk of Unplanned Readmission:  21           Discussed patient goal for the day, patient clinical progression, and barriers to discharge. The following Goal(s) of the Day/Commitment(s) have been identified:  Activity Progression  Transitional Care Plan and Transfer - [address] [place] [address] Sixto Wilburn [address] is for transfer to [facility].   Per [facility] notes, they spoke with [facility] where patient has been accepted, still waiting on bed    Laurie Lauren RN  July 13, 2022

## 2022-07-13 NOTE — PROGRESS NOTES
[facility] at [phone] to verify acceptance and place on wait list. They confirmed that the pt was accepted to [facility], but that we are waiting on abed.

## 2022-07-14 LAB
ANION GAP SERPL CALCULATED.3IONS-SCNC: 10 MMOL/L (ref 9–17)
BUN BLDV-MCNC: 9 MG/DL (ref 6–20)
CALCIUM SERPL-MCNC: 8.8 MG/DL (ref 8.6–10.4)
CHLORIDE BLD-SCNC: 101 MMOL/L (ref 98–107)
CO2: 27 MMOL/L (ref 20–31)
CORTISOL: 11.5 UG/DL (ref 2.7–18.4)
CORTISOL: 21.4 UG/DL (ref 2.7–18.4)
CORTISOL: 28 UG/DL (ref 2.7–18.4)
CREAT SERPL-MCNC: 0.93 MG/DL (ref 0.5–0.9)
GFR AFRICAN AMERICAN: >60 ML/MIN
GFR NON-AFRICAN AMERICAN: >60 ML/MIN
GFR SERPL CREATININE-BSD FRML MDRD: ABNORMAL ML/MIN/{1.73_M2}
GLUCOSE BLD-MCNC: 118 MG/DL (ref 70–99)
MYOGLOBIN: 40 NG/ML (ref 25–58)
POTASSIUM SERPL-SCNC: 4.5 MMOL/L (ref 3.7–5.3)
REASON FOR REJECTION: NORMAL
SODIUM BLD-SCNC: 138 MMOL/L (ref 135–144)
T3 FREE: 1.2 PG/ML (ref 2.02–4.43)
THYROXINE, FREE: 0.54 NG/DL (ref 0.93–1.7)
TSH SERPL DL<=0.05 MIU/L-ACNC: 255.8 UIU/ML (ref 0.3–5)
ZZ NTE CLEAN UP: ORDERED TEST: NORMAL
ZZ NTE WITH NAME CLEAN UP: SPECIMEN SOURCE: NORMAL

## 2022-07-14 PROCEDURE — 6370000000 HC RX 637 (ALT 250 FOR IP): Performed by: NURSE PRACTITIONER

## 2022-07-14 PROCEDURE — 80048 BASIC METABOLIC PNL TOTAL CA: CPT

## 2022-07-14 PROCEDURE — 84443 ASSAY THYROID STIM HORMONE: CPT

## 2022-07-14 PROCEDURE — 84439 ASSAY OF FREE THYROXINE: CPT

## 2022-07-14 PROCEDURE — 94761 N-INVAS EAR/PLS OXIMETRY MLT: CPT

## 2022-07-14 PROCEDURE — 2500000003 HC RX 250 WO HCPCS: Performed by: PHYSICIAN ASSISTANT

## 2022-07-14 PROCEDURE — 84481 FREE ASSAY (FT-3): CPT

## 2022-07-14 PROCEDURE — 82533 TOTAL CORTISOL: CPT

## 2022-07-14 PROCEDURE — 99232 SBSQ HOSP IP/OBS MODERATE 35: CPT | Performed by: PHYSICIAN ASSISTANT

## 2022-07-14 PROCEDURE — 83874 ASSAY OF MYOGLOBIN: CPT

## 2022-07-14 PROCEDURE — 6360000002 HC RX W HCPCS: Performed by: STUDENT IN AN ORGANIZED HEALTH CARE EDUCATION/TRAINING PROGRAM

## 2022-07-14 PROCEDURE — 6370000000 HC RX 637 (ALT 250 FOR IP): Performed by: PHYSICIAN ASSISTANT

## 2022-07-14 PROCEDURE — 6360000002 HC RX W HCPCS: Performed by: NURSE PRACTITIONER

## 2022-07-14 PROCEDURE — 2580000003 HC RX 258: Performed by: NURSE PRACTITIONER

## 2022-07-14 PROCEDURE — 6370000000 HC RX 637 (ALT 250 FOR IP): Performed by: STUDENT IN AN ORGANIZED HEALTH CARE EDUCATION/TRAINING PROGRAM

## 2022-07-14 PROCEDURE — 2060000000 HC ICU INTERMEDIATE R&B

## 2022-07-14 PROCEDURE — 6360000002 HC RX W HCPCS: Performed by: PHYSICIAN ASSISTANT

## 2022-07-14 PROCEDURE — 36415 COLL VENOUS BLD VENIPUNCTURE: CPT

## 2022-07-14 PROCEDURE — 6370000000 HC RX 637 (ALT 250 FOR IP)

## 2022-07-14 RX ORDER — CYCLOBENZAPRINE HCL 10 MG
10 TABLET ORAL 3 TIMES DAILY PRN
Status: DISCONTINUED | OUTPATIENT
Start: 2022-07-14 | End: 2022-07-17 | Stop reason: HOSPADM

## 2022-07-14 RX ORDER — LIOTHYRONINE SODIUM 5 UG/1
5 TABLET ORAL EVERY 8 HOURS
Status: DISCONTINUED | OUTPATIENT
Start: 2022-07-14 | End: 2022-07-17

## 2022-07-14 RX ORDER — LEVOTHYROXINE SODIUM ANHYDROUS 100 UG/5ML
200 INJECTION, POWDER, LYOPHILIZED, FOR SOLUTION INTRAVENOUS DAILY
Status: DISCONTINUED | OUTPATIENT
Start: 2022-07-15 | End: 2022-07-17 | Stop reason: HOSPADM

## 2022-07-14 RX ORDER — SODIUM CHLORIDE 9 MG/ML
5 INJECTION INTRAVENOUS DAILY
Status: DISCONTINUED | OUTPATIENT
Start: 2022-07-15 | End: 2022-07-17 | Stop reason: HOSPADM

## 2022-07-14 RX ADMIN — AMLODIPINE BESYLATE 10 MG: 10 TABLET ORAL at 08:30

## 2022-07-14 RX ADMIN — SODIUM CHLORIDE, PRESERVATIVE FREE 10 ML: 5 INJECTION INTRAVENOUS at 08:31

## 2022-07-14 RX ADMIN — FOLIC ACID 1 MG: 1 TABLET ORAL at 08:31

## 2022-07-14 RX ADMIN — ACETAMINOPHEN 650 MG: 325 TABLET ORAL at 03:34

## 2022-07-14 RX ADMIN — LIOTHYRONINE SODIUM 5 MCG: 5 TABLET ORAL at 18:44

## 2022-07-14 RX ADMIN — LEVOTHYROXINE SODIUM ANHYDROUS 100 MCG: 100 INJECTION, POWDER, LYOPHILIZED, FOR SOLUTION INTRAVENOUS at 06:33

## 2022-07-14 RX ADMIN — PREGABALIN 100 MG: 100 CAPSULE ORAL at 20:43

## 2022-07-14 RX ADMIN — PREGABALIN 100 MG: 100 CAPSULE ORAL at 14:45

## 2022-07-14 RX ADMIN — MORPHINE SULFATE 2 MG: 2 INJECTION, SOLUTION INTRAMUSCULAR; INTRAVENOUS at 03:34

## 2022-07-14 RX ADMIN — MORPHINE SULFATE 2 MG: 2 INJECTION, SOLUTION INTRAMUSCULAR; INTRAVENOUS at 12:24

## 2022-07-14 RX ADMIN — LIOTHYRONINE SODIUM 50 MCG: 25 TABLET ORAL at 08:29

## 2022-07-14 RX ADMIN — Medication 1000 UNITS: at 08:29

## 2022-07-14 RX ADMIN — SODIUM CHLORIDE, PRESERVATIVE FREE 10 ML: 5 INJECTION INTRAVENOUS at 20:43

## 2022-07-14 RX ADMIN — LEVOTHYROXINE SODIUM 200 MCG: 100 TABLET ORAL at 06:33

## 2022-07-14 RX ADMIN — FAMOTIDINE 20 MG: 20 TABLET, FILM COATED ORAL at 20:43

## 2022-07-14 RX ADMIN — PREGABALIN 100 MG: 100 CAPSULE ORAL at 08:31

## 2022-07-14 RX ADMIN — THYROID, PORCINE 300 MG: 180 TABLET ORAL at 08:30

## 2022-07-14 RX ADMIN — POTASSIUM BICARBONATE 40 MEQ: 782 TABLET, EFFERVESCENT ORAL at 08:31

## 2022-07-14 RX ADMIN — COSYNTROPIN 250 MCG: 0.25 INJECTION, POWDER, LYOPHILIZED, FOR SOLUTION INTRAVENOUS at 07:56

## 2022-07-14 RX ADMIN — POTASSIUM CHLORIDE AND SODIUM CHLORIDE: 900; 150 INJECTION, SOLUTION INTRAVENOUS at 12:27

## 2022-07-14 RX ADMIN — FAMOTIDINE 20 MG: 20 TABLET, FILM COATED ORAL at 08:29

## 2022-07-14 ASSESSMENT — PAIN DESCRIPTION - DESCRIPTORS
DESCRIPTORS: ACHING
DESCRIPTORS: ACHING

## 2022-07-14 ASSESSMENT — PAIN SCALES - GENERAL
PAINLEVEL_OUTOF10: 7
PAINLEVEL_OUTOF10: 7

## 2022-07-14 ASSESSMENT — PAIN DESCRIPTION - LOCATION
LOCATION: GENERALIZED
LOCATION: GENERALIZED

## 2022-07-14 ASSESSMENT — PAIN DESCRIPTION - PAIN TYPE: TYPE: CHRONIC PAIN

## 2022-07-14 ASSESSMENT — PAIN DESCRIPTION - ORIENTATION
ORIENTATION: LEFT;RIGHT
ORIENTATION: RIGHT;LEFT

## 2022-07-14 NOTE — PROGRESS NOTES
[facility]  Office: [phone]  Sean Faye, DO, Yudith Overton, DO, Negrito Miller, DO, Linda Torres, DO, Ebre Dennis MD, Jose Rodriguez MD, Claudia Pate MD, Joaquina Simmons MD, Rochelle Wong MD, Blanco Troncoso MD, Ian Fonseca MD, Padmini Argueta DO, Nicolasa Briceño MD,  Radha Friday, DO, Nasima Ray MD, Criselda Menendez MD, Esau Rascon DO, Toby Kirk MD, Nabeel Alcaraz MD, Ruddy Jacob, DO, Peri Winters MD, Rosette Hernandez MD, Dimitris Pink, [facility], [facility], CNP, Nydia Caballero, CNP, Claire Herr, CNP, Lorri Jasso, CNP, Tracie Menendez, CNP, [facility], Cynthia Resendiz [facility], Guilherme Sanders, CNP, Yasmine Galindo, CNP, [facility], C[facility], Edy Mueller, [facility], Juanito [facility], [facility], Emmanuel Burleson, CNP, [facility], [facility], Ranulfo Jackson, [address] [place] [address]    Progress Note    7/14/2022    7:44 AM    Name:   Delmi Breen  MRN:     [id number]     [place]:      [de-identified]   Room:   [address] [place] [address] Day:  2  Admit Date:  7/11/2022  9:37 PM    PCP:   Ludivina Gan MD  Code Status:  Full Code    Subjective:     C/C:   Chief Complaint   Patient presents with    Other     possible thyroid storm. Last TSh 191.  Muscle Pain     Interval History Status: not changed. Pt continues to feel fatigued. She complains of muscle pain in her upper and lower extremities. She is otherwise is denying new complaints. Awaiting transfer to [facility]. Brief History:     Delmi Breen is a 35 y.o. female with past medical history detailed below. Patient presented to the emergency department complaining of fatigue, muscle weakness/pain.     Patient has a longstanding history of hypothyroidism that she has struggled with. She had a total thyroidectomy in 2019 for multinodular goiter. She is intermittently hospitalized with symptoms consistent with profound hypothyroidism.   Typically her symptoms consist of fatigue and muscle weakness/pain. She presented to the emergency department complaining of worsening fatigue, weakness, muscle pain. She takes extremely large dose of multiple thyroid hormones including 300 mcg of levothyroxine, 300 mg of [place] Thyroid, liothyronine 100 mcg daily. She reports compliance with her meds. There has been concern in the past regarding absorption. In the emergency department, she was found to have TSH over 300, thyroxine 0.12. , myoglobin 86. Potassium noted to be 3.1 on admission. Patient was admitted to the medicine service.       Review of Systems:     Constitutional:  negative for chills, fevers, sweats  Respiratory:  negative for cough, dyspnea on exertion, shortness of breath, wheezing  Cardiovascular:  negative for chest pain, chest pressure/discomfort, lower extremity edema, palpitations  Gastrointestinal:  negative for abdominal pain, constipation, diarrhea, nausea, vomiting  Neurological:  negative for dizziness, headache    Medications: Allergies:     Allergies   Allergen Reactions    Doxycycline Anaphylaxis       Current Meds:   Scheduled Meds:    cosyntropin  250 mcg IntraVENous Once    amLODIPine  10 mg Oral Daily    famotidine  20 mg Oral BID    folic acid  1 mg Oral Daily    liothyronine  50 mcg Oral Daily    pregabalin  100 mg Oral TID    thyroid  300 mg Oral Daily    Vitamin D  1,000 Units Oral Daily    levothyroxine  200 mcg Oral Daily    sodium chloride flush  5-40 mL IntraVENous 2 times per day    enoxaparin  40 mg SubCUTAneous Daily    levothyroxine  100 mcg IntraVENous Daily    And    sodium chloride (PF)  5 mL IntraVENous Daily    potassium bicarb-citric acid  40 mEq Oral Daily     Continuous Infusions:    sodium chloride Stopped (07/12/22 1134)    0.9% NaCl with KCl 20 mEq 100 mL/hr at 07/13/22 2342     PRN Meds: sodium chloride flush, ondansetron **OR** ondansetron, polyethylene glycol, acetaminophen **OR** Component Value Date/Time    FIO2 INFORMATION NOT PROVIDED 08/01/2021 06:38 PM     Lab Results   Component Value Date/Time    SPECIAL R HAND 20ML 04/28/2022 09:37 AM     Lab Results   Component Value Date/Time    CULTURE NO SIGNIFICANT GROWTH 04/28/2022 02:45 PM       Radiology:  No results found. Physical Examination:        General appearance:  alert, cooperative and no distress  Mental Status:  oriented to person, place and time and normal affect  Lungs:  clear to auscultation bilaterally, normal effort  Heart:  regular rate and rhythm, no murmur  Abdomen:  soft, nontender, nondistended, normal bowel sounds, no masses, hepatomegaly, splenomegaly  Extremities:  Edematous, taut skin in lower extremities  Skin:  no gross lesions, rashes, induration    Assessment:        Hospital Problems           Last Modified POA    * (Principal) Thyroid myopathy 7/12/2022 Yes    Myxedema 7/12/2022 Yes    Primary hypertension 7/12/2022 Yes    Severe hypothyroidism 7/12/2022 Yes    Obesity (BMI 30-39. 9) 7/12/2022 Yes    H/O total thyroidectomy 7/12/2022 Yes    Non-traumatic rhabdomyolysis 7/12/2022 Yes          Plan:        #Severe hypothyroidism/rhabdo  - IV synthroid 100 mcg daily. Continue oral regimen as ordered. Cosyntropin stim test today. Transfer to [address] [place] [address], already accepted.      #Hypertension  - increase amlodipine to 10 mg daily     #Hypokalemia  - will likely need IV potassium as her absorption capacity is likely poor given the status of her thyroid.  Will continue to monitor     Lovenox DVT prophylaxis  PT/OT    Leigh Schaumann, PA-C  7/14/2022  7:44 AM

## 2022-07-14 NOTE — PROGRESS NOTES
[facility]  Office: [phone]  Jess Enrique, DO, Edwinachandan Lopez, DO, [place] Mail, DO, Jes Evans Blood, DO, Vinay Gillespie MD, Ana Lu MD, Cedric William MD, Fabienne Koo MD, Adis Moreno MD, Talia Crawford MD, Mary Proctor MD, Elroy Vivar, DO, Ad Smith MD,  Juliane Zaragoza DO, Carmella Loya MD, Cesar Knutson MD, Sameera Napoles DO, Niya Diaz MD, Louise Bass MD, Gurwinder Blevins, DO, Candido Sosa MD, Mita Palomares MD, Unique Adkins, [facility], [facility], CNP, Luz Maria Martinez, CNP, Cris Raw, CNP, Jenniffer Squires, CNP, Colleen Booty, CNP, Lauren Begum PA-C, Ariadne Peña, DNP, Blanco Kuhn, CNP, Jessica Beat, CNP, Timur Class, CNP, Rudya Petties, CNS, Rod Genaleks, [facility], Emerson Carver, CNP, Audi Sandoval, [facility], Sergio Pin, [address] [place] [address]    Progress Note    7/14/2022    12:06 PM    Name:   Ayo English  MRN:     [id number]     Cassidy Rota:      [de-identified]   Room:   [address] [place] [address] Day:  2  Admit Date:  7/11/2022  9:37 PM    PCP:   Hector Garnica MD  Code Status:  Full Code    Subjective:     C/C:   Chief Complaint   Patient presents with    Other     possible thyroid storm. Last TSh 191.  Muscle Pain     Interval History Status: not changed. Pt continues to feel fatigued. She complains of muscle pain in her upper and lower extremities. She is otherwise is denying new complaints. Awaiting transfer to [facility]. Brief History:     Ayo English is a 35 y.o. female with past medical history detailed below. Patient presented to the emergency department complaining of fatigue, muscle weakness/pain.     Patient has a longstanding history of hypothyroidism that she has struggled with. She had a total thyroidectomy in 2019 for multinodular goiter. She is intermittently hospitalized with symptoms consistent with profound hypothyroidism.   Typically her symptoms consist of fatigue and muscle weakness/pain. She presented to the emergency department complaining of worsening fatigue, weakness, muscle pain. She takes extremely large dose of multiple thyroid hormones including 300 mcg of levothyroxine, 300 mg of [place] Thyroid, liothyronine 100 mcg daily. She reports compliance with her meds. There has been concern in the past regarding absorption. In the emergency department, she was found to have TSH over 300, thyroxine 0.12. , myoglobin 86. Potassium noted to be 3.1 on admission. Patient was admitted to the medicine service.       Review of Systems:     Constitutional:  negative for chills, fevers, sweats  Respiratory:  negative for cough, dyspnea on exertion, shortness of breath, wheezing  Cardiovascular:  negative for chest pain, chest pressure/discomfort, lower extremity edema, palpitations  Gastrointestinal:  negative for abdominal pain, constipation, diarrhea, nausea, vomiting  Neurological:  negative for dizziness, headache    Medications: Allergies:     Allergies   Allergen Reactions    Doxycycline Anaphylaxis       Current Meds:   Scheduled Meds:    [START ON 7/15/2022] levothyroxine  200 mcg IntraVENous Daily    And    [START ON 7/15/2022] sodium chloride (PF)  5 mL IntraVENous Daily    hydrocortisone sodium succinate PF  100 mg IntraVENous Q8H    amLODIPine  10 mg Oral Daily    famotidine  20 mg Oral BID    folic acid  1 mg Oral Daily    liothyronine  50 mcg Oral Daily    pregabalin  100 mg Oral TID    [Held by provider] thyroid  300 mg Oral Daily    Vitamin D  1,000 Units Oral Daily    [Held by provider] levothyroxine  200 mcg Oral Daily    sodium chloride flush  5-40 mL IntraVENous 2 times per day    enoxaparin  40 mg SubCUTAneous Daily    potassium bicarb-citric acid  40 mEq Oral Daily     Continuous Infusions:    sodium chloride Stopped (07/12/22 1134)    0.9% NaCl with KCl 20 mEq 100 mL/hr at 07/13/22 7992     PRN Meds: sodium chloride flush, ondansetron **OR** ondansetron, polyethylene glycol, acetaminophen **OR** acetaminophen, potassium chloride **OR** potassium alternative oral replacement **OR** potassium chloride, morphine    Data:     Past Medical History:   has a past medical history of Carpal tunnel syndrome, Enlarged thyroid, H/O D&C (), H/O LEEP (), H/O SAB x 4, Incomplete miscarriage, Miscarriage, Psychiatric problem, Thyroid disease, and Thyroiditis. Social History:   reports that she has been smoking cigarettes. She has a 11.50 pack-year smoking history. She has never used smokeless tobacco. She reports that she does not drink alcohol and does not use drugs. Family History:   Family History   Problem Relation Age of Onset    Diabetes Mother     Mental Illness Mother     Substance Abuse Father        Vitals:  BP (!) 153/99   Pulse 76   Temp (!) 96.4 °F (35.8 °C) (Oral)   Resp 17   SpO2 96%   Temp (24hrs), Av.5 °F (36.4 °C), Min:96.4 °F (35.8 °C), Max:98.4 °F (36.9 °C)    No results for input(s): POCGLU in the last 72 hours. I/O (24Hr):   No intake or output data in the 24 hours ending 22 1206    Labs:  Hematology:  Recent Labs     22  0603   WBC 15.9* 11.5*   RBC 4.34 4.08   HGB 13.5 12.4   HCT 38.5 38.9   MCV 88.7 95.3   MCH 31.1 30.4   MCHC 35.1* 31.9   RDW 14.1 14.5*    198   MPV 12.9 12.9     Chemistry:  Recent Labs     224 22  1223 22  0603 22  0747   *  --  136 138   K 3.1*  --  3.5* 4.5   CL 94*  --  102 101   CO2 23  --  24 27   GLUCOSE 159*  --  110* 118*   BUN 9  --  10 9   CREATININE 1.00*  --  1.10* 0.93*   MG 1.8  --  1.7  --    ANIONGAP 17  --  10 10   LABGLOM >60  --  54* >60   GFRAA >60  --  >60 >60   CALCIUM 10.2  --  8.4* 8.8   CKTOTAL 612* 472*  --   --    MYOGLOBIN 86*  --  36 40     Recent Labs     22  1223   PROT 7.2  --   --    LABALBU 4.4  --   --    TSH 302.80*   < > 255.50*   AST 31  --   --    ALT 26  --   --    ALKPHOS 80  --   --    BILITOT 0.19*  --   --     < > = values in this interval not displayed. ABG:  Lab Results   Component Value Date/Time    FIO2 INFORMATION NOT PROVIDED 08/01/2021 06:38 PM     Lab Results   Component Value Date/Time    SPECIAL R HAND 20ML 04/28/2022 09:37 AM     Lab Results   Component Value Date/Time    CULTURE NO SIGNIFICANT GROWTH 04/28/2022 02:45 PM       Radiology:  No results found. Physical Examination:        General appearance:  alert, cooperative and no distress  Mental Status:  oriented to person, place and time and normal affect  Lungs:  clear to auscultation bilaterally, normal effort  Heart:  regular rate and rhythm, no murmur  Abdomen:  soft, nontender, nondistended, normal bowel sounds, no masses, hepatomegaly, splenomegaly  Extremities:  Edematous, taut skin in lower extremities  Skin:  no gross lesions, rashes, induration    Assessment:        Hospital Problems           Last Modified POA    * (Principal) Thyroid myopathy 7/12/2022 Yes    Myxedema 7/12/2022 Yes    Primary hypertension 7/12/2022 Yes    Severe hypothyroidism 7/12/2022 Yes    Obesity (BMI 30-39. 9) 7/12/2022 Yes    H/O total thyroidectomy 7/12/2022 Yes    Non-traumatic rhabdomyolysis 7/12/2022 Yes          Plan:        #Severe hypothyroidism/rhabdo  - Increase synthroid to 200 mcg daily. Hold oral thyroid regimen. Cosyntropin stim test with no suggestion of primary adrenal insufficiency. Will stop hydrocortisone. Transfer to [address] [place] [address], already accepted. - continue to monitor CK/myoglobin. Continue IV fluids as ordered     #Hypertension  - increase amlodipine to 10 mg daily     #Hypokalemia  - will likely need IV potassium as her absorption capacity is likely poor given the status of her thyroid.  Will continue to monitor     Lovenox DVT prophylaxis  PT/OT    Addie King PA-C  7/14/2022  12:06 PM

## 2022-07-15 LAB
ANION GAP SERPL CALCULATED.3IONS-SCNC: 11 MMOL/L (ref 9–17)
BUN BLDV-MCNC: 9 MG/DL (ref 6–20)
CALCIUM SERPL-MCNC: 8.8 MG/DL (ref 8.6–10.4)
CHLORIDE BLD-SCNC: 103 MMOL/L (ref 98–107)
CO2: 26 MMOL/L (ref 20–31)
CREAT SERPL-MCNC: 0.85 MG/DL (ref 0.5–0.9)
GFR AFRICAN AMERICAN: >60 ML/MIN
GFR NON-AFRICAN AMERICAN: >60 ML/MIN
GFR SERPL CREATININE-BSD FRML MDRD: ABNORMAL ML/MIN/{1.73_M2}
GLUCOSE BLD-MCNC: 109 MG/DL (ref 70–99)
MYOGLOBIN: 33 NG/ML (ref 25–58)
POTASSIUM SERPL-SCNC: 3.9 MMOL/L (ref 3.7–5.3)
SODIUM BLD-SCNC: 140 MMOL/L (ref 135–144)
TOTAL CK: 293 U/L (ref 26–192)

## 2022-07-15 PROCEDURE — 2500000003 HC RX 250 WO HCPCS: Performed by: PHYSICIAN ASSISTANT

## 2022-07-15 PROCEDURE — 6360000002 HC RX W HCPCS: Performed by: PHYSICIAN ASSISTANT

## 2022-07-15 PROCEDURE — 6370000000 HC RX 637 (ALT 250 FOR IP): Performed by: PHYSICIAN ASSISTANT

## 2022-07-15 PROCEDURE — 80048 BASIC METABOLIC PNL TOTAL CA: CPT

## 2022-07-15 PROCEDURE — 36415 COLL VENOUS BLD VENIPUNCTURE: CPT

## 2022-07-15 PROCEDURE — 2580000003 HC RX 258: Performed by: NURSE PRACTITIONER

## 2022-07-15 PROCEDURE — 6360000002 HC RX W HCPCS: Performed by: NURSE PRACTITIONER

## 2022-07-15 PROCEDURE — 6370000000 HC RX 637 (ALT 250 FOR IP)

## 2022-07-15 PROCEDURE — 83874 ASSAY OF MYOGLOBIN: CPT

## 2022-07-15 PROCEDURE — 82550 ASSAY OF CK (CPK): CPT

## 2022-07-15 PROCEDURE — 2060000000 HC ICU INTERMEDIATE R&B

## 2022-07-15 PROCEDURE — 6370000000 HC RX 637 (ALT 250 FOR IP): Performed by: STUDENT IN AN ORGANIZED HEALTH CARE EDUCATION/TRAINING PROGRAM

## 2022-07-15 PROCEDURE — 99232 SBSQ HOSP IP/OBS MODERATE 35: CPT | Performed by: STUDENT IN AN ORGANIZED HEALTH CARE EDUCATION/TRAINING PROGRAM

## 2022-07-15 PROCEDURE — 6370000000 HC RX 637 (ALT 250 FOR IP): Performed by: NURSE PRACTITIONER

## 2022-07-15 RX ADMIN — LEVOTHYROXINE SODIUM ANHYDROUS 200 MCG: 100 INJECTION, POWDER, LYOPHILIZED, FOR SOLUTION INTRAVENOUS at 05:34

## 2022-07-15 RX ADMIN — PREGABALIN 100 MG: 100 CAPSULE ORAL at 10:50

## 2022-07-15 RX ADMIN — SODIUM CHLORIDE, PRESERVATIVE FREE 10 ML: 5 INJECTION INTRAVENOUS at 21:41

## 2022-07-15 RX ADMIN — FAMOTIDINE 20 MG: 20 TABLET, FILM COATED ORAL at 21:00

## 2022-07-15 RX ADMIN — LIOTHYRONINE SODIUM 5 MCG: 5 TABLET ORAL at 15:49

## 2022-07-15 RX ADMIN — FOLIC ACID 1 MG: 1 TABLET ORAL at 10:53

## 2022-07-15 RX ADMIN — POTASSIUM BICARBONATE 40 MEQ: 782 TABLET, EFFERVESCENT ORAL at 11:12

## 2022-07-15 RX ADMIN — POTASSIUM CHLORIDE AND SODIUM CHLORIDE: 900; 150 INJECTION, SOLUTION INTRAVENOUS at 04:22

## 2022-07-15 RX ADMIN — AMLODIPINE BESYLATE 10 MG: 10 TABLET ORAL at 10:53

## 2022-07-15 RX ADMIN — FAMOTIDINE 20 MG: 20 TABLET, FILM COATED ORAL at 10:53

## 2022-07-15 RX ADMIN — ENOXAPARIN SODIUM 40 MG: 100 INJECTION SUBCUTANEOUS at 10:51

## 2022-07-15 RX ADMIN — Medication 1000 UNITS: at 10:52

## 2022-07-15 RX ADMIN — PREGABALIN 100 MG: 100 CAPSULE ORAL at 21:00

## 2022-07-15 RX ADMIN — LIOTHYRONINE SODIUM 5 MCG: 5 TABLET ORAL at 10:52

## 2022-07-15 RX ADMIN — ACETAMINOPHEN 650 MG: 650 SUPPOSITORY RECTAL at 01:30

## 2022-07-15 RX ADMIN — MORPHINE SULFATE 2 MG: 2 INJECTION, SOLUTION INTRAMUSCULAR; INTRAVENOUS at 11:13

## 2022-07-15 RX ADMIN — LIOTHYRONINE SODIUM 5 MCG: 5 TABLET ORAL at 01:30

## 2022-07-15 RX ADMIN — PREGABALIN 100 MG: 100 CAPSULE ORAL at 15:49

## 2022-07-15 ASSESSMENT — PAIN SCALES - GENERAL: PAINLEVEL_OUTOF10: 7

## 2022-07-15 ASSESSMENT — PAIN DESCRIPTION - ORIENTATION: ORIENTATION: RIGHT;LEFT;ANTERIOR;POSTERIOR

## 2022-07-15 ASSESSMENT — PAIN DESCRIPTION - DESCRIPTORS: DESCRIPTORS: ACHING

## 2022-07-15 ASSESSMENT — PAIN DESCRIPTION - LOCATION: LOCATION: BACK;GENERALIZED

## 2022-07-15 NOTE — ACP (ADVANCE CARE PLANNING)
Advance Care Planning     Advance Care Planning Inpatient Note  [facility] Department    Today's Date: 7/15/2022  Unit: STV[facility] 4B STEPDOWN    Received request from rounding. Upon review of chart and communication with care team, patient's decision making abilities are not in question. . Patient was/were present in the room during visit. Goals of ACP Conversation:  Discuss advance care planning documents    Health Care Decision Makers:    Michael Grant,  ([phone])    Summary:  Completed New Documents    Advance Care Planning Documents (Patient Wishes):  Living Will/Advance Directive  Anatomical Gift/Organ Donation     Assessment:  Patient wanted to keep  as the decision maker, as already in place through [place] law. Patient wanted to put life support wishes in writing. Patient wanted to put organ donor wishes in writing. Interventions:  Discussed and provided education on state decision maker hierarchy  Assisted in the completion of documents according to patient's wishes at this time    Care Preferences Communicated:  None    Outcomes/Plan:  New advance directive completed. Returned original document(s) to patient, as well as copies for distribution to appointed agents  Copy of advance directive given to staff to scan into medical record.     Electronically signed by Ifeanyi Bolden [address] [place] [address] Intern on 7/15/2022 at 12:29 PM

## 2022-07-15 NOTE — FLOWSHEET NOTE
Advance Care Planning     Advance Care Planning Inpatient Note  [facility] Department    Today's Date: 7/15/2022  Unit: STVZ 4B STEPDOWN    Received request from rounding. Upon review of chart and communication with care team, patient's decision making abilities are not in question. . Patient was/were present in the room during visit. Goals of ACP Conversation:  Discuss advance care planning documents    Health Care Decision Makers:    Génesis Helm,  ([phone])    Summary:  Completed New Documents    Advance Care Planning Documents (Patient Wishes):  Living Will/Advance Directive  Anatomical Gift/Organ Donation     Assessment:   engaged in active listening by learning about patient's condition, family, and coping. In this conversation, patient expressed desire to complete a living will.  said he could complete one with her if she wanted to. Patient said she did and a new living will was completed. Interventions:  Provided education on documents for clarity and greater understanding  Discussed and provided education on state decision maker hierarchy  Assisted in the completion of documents according to patient's wishes at this time    Care Preferences Communicated:   No    Outcomes/Plan:  New advance directive completed. Returned original document(s) to patient, as well as copies for distribution to appointed agents  Copy of advance directive given to staff to scan into medical record.     Electronically signed by Jo-Ann Patel on 7/15/2022 at 12:38 PM

## 2022-07-15 NOTE — PROGRESS NOTES
[facility]  Office: [phone]  Dea Queen, DO, Guilherme Velazquez, DO, Vern Bowen DO, Yanni Torres, DO, Tiffanei Smith MD, Marti Fowler MD, Matthew Miranda MD, Coco Estrada MD, Kenyon Keller MD, Raul Figueroa MD, Aline Vrenon MD, Susu Kendall DO, Kelly Alfaro MD,  Endy Freeman DO, Leodan Torres MD, Claire Rivera MD, Janeth Rowe DO, Corona Ocasio MD, Jose Larsen MD, Jose George, DO, Jj Albert MD, Gamaliel Jean-Baptiste MD, Montrell Butler, [facility], [facility], CNP, Ailene Lesches, CNP, Brittany Rosenthal, CNP, Cheli Falcon, CNP, Hamida Simpson, CNP, Oscar Gibbons, PARoderickC, Scott Zeng, DNP, Jocelynn Rene, CNP, Trace Burciaga, CNP, Mahesh Wolf, CNP, Francisco Aleman, CNS, Keith John, [facility], Yonas Hopson, CNP, Martin Ghotra, CNP, Kaleb Garcia, [address] [place] [address]    Progress Note    7/15/2022    10:49 AM    Name:   Elon Sever  MRN:     [id number]     [place]:      [de-identified]   Room:   [address] [place] [address] Day:  3  Admit Date:  7/11/2022  9:37 PM    PCP:   Maura Rosario MD  Code Status:  Full Code    Subjective:     C/C:   Chief Complaint   Patient presents with    Other     possible thyroid storm. Last TSh 191. Muscle Pain     Interval History Status: not changed. Patient reports that she is feeling better and that she has less swelling in her hands    Brief History:     Elon Sever is a 35 y.o. female with past medical history detailed below. Patient presented to the emergency department complaining of fatigue, muscle weakness/pain. Patient has a longstanding history of hypothyroidism that she has struggled with. She had a total thyroidectomy in 2019 for multinodular goiter. She is intermittently hospitalized with symptoms consistent with profound hypothyroidism. Typically her symptoms consist of fatigue and muscle weakness/pain.   She presented to the emergency department complaining of worsening fatigue, weakness, muscle pain. She takes extremely large dose of multiple thyroid hormones including 300 mcg of levothyroxine, 300 mg of [place] Thyroid, liothyronine 100 mcg daily. She reports compliance with her meds. There has been concern in the past regarding absorption. In the emergency department, she was found to have TSH over 300, thyroxine 0.12. , myoglobin 86. Potassium noted to be 3.1 on admission. Patient was admitted to the medicine service. Review of Systems:     Constitutional:  negative for chills, fevers, sweats  Respiratory:  negative for cough, dyspnea on exertion, shortness of breath, wheezing  Cardiovascular:  negative for chest pain, chest pressure/discomfort, lower extremity edema, palpitations  Gastrointestinal:  negative for abdominal pain, constipation, diarrhea, nausea, vomiting  Neurological:  negative for dizziness, headache    Medications: Allergies:     Allergies   Allergen Reactions    Doxycycline Anaphylaxis       Current Meds:   Scheduled Meds:    levothyroxine  200 mcg IntraVENous Daily    And    sodium chloride (PF)  5 mL IntraVENous Daily    liothyronine  5 mcg Oral q8h    amLODIPine  10 mg Oral Daily    famotidine  20 mg Oral BID    folic acid  1 mg Oral Daily    pregabalin  100 mg Oral TID    [Held by provider] thyroid  300 mg Oral Daily    Vitamin D  1,000 Units Oral Daily    [Held by provider] levothyroxine  200 mcg Oral Daily    sodium chloride flush  5-40 mL IntraVENous 2 times per day    enoxaparin  40 mg SubCUTAneous Daily    potassium bicarb-citric acid  40 mEq Oral Daily     Continuous Infusions:    sodium chloride Stopped (07/12/22 0734)    0.9% NaCl with KCl 20 mEq 100 mL/hr at 07/15/22 0422     PRN Meds: cyclobenzaprine, sodium chloride flush, ondansetron **OR** ondansetron, polyethylene glycol, acetaminophen **OR** acetaminophen, potassium chloride **OR** potassium alternative oral replacement **OR** potassium Examination:        General appearance:  alert, cooperative and no distress  Mental Status:  oriented to person, place and time and normal affect  Lungs:  clear to auscultation bilaterally, normal effort  Heart:  regular rate and rhythm, no murmur  Abdomen:  soft, nontender, nondistended, normal bowel sounds, no masses, hepatomegaly, splenomegaly  Extremities:  Edematous, taut skin in lower extremities  Skin:  no gross lesions, rashes, induration    Assessment:        Hospital Problems             Last Modified POA    * (Principal) Thyroid myopathy 7/12/2022 Yes    Myxedema 7/12/2022 Yes    Primary hypertension 7/12/2022 Yes    Severe hypothyroidism 7/12/2022 Yes    Obesity (BMI 30-39. 9) 7/12/2022 Yes    H/O total thyroidectomy 7/12/2022 Yes    Non-traumatic rhabdomyolysis 7/12/2022 Yes       Plan:        Severe hypothyroidism status post remote thyroidectomy TSH peaked at around 300 this morning to 55  Continue patient on 200 mcg of Synthroid  Change dosing frequency of T3 yesterday  Discontinue steroids  Watch for hypertension  \No lab disturbances otherwise  CK marginally elevated at 293  Still working to increase levels of T4 and T3 patient still awaiting bed at [address] MD  7/15/2022  10:49 AM

## 2022-07-16 LAB
MYOGLOBIN: 32 NG/ML (ref 25–58)
T3 FREE: 1.52 PG/ML (ref 2.02–4.43)
THYROXINE, FREE: 0.8 NG/DL (ref 0.93–1.7)
TSH SERPL DL<=0.05 MIU/L-ACNC: 416.4 UIU/ML (ref 0.3–5)

## 2022-07-16 PROCEDURE — 6360000002 HC RX W HCPCS: Performed by: PHYSICIAN ASSISTANT

## 2022-07-16 PROCEDURE — 84443 ASSAY THYROID STIM HORMONE: CPT

## 2022-07-16 PROCEDURE — 6370000000 HC RX 637 (ALT 250 FOR IP): Performed by: STUDENT IN AN ORGANIZED HEALTH CARE EDUCATION/TRAINING PROGRAM

## 2022-07-16 PROCEDURE — 84481 FREE ASSAY (FT-3): CPT

## 2022-07-16 PROCEDURE — 36415 COLL VENOUS BLD VENIPUNCTURE: CPT

## 2022-07-16 PROCEDURE — 6370000000 HC RX 637 (ALT 250 FOR IP): Performed by: PHYSICIAN ASSISTANT

## 2022-07-16 PROCEDURE — 84439 ASSAY OF FREE THYROXINE: CPT

## 2022-07-16 PROCEDURE — 2500000003 HC RX 250 WO HCPCS: Performed by: PHYSICIAN ASSISTANT

## 2022-07-16 PROCEDURE — 83874 ASSAY OF MYOGLOBIN: CPT

## 2022-07-16 PROCEDURE — 2580000003 HC RX 258: Performed by: PHYSICIAN ASSISTANT

## 2022-07-16 PROCEDURE — 2060000000 HC ICU INTERMEDIATE R&B

## 2022-07-16 PROCEDURE — 6370000000 HC RX 637 (ALT 250 FOR IP): Performed by: NURSE PRACTITIONER

## 2022-07-16 PROCEDURE — 6360000002 HC RX W HCPCS: Performed by: NURSE PRACTITIONER

## 2022-07-16 PROCEDURE — 99232 SBSQ HOSP IP/OBS MODERATE 35: CPT | Performed by: STUDENT IN AN ORGANIZED HEALTH CARE EDUCATION/TRAINING PROGRAM

## 2022-07-16 PROCEDURE — 2580000003 HC RX 258: Performed by: NURSE PRACTITIONER

## 2022-07-16 RX ADMIN — MORPHINE SULFATE 2 MG: 2 INJECTION, SOLUTION INTRAMUSCULAR; INTRAVENOUS at 00:18

## 2022-07-16 RX ADMIN — MORPHINE SULFATE 2 MG: 2 INJECTION, SOLUTION INTRAMUSCULAR; INTRAVENOUS at 05:59

## 2022-07-16 RX ADMIN — SODIUM CHLORIDE, PRESERVATIVE FREE 10 ML: 5 INJECTION INTRAVENOUS at 22:52

## 2022-07-16 RX ADMIN — AMLODIPINE BESYLATE 10 MG: 10 TABLET ORAL at 08:46

## 2022-07-16 RX ADMIN — PREGABALIN 100 MG: 100 CAPSULE ORAL at 21:00

## 2022-07-16 RX ADMIN — SODIUM CHLORIDE, PRESERVATIVE FREE 10 ML: 5 INJECTION INTRAVENOUS at 08:47

## 2022-07-16 RX ADMIN — MORPHINE SULFATE 2 MG: 2 INJECTION, SOLUTION INTRAMUSCULAR; INTRAVENOUS at 17:08

## 2022-07-16 RX ADMIN — ONDANSETRON 4 MG: 2 INJECTION INTRAMUSCULAR; INTRAVENOUS at 22:53

## 2022-07-16 RX ADMIN — FAMOTIDINE 20 MG: 20 TABLET, FILM COATED ORAL at 22:52

## 2022-07-16 RX ADMIN — PREGABALIN 100 MG: 100 CAPSULE ORAL at 08:46

## 2022-07-16 RX ADMIN — LEVOTHYROXINE SODIUM ANHYDROUS 200 MCG: 100 INJECTION, POWDER, LYOPHILIZED, FOR SOLUTION INTRAVENOUS at 05:54

## 2022-07-16 RX ADMIN — LIOTHYRONINE SODIUM 5 MCG: 5 TABLET ORAL at 00:18

## 2022-07-16 RX ADMIN — SODIUM CHLORIDE, PRESERVATIVE FREE 5 ML: 5 INJECTION INTRAVENOUS at 08:46

## 2022-07-16 RX ADMIN — FOLIC ACID 1 MG: 1 TABLET ORAL at 08:46

## 2022-07-16 RX ADMIN — FAMOTIDINE 20 MG: 20 TABLET, FILM COATED ORAL at 08:46

## 2022-07-16 RX ADMIN — LIOTHYRONINE SODIUM 5 MCG: 5 TABLET ORAL at 08:46

## 2022-07-16 RX ADMIN — POTASSIUM CHLORIDE AND SODIUM CHLORIDE: 900; 150 INJECTION, SOLUTION INTRAVENOUS at 06:05

## 2022-07-16 RX ADMIN — MORPHINE SULFATE 2 MG: 2 INJECTION, SOLUTION INTRAMUSCULAR; INTRAVENOUS at 22:53

## 2022-07-16 RX ADMIN — ONDANSETRON 4 MG: 2 INJECTION INTRAMUSCULAR; INTRAVENOUS at 17:07

## 2022-07-16 RX ADMIN — Medication 1000 UNITS: at 08:46

## 2022-07-16 ASSESSMENT — PAIN DESCRIPTION - LOCATION
LOCATION: ABDOMEN
LOCATION: GENERALIZED
LOCATION: LEG
LOCATION: BACK
LOCATION: ABDOMEN

## 2022-07-16 ASSESSMENT — PAIN SCALES - GENERAL
PAINLEVEL_OUTOF10: 6
PAINLEVEL_OUTOF10: 7
PAINLEVEL_OUTOF10: 7
PAINLEVEL_OUTOF10: 6
PAINLEVEL_OUTOF10: 3

## 2022-07-16 ASSESSMENT — PAIN DESCRIPTION - ORIENTATION
ORIENTATION: RIGHT;LEFT
ORIENTATION: MID
ORIENTATION: MID

## 2022-07-16 ASSESSMENT — PAIN DESCRIPTION - DESCRIPTORS: DESCRIPTORS: ACHING;CRAMPING

## 2022-07-16 NOTE — PROGRESS NOTES
Patient is vomiting with abdominal cramping, hard abdomen, and in a lot of pain. Pain assessment as charted. She was tachycardic  and felt like her heart was beating out of her chest.  Medicated with zofran and morphine. Patient lying quietly in the dark.

## 2022-07-16 NOTE — PLAN OF CARE
Problem: Pain  Goal: Verbalizes/displays adequate comfort level or baseline comfort level  7/16/2022 1756 by Rogers Ambrose RN  Outcome: Not Progressing  7/16/2022 1755 by Rogers Ambrose RN  Outcome: Not Progressing  Flowsheets (Taken 7/16/2022 0800)  Verbalizes/displays adequate comfort level or baseline comfort level:   Encourage patient to monitor pain and request assistance   Assess pain using appropriate pain scale   Administer analgesics based on type and severity of pain and evaluate response   Implement non-pharmacological measures as appropriate and evaluate response   Consider cultural and social influences on pain and pain management   Notify Licensed Independent Practitioner if interventions unsuccessful or patient reports new pain     Problem: Discharge Planning  Goal: Discharge to home or other facility with appropriate resources  7/16/2022 1756 by Rogers Ambrose RN  Outcome: Progressing  7/16/2022 1755 by Rogers Ambrose RN  Outcome: Progressing  Flowsheets  Taken 7/16/2022 0800  Discharge to home or other facility with appropriate resources:   Identify barriers to discharge with patient and caregiver   Arrange for needed discharge resources and transportation as appropriate   Identify discharge learning needs (meds, wound care, etc)   Refer to discharge planning if patient needs post-hospital services based on physician order or complex needs related to functional status, cognitive ability or social support system  Taken 7/16/2022 0750  Discharge to home or other facility with appropriate resources:   Identify barriers to discharge with patient and caregiver   Arrange for needed discharge resources and transportation as appropriate   Arrange for interpreters to assist at discharge as needed   Refer to discharge planning if patient needs post-hospital services based on physician order or complex needs related to functional status, cognitive ability or social support system     Problem: Safety - Adult  Goal: Free from fall injury  7/16/2022 1756 by Rogers Ambrose RN  Outcome: Progressing  7/16/2022 1755 by Rogers Ambrose RN  Outcome: Progressing  Flowsheets (Taken 7/16/2022 0800)  Free From Fall Injury: Instruct family/caregiver on patient safety   Patient was up most of day. Going down to smoke without issue. Late afternoon, patient became nauseous and had pain. Patient medicated per MAR. Dr. Keith Bolton notified. Plan of care continues as above.

## 2022-07-16 NOTE — DISCHARGE INSTRUCTIONS
Patient to follow up with Dr. Juaquin Ramirez on Monday July 18, 2022  Be sure to keep all appointments

## 2022-07-16 NOTE — PROGRESS NOTES
[facility]  Office: [phone]  Jolie Quevedo DO, Jose Townsend DO, Dinesh Hennessy, DO, David Torres, DO, Deon Ordonez MD, Conor Guerrero MD, Lesli Lagunas MD, Ashley Silverio MD, Serafin Bauer MD, Giulia Shen MD, Rupa Munoz MD, Vasiliy Jeff DO, Anton Callaway MD,  Mima Gaffney DO, Jerilyn Sharpe MD, Jerry Hobson MD, Bekah Kapadia DO, Ben Pina MD, Jeniffer Ahn MD, Herberth Olmstead DO, Steph Parrish MD, Roman Kline MD, Kevin Triana, [facility], [facility], CNP, Margi Ross, CNP, Mehdi Musa, CNP, Kari Her, CNP, Radha Granados, CNP, Adry Lobo PA-C, Charmel Osgood, [facility], Inez Waggoner, [facility], Jaja Gaffney, CNP, Lzaara Raymond, CNP, Chelsi Kamara, CNS, Kathya Cartagena, [facility], Telly Weinstein, CNP, Ge Low, CNP, Navin Duarte, [address] [place] [address]    Progress Note    7/16/2022    9:09 AM    Name:   Ariadne Perez  MRN:     [id number]     [place]:      [de-identified]   Room:   [address] [place] [address] Day:  4  Admit Date:  7/11/2022  9:37 PM    PCP:   Lety Scott MD  Code Status:  Full Code    Subjective:     C/C:   Chief Complaint   Patient presents with    Other     possible thyroid storm. Last TSh 191. Muscle Pain     Interval History Status: not changed. Patient seen she was sitting upright in her bed eating breakfast felt great no complaints eager to be discharged    Brief History:     Ariadne Perez is a 35 y.o. female with past medical history detailed below. Patient presented to the emergency department complaining of fatigue, muscle weakness/pain. Patient has a longstanding history of hypothyroidism that she has struggled with. She had a total thyroidectomy in 2019 for multinodular goiter. She is intermittently hospitalized with symptoms consistent with profound hypothyroidism. Typically her symptoms consist of fatigue and muscle weakness/pain.   She presented to the emergency department complaining of worsening fatigue, weakness, muscle pain. She takes extremely large dose of multiple thyroid hormones including 300 mcg of levothyroxine, 300 mg of [place] Thyroid, liothyronine 100 mcg daily. She reports compliance with her meds. There has been concern in the past regarding absorption. In the emergency department, she was found to have TSH over 300, thyroxine 0.12. , myoglobin 86. Potassium noted to be 3.1 on admission. Patient was admitted to the medicine service. Review of Systems:     Constitutional:  negative for chills, fevers, sweats  Respiratory:  negative for cough, dyspnea on exertion, shortness of breath, wheezing  Cardiovascular:  negative for chest pain, chest pressure/discomfort, lower extremity edema, palpitations  Gastrointestinal:  negative for abdominal pain, constipation, diarrhea, nausea, vomiting  Neurological:  negative for dizziness, headache    Medications: Allergies:     Allergies   Allergen Reactions    Doxycycline Anaphylaxis       Current Meds:   Scheduled Meds:    levothyroxine  200 mcg IntraVENous Daily    And    sodium chloride (PF)  5 mL IntraVENous Daily    liothyronine  5 mcg Oral q8h    amLODIPine  10 mg Oral Daily    famotidine  20 mg Oral BID    folic acid  1 mg Oral Daily    pregabalin  100 mg Oral TID    [Held by provider] thyroid  300 mg Oral Daily    Vitamin D  1,000 Units Oral Daily    [Held by provider] levothyroxine  200 mcg Oral Daily    sodium chloride flush  5-40 mL IntraVENous 2 times per day    enoxaparin  40 mg SubCUTAneous Daily    potassium bicarb-citric acid  40 mEq Oral Daily     Continuous Infusions:    sodium chloride Stopped (07/12/22 1134)    0.9% NaCl with KCl 20 mEq 100 mL/hr at 07/16/22 0605     PRN Meds: cyclobenzaprine, sodium chloride flush, ondansetron **OR** ondansetron, polyethylene glycol, acetaminophen **OR** acetaminophen, potassium chloride **OR** potassium alternative oral replacement **OR** potassium chloride, morphine    Data:     Past Medical History:   has a past medical history of Carpal tunnel syndrome, Enlarged thyroid, H/O D&C (), H/O LEEP (), H/O SAB x 4, Incomplete miscarriage, Miscarriage, Psychiatric problem, Thyroid disease, and Thyroiditis. Social History:   reports that she has been smoking cigarettes. She has a 11.50 pack-year smoking history. She has never used smokeless tobacco. She reports that she does not drink alcohol and does not use drugs. Family History:   Family History   Problem Relation Age of Onset    Diabetes Mother     Mental Illness Mother     Substance Abuse Father        Vitals:  /84   Pulse 72   Temp 97.9 °F (36.6 °C) (Oral)   Resp 16   Wt 203 lb (92.1 kg)   SpO2 92%   BMI 34.84 kg/m²   Temp (24hrs), Av.9 °F (36.6 °C), Min:97.6 °F (36.4 °C), Max:98.1 °F (36.7 °C)    No results for input(s): POCGLU in the last 72 hours. I/O (24Hr): No intake or output data in the 24 hours ending 22 0909    Labs:  Hematology:  No results for input(s): WBC, RBC, HGB, HCT, MCV, MCH, MCHC, RDW, PLT, MPV, SEDRATE, CRP, INR, DDIMER, YI4TDORO, LABABSO in the last 72 hours. Invalid input(s): PT    Chemistry:  Recent Labs     22  0747 07/15/22  0425 22  0643    140  --    K 4.5 3.9  --     103  --    CO2 27 26  --    GLUCOSE 118* 109*  --    BUN 9 9  --    CREATININE 0.93* 0.85  --    ANIONGAP 10 11  --    LABGLOM >60 >60  --    GFRAA >60 >60  --    CALCIUM 8.8 8.8  --    CKTOTAL  --  293*  --    MYOGLOBIN 40 33 32     Recent Labs     22  0905   .80*     ABG:  Lab Results   Component Value Date/Time    FIO2 INFORMATION NOT PROVIDED 2021 06:38 PM     Lab Results   Component Value Date/Time    SPECIAL R HAND 20ML 2022 09:37 AM     Lab Results   Component Value Date/Time    CULTURE NO SIGNIFICANT GROWTH 2022 02:45 PM       Radiology:  No results found.     Physical Examination:        General appearance:  alert, cooperative and no distress  Mental Status:  oriented to person, place and time and normal affect  Lungs:  clear to auscultation bilaterally, normal effort  Heart:  regular rate and rhythm, no murmur  Abdomen:  soft, nontender, nondistended, normal bowel sounds, no masses, hepatomegaly, splenomegaly  Extremities:  Edematous, taut skin in lower extremities  Skin:  no gross lesions, rashes, induration    Assessment:        Hospital Problems             Last Modified POA    * (Principal) Thyroid myopathy 7/12/2022 Yes    Myxedema 7/12/2022 Yes    Primary hypertension 7/12/2022 Yes    Severe hypothyroidism 7/12/2022 Yes    Obesity (BMI 30-39. 9) 7/12/2022 Yes    H/O total thyroidectomy 7/12/2022 Yes    Non-traumatic rhabdomyolysis 7/12/2022 Yes       Plan:        Myxedema coma like picture  Was on very high dose of Synthroid thyroid [place] add Cytomel  Sees endocrinology upcoming appointment on Monday  Plan was to have a port placed  Cannot reach Dr. Cary Kong on weekend or in general as he is not on perfect serve [facility] [employer] clinic transfer pending although it may take significant amount of time  Patient's is feeling much better today and yesterday and is amenable to going home  She is likely deriving benefit from receiving medications through IV, but since she has follow-up with Dr. Rey Pavon on Monday I think it is appropriate to discharge on current medications with close follow-up    At this point there is obvious concern that she has malabsorption related to thyroid dosing      Doris Camacho MD  7/16/2022  9:09 AM

## 2022-07-17 VITALS
DIASTOLIC BLOOD PRESSURE: 91 MMHG | WEIGHT: 203 LBS | RESPIRATION RATE: 18 BRPM | OXYGEN SATURATION: 97 % | BODY MASS INDEX: 34.66 KG/M2 | SYSTOLIC BLOOD PRESSURE: 133 MMHG | HEIGHT: 64 IN | HEART RATE: 90 BPM | TEMPERATURE: 98.2 F

## 2022-07-17 LAB — MYOGLOBIN: 80 NG/ML (ref 25–58)

## 2022-07-17 PROCEDURE — 2580000003 HC RX 258: Performed by: NURSE PRACTITIONER

## 2022-07-17 PROCEDURE — 6370000000 HC RX 637 (ALT 250 FOR IP)

## 2022-07-17 PROCEDURE — 36415 COLL VENOUS BLD VENIPUNCTURE: CPT

## 2022-07-17 PROCEDURE — 6370000000 HC RX 637 (ALT 250 FOR IP): Performed by: PHYSICIAN ASSISTANT

## 2022-07-17 PROCEDURE — 2580000003 HC RX 258: Performed by: PHYSICIAN ASSISTANT

## 2022-07-17 PROCEDURE — 6360000002 HC RX W HCPCS: Performed by: PHYSICIAN ASSISTANT

## 2022-07-17 PROCEDURE — 2500000003 HC RX 250 WO HCPCS: Performed by: PHYSICIAN ASSISTANT

## 2022-07-17 PROCEDURE — 83874 ASSAY OF MYOGLOBIN: CPT

## 2022-07-17 PROCEDURE — 99232 SBSQ HOSP IP/OBS MODERATE 35: CPT | Performed by: STUDENT IN AN ORGANIZED HEALTH CARE EDUCATION/TRAINING PROGRAM

## 2022-07-17 PROCEDURE — 6370000000 HC RX 637 (ALT 250 FOR IP): Performed by: STUDENT IN AN ORGANIZED HEALTH CARE EDUCATION/TRAINING PROGRAM

## 2022-07-17 PROCEDURE — 6370000000 HC RX 637 (ALT 250 FOR IP): Performed by: NURSE PRACTITIONER

## 2022-07-17 RX ORDER — ONDANSETRON 4 MG/1
4 TABLET, ORALLY DISINTEGRATING ORAL 3 TIMES DAILY PRN
Qty: 21 TABLET | Refills: 0 | Status: SHIPPED | OUTPATIENT
Start: 2022-07-17

## 2022-07-17 RX ORDER — LIOTHYRONINE SODIUM 5 UG/1
10 TABLET ORAL EVERY 8 HOURS
Status: DISCONTINUED | OUTPATIENT
Start: 2022-07-17 | End: 2022-07-17 | Stop reason: HOSPADM

## 2022-07-17 RX ADMIN — POTASSIUM BICARBONATE 40 MEQ: 782 TABLET, EFFERVESCENT ORAL at 08:00

## 2022-07-17 RX ADMIN — LIOTHYRONINE SODIUM 10 MCG: 5 TABLET ORAL at 08:01

## 2022-07-17 RX ADMIN — LEVOTHYROXINE SODIUM ANHYDROUS 200 MCG: 100 INJECTION, POWDER, LYOPHILIZED, FOR SOLUTION INTRAVENOUS at 08:00

## 2022-07-17 RX ADMIN — Medication 1000 UNITS: at 08:01

## 2022-07-17 RX ADMIN — FAMOTIDINE 20 MG: 20 TABLET, FILM COATED ORAL at 08:10

## 2022-07-17 RX ADMIN — SODIUM CHLORIDE, PRESERVATIVE FREE 5 ML: 5 INJECTION INTRAVENOUS at 08:05

## 2022-07-17 RX ADMIN — SODIUM CHLORIDE, PRESERVATIVE FREE 10 ML: 5 INJECTION INTRAVENOUS at 08:05

## 2022-07-17 RX ADMIN — POTASSIUM CHLORIDE AND SODIUM CHLORIDE: 900; 150 INJECTION, SOLUTION INTRAVENOUS at 08:16

## 2022-07-17 RX ADMIN — PREGABALIN 100 MG: 100 CAPSULE ORAL at 08:01

## 2022-07-17 RX ADMIN — FOLIC ACID 1 MG: 1 TABLET ORAL at 08:01

## 2022-07-17 RX ADMIN — AMLODIPINE BESYLATE 10 MG: 10 TABLET ORAL at 08:01

## 2022-07-17 NOTE — PROGRESS NOTES
Patient discharged to home with plan to go to [facility] in private transport. Patient has appointment with Dr. Vandana Rehman tomorrow morning, but patient believes she will be admitted to [facility] today. Assessment remains unchanged. Patient lungs clear. O2 sat up to 97% on Room Air. Educated on smoking cessation. Patient verbalized understanding. Patient IV removed without incident. Patient walked to exit with all personal belongings.

## 2022-07-17 NOTE — DISCHARGE SUMMARY
[facility]  Office: [address], DO, Edwina Jessica, DO, Bock Mail, DO, Jes Evans Blood, DO, Vinay Gillespie MD, Ana Lu MD, Cedric William MD, Fabienne Koo MD, Adis Moreno MD, Talia Crawford MD, Mary Proctor MD, Elroy Vivar, DO, Ad Smith MD,  Juliane Zaragoza, DO, Carmella Loya MD, Cesar Knutson MD, Sameera Napoles, DO, Niya Diaz MD, Louise Bass MD, Gurwinder Blevins, DO, Candido Sosa MD, Mita Palomares MD, Unique Adkins, [facility], [facility], CNP, Luz Maria Martinez, CNP, Cris Raw, CNP, Jenniffer Squires, CNP, Colleen Tripathi, CNP, Lauren Begum PA-C, Ariadne Peña, [facility], Blanco Kuhn, CNP, Jessica Waterman, CNP, Timur Rizo, CNP, Ishan Cormier, CNS, Rod Vega, [facility], Emerson Carver, CNP, Audi Sandoval, [facility], Jinx Pin, [address] [place] [address]    Discharge Summary     Patient ID: Ayo English  :  1980   MRN: [id number]     ACCOUNT:  [de-identified]   Patient's PCP: Hector Garnica MD  Admit Date: 2022   Discharge Date: 2022     Length of Stay: 5  Code Status:  Full Code  Admitting Physician: No admitting provider for patient encounter. Discharge Physician: Louise Bass MD     Active Discharge Diagnoses:     Hospital Problem Lists:  Principal Problem:    Thyroid myopathy  Active Problems:    Myxedema    Primary hypertension    Severe hypothyroidism    Obesity (BMI 30-39. 9)    H/O total thyroidectomy    Non-traumatic rhabdomyolysis  Resolved Problems:    * No resolved hospital problems.  *      Admission Condition:  fair     Discharged Condition: fair    Hospital Stay:     Hospital Course:  Ayo English is a 35 y.o. female who was admitted for the management of   Thyroid myopathy , presented to ER with TSH elevated concern for myxedema coma although clinically stable and not consistent with myxedema coma    Pt admitted with severe derangement in TSH T4 T3  She was on high dose synthroid, thyroid armour, and cytomel from her endocrinologist Dr. Guillermo Beck. His most recent thought was that she was having difficulty with oral absorption and wanted her to get a port for IV infusion    She was scheduled to be transferred to [address] [place] [address] however they have had no beds all week, and she has been lingering. We had thoughtful discussion about her plan of care, and since we do not have endocrinology at this hospital we decided to have her DC with  to go to  and be assessed in ER since all lab work already done    She was asymptomatic day of DC  No myopathy, and throughout her stay she has not been stuporous or altered. On her med rec, I DC her thyroid meds as they have clearly not been helping. She assures me that she HAS been taking all of her meds as prescribed. Significant therapeutic interventions:     Significant Diagnostic Studies:   Labs / Micro:  TSH:    Lab Results   Component Value Date/Time    .40 07/16/2022 06:43 AM        Radiology:  No results found. Consultations:    Consults:     Final Specialist Recommendations/Findings:   IP CONSULT TO HOSPITALIST  IP CONSULT TO IV TEAM  IP CONSULT TO ENDOCRINOLOGY      The patient was seen and examined on day of discharge and this discharge summary is in conjunction with any daily progress note from day of discharge.     Discharge plan:     Disposition: driven by  to [address] [place] [address] TODAY    Physician Follow Up:     Jennine Fabry  [address]  [facility] [address]    Follow up       Requiring Further Evaluation/Follow Up POST HOSPITALIZATION/Incidental Findings:     Diet: regular diet    Activity: As tolerated    Instructions to Patient: go to [address] [place] [address] ED for assessment    Discharge Medications:      Medication List        START taking these medications      * ondansetron 4 MG disintegrating tablet  Commonly known as: ZOFRAN-ODT  Take 1 tablet by mouth 3 times daily as needed for Nausea or Vomiting     * ondansetron 4 MG disintegrating tablet  Commonly known as: ZOFRAN-ODT  Take 1 tablet by mouth 3 times daily as needed for Nausea or Vomiting           * This list has 2 medication(s) that are the same as other medications prescribed for you. Read the directions carefully, and ask your doctor or other care provider to review them with you. CONTINUE taking these medications      famotidine 20 MG tablet  Commonly known as: PEPCID  Take 1 tablet by mouth 2 times daily for 14 days     folic acid 1 MG tablet  Commonly known as: FOLVITE  Take 1 tablet by mouth daily     levothyroxine 200 MCG tablet  Commonly known as: SYNTHROID  Take 1 tablet by mouth Daily     liothyronine 50 MCG tablet  Commonly known as: CYTOMEL  Take 1 tablet by mouth daily     pregabalin 100 MG capsule  Commonly known as: LYRICA     thyroid 300 MG tablet  Commonly known as: [place] Thyroid  Take 1 tablet by mouth daily     Vitamin D 25 MCG (1000 UT) Tabs tablet  Commonly known as: CHOLECALCIFEROL  Take 1 tablet by mouth daily            STOP taking these medications      cholestyramine light 4 g packet     ibuprofen 600 MG tablet  Commonly known as: ADVIL;MOTRIN               Where to Get Your Medications        These medications were sent to [place] [address], [address] [address] [address]      Phone: [phone]   ondansetron 4 MG disintegrating tablet       These medications were sent to [place] [address], [address] [facility] [phone]  [address] [place] [address] Place      Phone: [phone]   ondansetron 4 MG disintegrating tablet         No discharge procedures on file.     Time Spent on discharge is  32 mins in patient examination, evaluation, counseling as well as medication reconciliation, prescriptions for required medications, discharge plan and follow up.    Electronically signed by   Mima Vivar MD  7/17/2022  11:27 AM      Thank you Dr. Bowen Steele MD for the opportunity to be involved in this patient's care.

## 2022-07-17 NOTE — PROGRESS NOTES
[facility]  Office: [phone]  Petros Whitt DO, Fernanda Lazcano DO, Michelle Almaraz DO, Ct Torres DO, Jamie Donaldson MD, Anastacio Gandhi MD, Mariposa Dawson MD, Jose Eduardo Boggs MD, Lynn Wang MD, Vic Cabrera MD, Kelly Guaman MD, Martina Salgado DO, Bernarda Sanderson MD,  Zoya Vo DO, Graciela Vickers MD, Ramona Broderick MD, Olivia Olsen DO, Kalyani Morin MD, Dylon Blankenship MD, Nae Islas DO, Chavo Box MD, Kelly Robbins MD, Fernanda Reyes, [facility], [facility] Bharathdanny, CNP, Willi Carter, CNP, Janeth Kaminski, CNP, Wilton Leblanc, CNP, [place] Kong, CNP, Kenn Albert PA-C, Moises Kim, DNP, Eric Hatfield, CNP, Shoaib Light, CNP, Jack Staton, CNP, Lucero Barajas, CNS, Camilo Ramsay, [facility], Wendy Tavares, CNP, Cheri Tristan, CNP, [facility], [address] [place] [address]    Progress Note    7/17/2022    9:41 AM    Name:   Rony Nicole  MRN:     [id number]     [place]:      [de-identified]   Room:   [address] [place] [address] Day:  5  Admit Date:  7/11/2022  9:37 PM    PCP:   Ivett Vasquez MD  Code Status:  Full Code    Subjective:     C/C:   Chief Complaint   Patient presents with    Other     possible thyroid storm. Last TSh 191. Muscle Pain     Interval History Status: not changed. Patient seen she was sitting upright in her bed eating breakfast felt great no complaints eager to be discharged    Brief History:     Rony Nicole is a 35 y.o. female with past medical history detailed below. Patient presented to the emergency department complaining of fatigue, muscle weakness/pain. Patient has a longstanding history of hypothyroidism that she has struggled with. She had a total thyroidectomy in 2019 for multinodular goiter. She is intermittently hospitalized with symptoms consistent with profound hypothyroidism. Typically her symptoms consist of fatigue and muscle weakness/pain.   She presented to the emergency department complaining of worsening fatigue, weakness, muscle pain. She takes extremely large dose of multiple thyroid hormones including 300 mcg of levothyroxine, 300 mg of [place] Thyroid, liothyronine 100 mcg daily. She reports compliance with her meds. There has been concern in the past regarding absorption. In the emergency department, she was found to have TSH over 300, thyroxine 0.12. , myoglobin 86. Potassium noted to be 3.1 on admission. Patient was admitted to the medicine service. Review of Systems:     Constitutional:  negative for chills, fevers, sweats  Respiratory:  negative for cough, dyspnea on exertion, shortness of breath, wheezing  Cardiovascular:  negative for chest pain, chest pressure/discomfort, lower extremity edema, palpitations  Gastrointestinal:  negative for abdominal pain, constipation, diarrhea, nausea, vomiting  Neurological:  negative for dizziness, headache    Medications: Allergies:     Allergies   Allergen Reactions    Doxycycline Anaphylaxis       Current Meds:   Scheduled Meds:    liothyronine  10 mcg Oral q8h    levothyroxine  200 mcg IntraVENous Daily    And    sodium chloride (PF)  5 mL IntraVENous Daily    amLODIPine  10 mg Oral Daily    famotidine  20 mg Oral BID    folic acid  1 mg Oral Daily    pregabalin  100 mg Oral TID    [Held by provider] thyroid  300 mg Oral Daily    Vitamin D  1,000 Units Oral Daily    [Held by provider] levothyroxine  200 mcg Oral Daily    sodium chloride flush  5-40 mL IntraVENous 2 times per day    enoxaparin  40 mg SubCUTAneous Daily    potassium bicarb-citric acid  40 mEq Oral Daily     Continuous Infusions:    sodium chloride Stopped (07/12/22 1134)    0.9% NaCl with KCl 20 mEq 100 mL/hr at 07/17/22 0816     PRN Meds: cyclobenzaprine, sodium chloride flush, ondansetron **OR** ondansetron, polyethylene glycol, acetaminophen **OR** acetaminophen, potassium chloride **OR** potassium alternative oral replacement **OR** potassium chloride, morphine    Data:     Past Medical History:   has a past medical history of Carpal tunnel syndrome, Enlarged thyroid, H/O D&C (), H/O LEEP (), H/O SAB x 4, Incomplete miscarriage, Miscarriage, Psychiatric problem, Thyroid disease, and Thyroiditis. Social History:   reports that she has been smoking cigarettes. She has a 11.50 pack-year smoking history. She has never used smokeless tobacco. She reports that she does not drink alcohol and does not use drugs. Family History:   Family History   Problem Relation Age of Onset    Diabetes Mother     Mental Illness Mother     Substance Abuse Father        Vitals:  BP (!) 133/91   Pulse 87   Temp 98.2 °F (36.8 °C) (Oral)   Resp 17   Ht 5' 4\" (1.626 m)   Wt 203 lb (92.1 kg)   SpO2 97%   BMI 34.84 kg/m²   Temp (24hrs), Av °F (36.7 °C), Min:97.6 °F (36.4 °C), Max:98.5 °F (36.9 °C)    No results for input(s): POCGLU in the last 72 hours. I/O (24Hr): No intake or output data in the 24 hours ending 22 0941    Labs:  Hematology:  No results for input(s): WBC, RBC, HGB, HCT, MCV, MCH, MCHC, RDW, PLT, MPV, SEDRATE, CRP, INR, DDIMER, AY1KNKAY, LABABSO in the last 72 hours.     Invalid input(s): PT    Chemistry:  Recent Labs     07/15/22  0425 22  0643 22  0520     --   --    K 3.9  --   --      --   --    CO2 26  --   --    GLUCOSE 109*  --   --    BUN 9  --   --    CREATININE 0.85  --   --    ANIONGAP 11  --   --    LABGLOM >60  --   --    GFRAA >60  --   --    CALCIUM 8.8  --   --    CKTOTAL 293*  --   --    MYOGLOBIN 33 32 80*     Recent Labs     22  0643   .40*     ABG:  Lab Results   Component Value Date/Time    FIO2 INFORMATION NOT PROVIDED 2021 06:38 PM     Lab Results   Component Value Date/Time    SPECIAL R HAND 20ML 2022 09:37 AM     Lab Results   Component Value Date/Time    CULTURE NO SIGNIFICANT GROWTH 2022 02:45 PM       Radiology:  No results found.    Physical Examination:        General appearance:  alert, cooperative and no distress  Mental Status:  oriented to person, place and time and normal affect  Lungs:  clear to auscultation bilaterally, normal effort  Heart:  regular rate and rhythm, no murmur  Abdomen:  soft, nontender, nondistended, normal bowel sounds, no masses, hepatomegaly, splenomegaly  Extremities:  Edematous, taut skin in lower extremities  Skin:  no gross lesions, rashes, induration    Assessment:        Hospital Problems             Last Modified POA    * (Principal) Thyroid myopathy 7/12/2022 Yes    Myxedema 7/12/2022 Yes    Primary hypertension 7/12/2022 Yes    Severe hypothyroidism 7/12/2022 Yes    Obesity (BMI 30-39. 9) 7/12/2022 Yes    H/O total thyroidectomy 7/12/2022 Yes    Non-traumatic rhabdomyolysis 7/12/2022 Yes       Plan:        Myxedema coma like picture  Was on very high dose of Synthroid thyroid [place] add Cytomel  Sees endocrinology upcoming appointment on Monday  Plan was to have a port placed  Cannot reach Dr. Allen Mcgraw on weekend or in general as he is not on perfect serve [facility] [place] clinic transfer pending although it may take significant amount of time  Patient's is feeling much better today and yesterday and is amenable to going home  She is likely deriving benefit from receiving medications through IV, but since she has follow-up with Dr. Bonita Gallardo on Monday I think it is appropriate to discharge on current medications with close follow-up    At this point there is obvious concern that she has malabsorption related to thyroid dosing    Will dc today and her  will drive her to [facility]  TSH is trending up, however T4 T3 improving  Has been given zofran for nausea      Irene Mckinley MD  7/17/2022  9:41 AM

## 2022-07-17 NOTE — PLAN OF CARE
Problem: Discharge Planning  Goal: Discharge to home or other facility with appropriate resources  7/16/2022 2026 by Isabelle Levin RN  Outcome: Progressing  7/16/2022 1756 by Chapito Duong RN  Outcome: Progressing  7/16/2022 1755 by Chapito Duong RN  Outcome: Progressing  Flowsheets  Taken 7/16/2022 0800  Discharge to home or other facility with appropriate resources:   Identify barriers to discharge with patient and caregiver   Arrange for needed discharge resources and transportation as appropriate   Identify discharge learning needs (meds, wound care, etc)   Refer to discharge planning if patient needs post-hospital services based on physician order or complex needs related to functional status, cognitive ability or social support system  Taken 7/16/2022 0750  Discharge to home or other facility with appropriate resources:   Identify barriers to discharge with patient and caregiver   Arrange for needed discharge resources and transportation as appropriate   Arrange for interpreters to assist at discharge as needed   Refer to discharge planning if patient needs post-hospital services based on physician order or complex needs related to functional status, cognitive ability or social support system     Problem: Safety - Adult  Goal: Free from fall injury  7/16/2022 2026 by Isabelle Levin RN  Outcome: Progressing  7/16/2022 1756 by Chapito Duong RN  Outcome: Progressing  7/16/2022 1755 by Chapito Duong RN  Outcome: Progressing  Flowsheets (Taken 7/16/2022 0800)  Free From Fall Injury: Instruct family/caregiver on patient safety     Problem: Pain  Goal: Verbalizes/displays adequate comfort level or baseline comfort level  7/16/2022 2026 by Isabelle Levin RN  Outcome: Progressing  7/16/2022 1756 by Chapito Duong RN  Outcome: Not Progressing  7/16/2022 1755 by Chapito Duong RN  Outcome: Not Progressing  Flowsheets (Taken 7/16/2022 0800)  Verbalizes/displays adequate comfort level or baseline comfort level: Encourage patient to monitor pain and request assistance   Assess pain using appropriate pain scale   Administer analgesics based on type and severity of pain and evaluate response   Implement non-pharmacological measures as appropriate and evaluate response   Consider cultural and social influences on pain and pain management   Notify Licensed Independent Practitioner if interventions unsuccessful or patient reports new pain

## 2023-09-15 ENCOUNTER — HOSPITAL ENCOUNTER (OUTPATIENT)
Age: 43
Setting detail: SPECIMEN
Discharge: HOME | End: 2023-09-15

## 2023-09-15 LAB — ERYTHROCYTE [SEDIMENTATION RATE] IN BLOOD BY PHOTOMETRIC METHOD: 45 MM/HR (ref 0–20)

## 2023-09-16 LAB — CRP SERPL HS-MCNC: 10.5 MG/L (ref 0–5)

## 2023-10-25 NOTE — PROGRESS NOTES
Pt discharged home, educated on taking prescribed medications as directed and not stopping Thyroid medication, pt educated on follow up appointments, states she has an appointment with her endocrinologist on 8/23, pt verbalized understanding, ambulatory at discharge. no

## (undated) DEVICE — CURETTE VAC DIA8MM PLAS CRV OPN TIP RIG DISP VACURET D/E

## (undated) DEVICE — MEDI-VAC YANKAUER SUCTION HANDLE W/BULBOUS TIP: Brand: CARDINAL HEALTH

## (undated) DEVICE — SUTURE VCRL + SZ 3-0 L27IN ABSRB UD L26MM SH 1/2 CIR VCP416H

## (undated) DEVICE — SET COLL TBNG L6FT DIA3/8IN W/ INTEGR SWVL HNDL SLIP RNG M

## (undated) DEVICE — GOWN SURGICALXL REUSE REPROC

## (undated) DEVICE — TUBE ET DIA7.5MM ORAL NSL CUF MURPHY EYE HI LO RADPQ LN

## (undated) DEVICE — 1200CC GUARDIAN II: Brand: GUARDIAN

## (undated) DEVICE — ST CHARLES PERI-GYN PACK: Brand: MEDLINE INDUSTRIES, INC.

## (undated) DEVICE — SOLUTION IV IRRIG WATER 1000ML POUR BRL 2F7114